# Patient Record
Sex: FEMALE | Race: BLACK OR AFRICAN AMERICAN | NOT HISPANIC OR LATINO | Employment: FULL TIME | URBAN - METROPOLITAN AREA
[De-identification: names, ages, dates, MRNs, and addresses within clinical notes are randomized per-mention and may not be internally consistent; named-entity substitution may affect disease eponyms.]

---

## 2018-01-10 NOTE — PROGRESS NOTES
Assessment    1  Constipation (564 00) (K59 00)   2  Screening, lipid (V77 91) (Z13 220)   3  Encounter for preventive health examination (V70 0) (Z00 00)    Plan   Health Maintenance    · Multiple Vitamin Oral Tablet; TAKE 1 TABLET DAILY   · 1 - Sindhu Gutierrez DO  (Obstetrics/Gynecology) Physician Referral  Consult  Status:  Hold For - Scheduling  Requested for: [id number]  Care Summary provided  : Yes  Laboratory examination ordered as part of a routine general medical examination    · (1) CBC/PLT/DIFF; Status:Active; Requested for:15Mar2016;    · (1) TSH; Status:Active; Requested for:15Mar2016;    · (1) VITAMIN D 25-HYDROXY; Status:Active; Requested for:15Mar2016;   Screening for diabetes mellitus    · (1) COMPREHENSIVE METABOLIC PANEL; Status:Active; Requested for:15Mar2016;   Screening, lipid    · (1) LIPID PANEL, FASTING; Status:Active; Requested for:15Mar2016;     * MAMMO SCREENING BILATERAL W CAD; Status:Hold For - Scheduling; Requested for:15Mar2016;   Perform:[facility]; SRZ:[id number];[facility];    [facility] for screening mammogram for breast cancer; Ordered By:Alexandria Silverio;      Discussion/Summary  health maintenance visit Currently, she eats an adequate diet  cervical cancer screening is current Breast cancer screening: mammogram has been ordered  Colorectal cancer screening: colorectal cancer screening is not indicated  Osteoporosis screening: bone mineral density testing is not indicated  The immunizations are needed and patient declines immunizations  Advice and education were given regarding nutrition and reproductive health  Patient discussion: discussed with the patient  1  s/p bronchitis  Reviewed ER records  Improving, complete azithromycin  Use benzonatate prn, may take ibuprofen prn for rib pain  2  Constipation  Increase fluid intake and fiber in diet, avoid straining  3  HM  Declined Tdap and flu vaccine  Mammogram and PAPs due this year   Due for routine labs     Follow up in 2 months or prn  History of Present Illness  HM, Adult Female: The patient is being seen for a health maintenance evaluation  General Health: The patient's health since the last visit is described as good  She does not have regular dental visits  She complains of vision problems  She denies hearing loss  Immunizations status: not up to date  Lifestyle:  She consumes a diverse and healthy diet  She does not have any weight concerns  She denies alcohol use  She denies drug use  Reproductive health:  she reports normal menses  Screening: Cervical cancer screening includes a pap smear performed last year  Breast cancer screening includes a mammogram performed last year  She hasn't been previously screened for colorectal cancer  Metabolic screening includes lipid profile performed within the past five years, glucose screening performed last year, thyroid function test performed last year and no previous DEXA  Cardiovascular risk factors: no obesity  Safety elements used: seat belt  HPI: Ms Kim Cedeno was recently diagnosed with pneumonia, was evaluated at the ER last week  She is almost done with her antibiotics, takes a cough medicine also  She feels her cough is better, still has some right lower rib pain when coughing  She does not take any over the counter medications  Denies any fever or chills  She has no other complaints  She moves her bowels eery 3-4 days, does not drink enough liquids daily  Constipation (Brief): The patient is being seen for an initial evaluation of an existing diagnosis of constipation  Symptoms:  infrequent stools, but no abdominal pain and no abdominal cramping  The patient is currently experiencing symptoms  No associated symptoms are reported  Pertinent medical history:  no irritable bowel syndrome  Risk factors:  inadequate fluid intake and inadequate dietary fiber, but no sedentary lifestyle        Review of Systems    Constitutional: no fever and not feeling tired  Eyes: eyesight problems, but no itching of the eyes  ENT: no hearing loss and no nasal discharge  Cardiovascular: no chest pain, no palpitations and no lower extremity edema  Respiratory: no cough and no wheezing  Gastrointestinal: constipation, but no abdominal pain  Genitourinary: no dysuria and no incontinence  Musculoskeletal: no arthralgias and no myalgias  Integumentary: no rashes and no skin wound  Neurological: no dizziness  Psychiatric: no sleep disturbances  no feelings of weakness   Hematologic/Lymphatic: no tendency for easy bleeding  Active Problems    1  Constipation (564 00) (K59 00)   2  Encounter for screening mammogram for breast cancer (V76 12) (Z12 31)   3  Laboratory examination ordered as part of a routine general medical examination   (V72 62) (Z00 00)   4  Screening for diabetes mellitus (V77 1) (Z13 1)   5  Screening, lipid (V77 91) (Z13 220)    Surgical History    · Denied: History of Recent Surgery    Family History    · No pertinent family history    · No pertinent family history    · Family history of malignant neoplasm of breast (V16 3) (Z80 3)    · Family history of diabetes mellitus (V18 0) (Z83 3)   · Family history of malignant neoplasm of breast (V16 3) (Z80 3)    · Family history of cardiac disorder (V17 49) (Z82 49)   · Family history of diabetes mellitus (V18 0) (Z83 3)    · Family history of cardiac disorder (V17 49) (Z82 49)    · Family history of cardiac disorder (V17 49) (Z82 49)    Social History    · Drinks coffee   · Never a smoker   · No alcohol use   · Single parent (V61 8)   · 22 y/o son   · Unemployed looking for work   · Work history   · marketing at [employer]    Current Meds   1  Multi Vitamin Daily Oral Tablet; TAKE 1 TABLET DAILY; Therapy: [id number] to Recorded    Allergies    1   No Known Drug Allergies    Vitals   Recorded: [id number] 11:44AM   Temperature 98 4 F   Heart Rate 81   Respiration 16   Systolic 215   Diastolic 74   Height 5 ft 1 in   Weight 115 lb 4 00 oz   BMI Calculated 21 78   BSA Calculated 1 49   O2 Saturation 96     Physical Exam    Constitutional   General appearance: No acute distress, well appearing and well nourished  appears healthy, comfortable, clothing appropriate and rested  Head and Face   Head and face: Normal     Eyes   Conjunctiva and lids: No swelling, erythema or discharge  Pupils and irises: Equal, round, reactive to light  Ears, Nose, Mouth, and Throat   External inspection of ears and nose: Normal     Otoscopic examination: Tympanic membranes translucent with normal light reflex  Canals patent without erythema  Nasal mucosa, septum, and turbinates: Normal without edema or erythema  nose ring  Oropharynx: Normal with no erythema, edema, exudate or lesions  Neck   Neck: Supple, symmetric, trachea midline, no masses  Thyroid: Normal, no thyromegaly  Pulmonary   Respiratory effort: No increased work of breathing or signs of respiratory distress  minimal pain below R breast, 5th-6th rib  Auscultation of lungs: Clear to auscultation  Cardiovascular   Auscultation of heart: Normal rate and rhythm, normal S1 and S2, no murmurs  Examination of extremities for edema and/or varicosities: Normal     Abdomen   Abdomen: Non-tender, no masses  Bowel sounds were normal  The abdomen was soft and nontender  no masses palpated  The abdomen was normal to percussion  Liver and spleen: No hepatomegaly or splenomegaly  Examination for hernias: No hernia appreciated  Lymphatic   Palpation of lymph nodes in neck: No lymphadenopathy  no posterior cervical node enlargement and no supraclavicular node enlargement  Palpation of lymph nodes in other areas: No lymphadenopathy  no anterior cervical node enlargement and no submandibular node enlargement     Musculoskeletal   Gait and station: Normal     Muscle strength/tone: Normal     Skin   Skin and subcutaneous tissue: Normal without rashes or lesions  Palpation of skin and subcutaneous tissue: Normal turgor  Neurologic   Cranial nerves: Cranial nerves II-XII intact  Cortical function: Normal mental status      Psychiatric   Orientation to person, place, and time: Normal     Mood and affect: Normal        Signatures   Electronically signed by : Erlin Mariano MD; Mar 15 2016  3:57PM EST                       (Author)

## 2018-01-12 NOTE — RESULT NOTES
Verified Results  *US BREAST RIGHT LIMITED (DIAGNOSTIC) [id number] 82:06ZW Sophie Gutierrez     Test Name Result Flag Reference   US BREAST RIGHT LIMITED (Report)     Patient History:   Family history of colorectal cancer in paternal uncle, breast    cancer in paternal grandmother at age 48 or over, and breast    cancer in maternal grandmother under age 48  Patient has never smoked  Patient's BMI is 22 1  Reason for exam: clinical finding  Mammo Diagnostic Bilateral W CAD: May 26, 2016 - Check In #:    [de-identified]   Bilateral MLO, CC, ML, and XCCL view(s) were taken  Technologist: PITER Dickson (PITER)(M)   Prior study comparison: September 18, 2015, mammo screening    bilateral, performed at [facility]  The breast tissue is heterogeneously dense, potentially limiting    the sensitivity of mammography  Patient risk, included in this    report, assists in determining the appropriate screening regimen    (such as 3-D mammography or the inclusion of automated breast    ultrasound or MRI)  3-D mammography may also remain indicated as    screening  These images were obtained using digital technique    and with the assistance of Computer Aided Detection  There are    no dominant masses, foci of architectural distortion or    suspicious clusters of calcification to suggest malignancy  The    visualized skin appears normal      Targeted ultrasound demonstrates no evidence of hypoechoic mass    or architectural distortion to suggest malignancy  What likely    represents an incidentally noted complicated cyst is present at    the 2 o'clock position of the left breast, 6 cm from the nipple    (area of palpable abnormality)       US Breast Right Limited: May 26, 2016 - Check In #: [de-identified]   Technologist: Marc Davis RDMS     US Breast Left Limited: May 26, 2016 - Check In #: [de-identified]   Technologist: Marc Davis RDMS     ASSESSMENT: BiRad:2 - Benign (Overall)     Recommendation:   Clinical management of both breasts  Routine screening mammogram of both breasts in 1 year  Analyzed by CAD     Transcription Location: [address]   Signing Station: [id number]     Risk Value(s):   Tyrer-Cuzick 10 Year: 2 420%, Tyrer-Cuzick Lifetime: 15 920%,    Myriad Table: 2 6%, SAGE 5 Year: 0 7%, NCI Lifetime: 9 5%     MAMMO DIAGNOSTIC BILATERAL W CAD [id number] 02:72VK Keisha Hernandez Order Number: [id number]     Test Name Result Flag Reference   MAMMO DIAGNOSTIC BILATERAL W CAD (Report)     Patient History:   Family history of colorectal cancer in paternal uncle, breast    cancer in paternal grandmother at age 48 or over, and breast    cancer in maternal grandmother under age 48  Patient has never smoked  Patient's BMI is 22 1  Reason for exam: clinical finding  Mammo Diagnostic Bilateral W CAD: May 26, 2016 - Check In #:    [de-identified]   Bilateral MLO, CC, ML, and XCCL view(s) were taken  Technologist: PITER Chowdary (PITER)(M)   Prior study comparison: September 18, 2015, mammo screening    bilateral, performed at [facility]  The breast tissue is heterogeneously dense, potentially limiting    the sensitivity of mammography  Patient risk, included in this    report, assists in determining the appropriate screening regimen    (such as 3-D mammography or the inclusion of automated breast    ultrasound or MRI)  3-D mammography may also remain indicated as    screening  These images were obtained using digital technique    and with the assistance of Computer Aided Detection  There are    no dominant masses, foci of architectural distortion or    suspicious clusters of calcification to suggest malignancy  The    visualized skin appears normal      Targeted ultrasound demonstrates no evidence of hypoechoic mass    or architectural distortion to suggest malignancy   What likely    represents an incidentally noted complicated cyst is present at    the 2 o'clock position of the left breast, 6 cm from the nipple    (area of palpable abnormality)  US Breast Right Limited: May 26, 2016 - Check In #: [de-identified]   Technologist: Pat Martinez RDMS     US Breast Left Limited: May 26, 2016 - Check In #: [de-identified]   Technologist: Pat Martinez RDMS     ASSESSMENT: BiRad:2 - Benign (Overall)     Recommendation:   Clinical management of both breasts  Routine screening mammogram of both breasts in 1 year     Analyzed by CAD     Transcription Location: [facility]: [id number]     Risk Value(s):   Tyrer-Cuzick 10 Year: 2 420%, Tyrer-Cuzick Lifetime: 15 920%,    Myriad Table: 2 6%, SAGE 5 Year: 0 7%, NCI Lifetime: 9 5%

## 2018-01-12 NOTE — RESULT NOTES
Verified Results  (1) CBC/PLT/DIFF [id number] 09:52AM [facility]     Test Name Result Flag Reference   WHITE BLOOD CELL COUNT 8 4 Thousand/uL  3 8-10 8   RED BLOOD CELL COUNT 4 03 Million/uL  3 80-5 10   HEMOGLOBIN 11 6 g/dL L 11 7-15 5   HEMATOCRIT 37 1 %  35 0-45 0   MCV 92 0 fL  80 0-100 0   MCH 28 9 pg  27 0-33 0   MCHC 31 4 g/dL L 32 0-36 0   RDW 13 6 %  11 0-15 0   PLATELET COUNT 864 Thousand/uL H 140-400   MPV 7 8 fL  7 5-11 5   ABSOLUTE NEUTROPHILS 5536 cells/uL  5820-9874   ABSOLUTE LYMPHOCYTES 1890 cells/uL  850-3900   ABSOLUTE MONOCYTES 689 cells/uL  200-950   ABSOLUTE EOSINOPHILS 244 cells/uL     ABSOLUTE BASOPHILS 42 cells/uL  0-200   NEUTROPHILS 65 9 %     LYMPHOCYTES 22 5 %     MONOCYTES 8 2 %     EOSINOPHILS 2 9 %     BASOPHILS 0 5 %       (1) LIPID PANEL, FASTING [id number] 09:52AM [facility]     Test Name Result Flag Reference   CHOLESTEROL, TOTAL 159 mg/dL  125-200   HDL CHOLESTEROL 59 mg/dL  > OR = 46   TRIGLICERIDES 72 mg/dL  <150   LDL-CHOLESTEROL 86 mg/dL (calc)  <130   Desirable range <100 mg/dL for patients with CHD or  diabetes and <70 mg/dL for diabetic patients with  known heart disease  CHOL/HDLC RATIO 2 7 (calc)  < OR = 5 0   NON HDL CHOLESTEROL 100 mg/dL (calc)     Target for non-HDL cholesterol is 30 mg/dL higher than   LDL cholesterol target  (1) COMPREHENSIVE METABOLIC PANEL [id number] 15:18YC [facility]     Test Name Result Flag Reference   GLUCOSE 79 mg/dL  65-99   Fasting reference interval   UREA NITROGEN (BUN) 9 mg/dL  7-25   CREATININE 0 73 mg/dL  0 50-1 10   eGFR NON-AFR   AMERICAN 102 mL/min/1 73m2  > OR = 60   eGFR AFRICAN AMERICAN 119 mL/min/1 73m2  > OR = 60   BUN/CREATININE RATIO   7-92   NOT APPLICABLE (calc)   SODIUM 137 mmol/L  135-146   POTASSIUM 4 4 mmol/L  3 5-5 3   CHLORIDE 103 mmol/L     CARBON DIOXIDE 26 mmol/L  19-30   CALCIUM 9 6 mg/dL  8 6-10 2   PROTEIN, TOTAL 7 2 g/dL  6 1-8 1   ALBUMIN 4 1 g/dL  3 6-5 1 GLOBULIN 3 1 g/dL (calc)  1 9-3 7   ALBUMIN/GLOBULIN RATIO 1 3 (calc)  1 0-2 5   BILIRUBIN, TOTAL 0 5 mg/dL  0 2-1 2   ALKALINE PHOSPHATASE 57 U/L     AST 14 U/L  10-30   ALT 8 U/L  6-29     (Q) TSH, 3RD GENERATION [id number] 09:52AM [facility]     Test Name Result Flag Reference   TSH 1 45 mIU/L     Reference Range                         > or = 20 Years  0 40-4 50                              Pregnancy Ranges            First trimester    0 26-2 66            Second trimester   0 55-2 73            Third trimester    0 43-2 91     *(Q) VITAMIN D, 25-HYDROXY, LC/MS/MS [id number] 09:52AM [facility] Silverio   REPORT COMMENT:  FASTING:YES     Test Name Result Flag Reference   VITAMIN D, 25-OH, TOTAL 29 ng/mL L    Vitamin D Status         25-OH Vitamin D:     Deficiency:                    <20 ng/mL  Insufficiency:             20 - 29 ng/mL  Optimal:                 > or = 30 ng/mL     For 25-OH Vitamin D testing on patients on   D2-supplementation and patients for whom quantitation   of D2 and D3 fractions is required, the QuestAssureD(TM)  25-OH VIT D, (D2,D3), LC/MS/MS is recommended: order   code 22036 (patients >2yrs)  For more information on this test, go to:  http://[employer]/faq/UST441  (This link is being provided for   informational/educational purposes only )

## 2018-01-15 NOTE — RESULT NOTES
Verified Results  (Q) THINPREP PAP AND HR HPV DNA [id number] 05:09IX Bisi Gutierrez     Test Name Result Flag Reference   CLINICAL INFORMATION:      none given   LMP:      NONE GIVEN   PREV  PAP:      NONE GIVEN   PREV  BX:      NONE GIVEN   SOURCE:      [facility]   STATEMENT OF ADEQUACY:      Satisfactory for evaluation  Endocervical/transformation zone component  present  Age and/or menstrual status not provided   INTERPRETATION/RESULT:      Negative for intraepithelial lesion or malignancy  CYTOTECHNOLOGIST:      MLC, CT(ASCP)  CT screening location:[place] [address]   HPV mRNA E6/E7 Not Detected  Not Detected   This test was performed using the APTIMA HPV Assay ([employer] )  This assay detects E6/E7 viral messenger RNA (mRNA) from 14  high-risk HPV types (16,18,31,33,35,39,45,51,52,56,58,59,66,68)

## 2019-01-30 ENCOUNTER — ANNUAL EXAM (OUTPATIENT)
Dept: OBGYN CLINIC | Facility: CLINIC | Age: 44
End: 2019-01-30
Payer: COMMERCIAL

## 2019-01-30 VITALS
DIASTOLIC BLOOD PRESSURE: 80 MMHG | SYSTOLIC BLOOD PRESSURE: 116 MMHG | BODY MASS INDEX: 21.71 KG/M2 | WEIGHT: 118 LBS | HEIGHT: 62 IN

## 2019-01-30 DIAGNOSIS — Z01.419 WELL FEMALE EXAM WITH ROUTINE GYNECOLOGICAL EXAM: Primary | ICD-10-CM

## 2019-01-30 DIAGNOSIS — Z11.51 SCREENING FOR HPV (HUMAN PAPILLOMAVIRUS): ICD-10-CM

## 2019-01-30 DIAGNOSIS — Z12.31 ENCOUNTER FOR SCREENING MAMMOGRAM FOR MALIGNANT NEOPLASM OF BREAST: ICD-10-CM

## 2019-01-30 DIAGNOSIS — Z12.4 ENCOUNTER FOR SCREENING FOR MALIGNANT NEOPLASM OF CERVIX: ICD-10-CM

## 2019-01-30 PROCEDURE — 99396 PREV VISIT EST AGE 40-64: CPT | Performed by: OBSTETRICS & GYNECOLOGY

## 2019-01-30 NOTE — PROGRESS NOTES
ASSESSMENT & PLAN: Brina Rice is a 39 y o  [id number] with normal gynecologic exam     1   Routine well woman exam done today  2   Pap and HPV:Pap with HPV was done today  Current ASCCP Guidelines reviewed  3   Mammogram ordered  Recommend yearly mammography  4   The patient declined STD testing  No testing performed  Safe sex practices have been discussed  5  The patient is sexually active  She declined contraception and options have been discussed  6  The following were reviewed in today's visit: breast self exam, mammography screening ordered, family planning choices, exercise and healthy diet  7  Patient to return to office in 12 months for annual exam      All questions have been answered to her satisfaction  CC:  Annual Gynecologic Examination    HPI: Brina Rice is a 39 y o  [id number] who presents for annual gynecologic examination  She has the following concerns: none  Health Maintenance:    She exercises 2 days per week  She wears her seatbelt routinely  She does perform regular monthly self breast exams  She feels safe at home  Patients does follow a balanced diet  Last mammo:     Past Medical History:   Diagnosis Date    Female infertility     Recurrent pregnancy loss, antepartum condition or complication     x 4       Past Surgical History:   Procedure Laterality Date     SECTION         Past OB/Gyn History:  Period Cycle (Days): 28  Period Duration (Days): 5 days   Period Pattern: Regular  Menstrual Flow: Moderate  Menstrual Control: Thin pad, Panty liner  Dysmenorrhea: (!) Mild  Dysmenorrhea Symptoms: CrampingPatient's last menstrual period was 2018 (exact date)  Menstrual History:  OB History      Para Term  AB Living    6 1 1   5 1    SAB TAB Ectopic Multiple Live Births    4       1           Patient's last menstrual period was 2018 (exact date)    Period Cycle (Days): 28  Period Duration (Days): 5 days   Period Pattern: Regular  Menstrual Flow: Moderate  Menstrual Control: Thin pad, Panty liner  Dysmenorrhea: (!) Mild  Dysmenorrhea Symptoms: Cramping    History of sexually transmitted infection No  Patient is currently sexually active: heterosexual  Birth control: no method  Last Pap  2016:  no abnormalities; HPV negative    Family History:  Family History   Problem Relation Age of Onset    No Known Problems Mother     No Known Problems Father     No Known Problems Sister     Diabetes Brother     Hypertension Brother     No Known Problems Son     Breast cancer Maternal Grandmother     No Known Problems Maternal Grandfather     Breast cancer Paternal Grandmother     Diabetes Paternal Grandfather     Heart disease Paternal Grandfather     Hypertension Paternal Grandfather     No Known Problems Sister     Heart attack Maternal Aunt        Social History:  Social History     Social History    Marital status: Single     Spouse name: N/A    Number of children: N/A    Years of education: N/A     Occupational History    Not on file  Social History Main Topics    Smoking status: Never Smoker    Smokeless tobacco: Never Used    Alcohol use No    Drug use: No    Sexual activity: Yes     Partners: Male     Birth control/ protection: None     Other Topics Concern    Not on file     Social History Narrative    No narrative on file     Presently lives with   Patient is   Patient is currently employed  Allergies:  No Known Allergies    Medications:  No current outpatient prescriptions on file  Review of Systems:  Review of Systems   Constitutional: Negative for unexpected weight change  Respiratory: Negative for shortness of breath  Cardiovascular: Negative for chest pain  Gastrointestinal: Negative for abdominal distention, abdominal pain, blood in stool, constipation, nausea and vomiting     Genitourinary: Negative for difficulty urinating, dysuria, frequency, vaginal bleeding, vaginal discharge and vaginal pain  Neurological: Negative for headaches  Physical Exam:  /80   Ht 5' 1 75" (1 568 m)   Wt 53 5 kg (118 lb)   LMP 12/17/2018 (Exact Date)   Breastfeeding? No   BMI 21 76 kg/m²      Physical Exam   Constitutional: She is oriented to person, place, and time  Vital signs are normal  She appears well-developed and well-nourished  Genitourinary: Vagina normal and uterus normal  Pelvic exam was performed with patient supine  There is no rash, tenderness or lesion on the right labia  There is no rash, tenderness or lesion on the left labia  Vagina exhibits rugosity  No tenderness or bleeding in the vagina  No vaginal discharge found  Right adnexum does not display mass, does not display tenderness and does not display fullness  Left adnexum does not display mass, does not display tenderness and does not display fullness  Cervix does not exhibit motion tenderness, lesion, discharge or polyp  Uterus is mobile and anteverted  Uterus is not enlarged  HENT:   Head: Normocephalic  Neck: No thyromegaly present  Cardiovascular: Normal rate, regular rhythm and normal heart sounds  Pulmonary/Chest: Effort normal and breath sounds normal  No respiratory distress  She has no wheezes  Right breast exhibits no inverted nipple, no mass, no nipple discharge, no skin change and no tenderness  Left breast exhibits no inverted nipple, no mass, no nipple discharge, no skin change and no tenderness  Abdominal: Soft  She exhibits no distension  Neurological: She is alert and oriented to person, place, and time  Psychiatric: She has a normal mood and affect  Her behavior is normal    Vitals reviewed

## 2019-02-03 LAB
CYTOLOGIST CVX/VAG CYTO: NORMAL
DX ICD CODE: NORMAL
HPV I/H RISK 1 DNA CVX QL PROBE+SIG AMP: NEGATIVE
OTHER STN SPEC: NORMAL
PATH REPORT.FINAL DX SPEC: NORMAL
SL AMB NOTE:: NORMAL
SL AMB SPECIMEN ADEQUACY: NORMAL
SL AMB TEST METHODOLOGY: NORMAL

## 2019-02-06 ENCOUNTER — TELEPHONE (OUTPATIENT)
Dept: RADIOLOGY | Facility: HOSPITAL | Age: 44
End: 2019-02-06

## 2019-02-06 ENCOUNTER — HOSPITAL ENCOUNTER (OUTPATIENT)
Dept: RADIOLOGY | Facility: HOSPITAL | Age: 44
Discharge: HOME | End: 2019-02-06
Attending: OBSTETRICS & GYNECOLOGY
Payer: COMMERCIAL

## 2019-02-06 VITALS — WEIGHT: 118 LBS | HEIGHT: 61 IN | BODY MASS INDEX: 22.28 KG/M2

## 2019-02-06 DIAGNOSIS — Z12.31 ENCOUNTER FOR SCREENING MAMMOGRAM FOR MALIGNANT NEOPLASM OF BREAST: ICD-10-CM

## 2019-02-06 PROCEDURE — 77063 BREAST TOMOSYNTHESIS BI: CPT

## 2019-02-06 PROCEDURE — 77067 SCR MAMMO BI INCL CAD: CPT

## 2019-02-20 ENCOUNTER — TELEPHONE (OUTPATIENT)
Dept: RADIOLOGY | Facility: HOSPITAL | Age: 44
End: 2019-02-20

## 2019-02-20 ENCOUNTER — HOSPITAL ENCOUNTER (OUTPATIENT)
Dept: RADIOLOGY | Facility: HOSPITAL | Age: 44
Discharge: HOME | End: 2019-02-20
Attending: OBSTETRICS & GYNECOLOGY
Payer: COMMERCIAL

## 2019-02-20 DIAGNOSIS — R92.8 ABNORMAL MAMMOGRAM: ICD-10-CM

## 2019-02-20 PROCEDURE — 77065 DX MAMMO INCL CAD UNI: CPT

## 2019-02-25 ENCOUNTER — TELEPHONE (OUTPATIENT)
Dept: OBGYN CLINIC | Facility: CLINIC | Age: 44
End: 2019-02-25

## 2019-02-25 ENCOUNTER — TRANSCRIBE ORDERS (OUTPATIENT)
Dept: ADMINISTRATIVE | Facility: HOSPITAL | Age: 44
End: 2019-02-25

## 2019-02-25 ENCOUNTER — HOSPITAL ENCOUNTER (OUTPATIENT)
Dept: RADIOLOGY | Facility: HOSPITAL | Age: 44
Discharge: HOME | End: 2019-02-25
Attending: OBSTETRICS & GYNECOLOGY

## 2019-02-25 ENCOUNTER — HOSPITAL ENCOUNTER (OUTPATIENT)
Dept: RADIOLOGY | Facility: HOSPITAL | Age: 44
Discharge: HOME | End: 2019-02-25
Attending: OBSTETRICS & GYNECOLOGY | Admitting: RADIOLOGY
Payer: COMMERCIAL

## 2019-02-25 VITALS
RESPIRATION RATE: 18 BRPM | OXYGEN SATURATION: 18 % | SYSTOLIC BLOOD PRESSURE: 114 MMHG | HEART RATE: 77 BPM | DIASTOLIC BLOOD PRESSURE: 68 MMHG

## 2019-02-25 DIAGNOSIS — R92.8 ABNORMAL MAMMOGRAM: Primary | ICD-10-CM

## 2019-02-25 DIAGNOSIS — R92.8 ABNORMAL MAMMOGRAM: ICD-10-CM

## 2019-02-25 PROCEDURE — 88305 TISSUE EXAM BY PATHOLOGIST: CPT | Performed by: PATHOLOGY

## 2019-02-25 PROCEDURE — 19081 BX BREAST 1ST LESION STRTCTC: CPT

## 2019-02-25 PROCEDURE — 88361 TUMOR IMMUNOHISTOCHEM/COMPUT: CPT | Performed by: PATHOLOGY

## 2019-02-25 RX ORDER — LIDOCAINE HYDROCHLORIDE 10 MG/ML
INJECTION, SOLUTION INFILTRATION; PERINEURAL CODE/TRAUMA/SEDATION MEDICATION
Status: COMPLETED | OUTPATIENT
Start: 2019-02-25 | End: 2019-02-25

## 2019-02-25 RX ORDER — LIDOCAINE HYDROCHLORIDE AND EPINEPHRINE 10; 10 MG/ML; UG/ML
INJECTION, SOLUTION INFILTRATION; PERINEURAL CODE/TRAUMA/SEDATION MEDICATION
Status: COMPLETED | OUTPATIENT
Start: 2019-02-25 | End: 2019-02-25

## 2019-02-25 RX ADMIN — LIDOCAINE HYDROCHLORIDE 5 ML: 10 INJECTION, SOLUTION INFILTRATION; PERINEURAL at 13:43

## 2019-02-25 RX ADMIN — LIDOCAINE HYDROCHLORIDE AND EPINEPHRINE 4 ML: 10; 10 INJECTION, SOLUTION INFILTRATION; PERINEURAL at 13:44

## 2019-02-25 NOTE — SEDATION DOCUMENTATION
Stereotactic right breast biopsy and clip marker placement done  Pt tolerated well  Pressure to sit,steristrips and DSD applied  No bleeding noted,area soft to touch  Discharge instructions reviewed with pt  And copy to her  Ice pack provided

## 2019-02-25 NOTE — TELEPHONE ENCOUNTER
Bridget Moreno 47  Calling to have provider sign order for stereotactic biopsy of right breast     Routing to provider to sign

## 2019-02-26 ENCOUNTER — TELEPHONE (OUTPATIENT)
Dept: RADIOLOGY | Facility: HOSPITAL | Age: 44
End: 2019-02-26

## 2019-02-26 NOTE — TELEPHONE ENCOUNTER
Post procedure call completed    Bleeding: _____yes __x___no    Pain: _____yes ___x___no    Redness/Swelling: ______yes __x____no    Band aid removed: _x____yes _____no    Steri-Strips intact: __x____yes _____no  Had pain last pm,relief with Tylenol  No pain today

## 2019-02-27 ENCOUNTER — TELEPHONE (OUTPATIENT)
Dept: RADIOLOGY | Facility: HOSPITAL | Age: 44
End: 2019-02-27

## 2019-02-27 NOTE — TELEPHONE ENCOUNTER
Pt  Called me after Dr Alysia Alicea called her with pathology results  She agreed to see Dr Vogel  I made an appt  For her on 3/7/19 at 1300  All questions answered  Directions and address given to her  Pt  Has my no  for any further assistance

## 2019-03-04 ENCOUNTER — CONSULT (OUTPATIENT)
Dept: SURGERY | Facility: CLINIC | Age: 44
End: 2019-03-04
Payer: COMMERCIAL

## 2019-03-04 VITALS
SYSTOLIC BLOOD PRESSURE: 118 MMHG | WEIGHT: 121.6 LBS | HEART RATE: 77 BPM | BODY MASS INDEX: 22.96 KG/M2 | DIASTOLIC BLOOD PRESSURE: 80 MMHG | HEIGHT: 61 IN

## 2019-03-04 DIAGNOSIS — D05.11 DUCTAL CARCINOMA IN SITU (DCIS) OF RIGHT BREAST: Primary | ICD-10-CM

## 2019-03-04 DIAGNOSIS — Z01.818 PREOPERATIVE EXAMINATION: ICD-10-CM

## 2019-03-04 PROCEDURE — 99244 OFF/OP CNSLTJ NEW/EST MOD 40: CPT | Performed by: SURGERY

## 2019-03-04 RX ORDER — CEFAZOLIN SODIUM 1 G/50ML
1000 SOLUTION INTRAVENOUS ONCE
Status: CANCELLED | OUTPATIENT
Start: 2019-03-04 | End: 2019-03-04

## 2019-03-04 NOTE — H&P
[facility] - [facility] Shannanoctavia Puckett    NAME: Barbara Calvillo  AGE: 39 y o  SEX: female  : 1975     DATE: 3/4/2019    Assessment and Plan     Problem List Items Addressed This Visit     None      Visit Diagnoses     Ductal carcinoma in situ (DCIS) of right breast    -  Primary        Discussed surgical treatment of needle placement lumpectomy, patient is agreeable to proceed  Risks and benefits discussed, consents signed in office today, she will schedule at her earliest convenience  Discussed follow up treatment to include radiation and evaluation by oncology for any additional therapy  Chief Complaint     Chief Complaint   Patient presents with    Advice Only     Breast cancer consult     History of Present Illness     Presents for evaluation of DCIS of right breast, found initially on routine screening annual mammogram as suspicious calcifications, biopsy result confirmed DCIS with calcifications covering an area of 1 2 x 0 5 cm  She did not notice any lump, skin changes, or pain in her breast  She had prior mammograms which were normal  Overall she is in good health with no medical problems, not on any chronic medications, no acute complaints  Does report family history of breast cancer in both grandmothers and notes her maternal grandmother  from breast cancer at age 39  Review of Systems     Review of Systems   Constitutional: Negative for appetite change, chills and fever  HENT: Negative for congestion, rhinorrhea and sore throat  Eyes: Negative for visual disturbance  Respiratory: Negative for cough, shortness of breath and wheezing  Cardiovascular: Negative for chest pain and leg swelling  Gastrointestinal: Negative for abdominal pain, constipation, diarrhea, nausea and vomiting  Skin: Negative for rash  Neurological: Negative for dizziness and headaches       Past Medical History     Past Medical History:   Diagnosis Date    Female infertility     Recurrent pregnancy loss, antepartum condition or complication     x 4     Past Surgical History     Past Surgical History:   Procedure Laterality Date     SECTION      MAMMO STEREOTACTIC BREAST BIOPSY RIGHT (ALL INC) Right 2019     Social History     Social History     Socioeconomic History    Marital status: Single     Spouse name: Not on file    Number of children: Not on file    Years of education: Not on file    Highest education level: Not on file   Occupational History    Not on file   Social Needs    Financial resource strain: Not on file    Food insecurity:     Worry: Not on file     Inability: Not on file    Transportation needs:     Medical: Not on file     Non-medical: Not on file   Tobacco Use    Smoking status: Never Smoker    Smokeless tobacco: Never Used   Substance and Sexual Activity    Alcohol use: No    Drug use: No    Sexual activity: Yes     Partners: Male     Birth control/protection: None   Lifestyle    Physical activity:     Days per week: Not on file     Minutes per session: Not on file    Stress: Not on file   Relationships    Social connections:     Talks on phone: Not on file     Gets together: Not on file     Attends Anabaptism service: Not on file     Active member of club or organization: Not on file     Attends meetings of clubs or organizations: Not on file     Relationship status: Not on file    Intimate partner violence:     Fear of current or ex partner: Not on file     Emotionally abused: Not on file     Physically abused: Not on file     Forced sexual activity: Not on file   Other Topics Concern    Not on file   Social History Narrative    Not on file     Family History     Family History   Problem Relation Age of Onset    No Known Problems Mother     No Known Problems Father     No Known Problems Sister     Diabetes Brother     Hypertension Brother     No Known Problems Son     Breast cancer Maternal Grandmother     No Known Problems Maternal Grandfather     Breast cancer Paternal Grandmother     Diabetes Paternal Grandfather     Heart disease Paternal Grandfather     Hypertension Paternal Grandfather     No Known Problems Sister     Heart attack Maternal Aunt      Current Medications     No current outpatient medications on file  Allergies     No Known Allergies    Objective     /80 (BP Location: Right arm, Patient Position: Sitting, Cuff Size: Adult)   Pulse 77   Ht 5' 1" (1 549 m)   Wt 55 2 kg (121 lb 9 6 oz)   LMP 02/15/2019   BMI 22 98 kg/m²       Physical Exam   Constitutional: She is oriented to person, place, and time  She appears well-developed and well-nourished  HENT:   Head: Normocephalic and atraumatic  Mouth/Throat: Oropharynx is clear and moist    Eyes: Conjunctivae and EOM are normal    Cardiovascular: Normal rate, regular rhythm and normal heart sounds  No murmur heard  Pulmonary/Chest: Effort normal and breath sounds normal  No respiratory distress  She has no wheezes  Breasts with no discrete masses or skin changes appreciated  Small bruise to right lateral breast at biopsy site  Abdominal: Soft  Bowel sounds are normal  She exhibits no distension  There is no tenderness  Musculoskeletal: Normal range of motion  She exhibits no edema or deformity  Neurological: She is alert and oriented to person, place, and time  Skin: Skin is warm and dry  Psychiatric: She has a normal mood and affect  Vitals reviewed  Aniyah Garner DO  [facility] Nancy Belle signed by Vic Amos MD at 3/4/2019  3:55 PM:  Saw the patient with the resident  I performed a thorough history, 14 point review of systems, physical exam   I agree with the assessment plan as outlined below  Patient has DCIS of the right breast on core biopsy  Patient needs needle placement lumpectomy    I described this procedure at length as well as the risks and complications  Patient desires to proceed  I also discussed radiation therapy to the breast to complete breast conservation therapy Plan  She understands and also desires to proceed

## 2019-03-04 NOTE — PROGRESS NOTES
[facility] - [facility] Ruth Ann Causey    NAME: Keila Scott  AGE: 39 y o  SEX: female  : 1975     DATE: 3/4/2019    Assessment and Plan     Problem List Items Addressed This Visit     None      Visit Diagnoses     Ductal carcinoma in situ (DCIS) of right breast    -  Primary        Discussed surgical treatment of needle placement lumpectomy, patient is agreeable to proceed  Risks and benefits discussed, consents signed in office today, she will schedule at her earliest convenience  Discussed follow up treatment to include radiation and evaluation by oncology for any additional therapy  Chief Complaint     Chief Complaint   Patient presents with    Advice Only     Breast cancer consult     History of Present Illness     Presents for evaluation of DCIS of right breast, found initially on routine screening annual mammogram as suspicious calcifications, biopsy result confirmed DCIS with calcifications covering an area of 1 2 x 0 5 cm  She did not notice any lump, skin changes, or pain in her breast  She had prior mammograms which were normal  Overall she is in good health with no medical problems, not on any chronic medications, no acute complaints  Does report family history of breast cancer in both grandmothers and notes her maternal grandmother  from breast cancer at age 39  Review of Systems     Review of Systems   Constitutional: Negative for appetite change, chills and fever  HENT: Negative for congestion, rhinorrhea and sore throat  Eyes: Negative for visual disturbance  Respiratory: Negative for cough, shortness of breath and wheezing  Cardiovascular: Negative for chest pain and leg swelling  Gastrointestinal: Negative for abdominal pain, constipation, diarrhea, nausea and vomiting  Skin: Negative for rash  Neurological: Negative for dizziness and headaches       Past Medical History     Past Medical History:   Diagnosis Date    Female infertility     Recurrent pregnancy loss, antepartum condition or complication     x 4     Past Surgical History     Past Surgical History:   Procedure Laterality Date     SECTION      MAMMO STEREOTACTIC BREAST BIOPSY RIGHT (ALL INC) Right 2019     Social History     Social History     Socioeconomic History    Marital status: Single     Spouse name: Not on file    Number of children: Not on file    Years of education: Not on file    Highest education level: Not on file   Occupational History    Not on file   Social Needs    Financial resource strain: Not on file    Food insecurity:     Worry: Not on file     Inability: Not on file    Transportation needs:     Medical: Not on file     Non-medical: Not on file   Tobacco Use    Smoking status: Never Smoker    Smokeless tobacco: Never Used   Substance and Sexual Activity    Alcohol use: No    Drug use: No    Sexual activity: Yes     Partners: Male     Birth control/protection: None   Lifestyle    Physical activity:     Days per week: Not on file     Minutes per session: Not on file    Stress: Not on file   Relationships    Social connections:     Talks on phone: Not on file     Gets together: Not on file     Attends Church service: Not on file     Active member of club or organization: Not on file     Attends meetings of clubs or organizations: Not on file     Relationship status: Not on file    Intimate partner violence:     Fear of current or ex partner: Not on file     Emotionally abused: Not on file     Physically abused: Not on file     Forced sexual activity: Not on file   Other Topics Concern    Not on file   Social History Narrative    Not on file     Family History     Family History   Problem Relation Age of Onset    No Known Problems Mother     No Known Problems Father     No Known Problems Sister     Diabetes Brother     Hypertension Brother     No Known Problems Son     Breast cancer Maternal Grandmother     No Known Problems Maternal Grandfather     Breast cancer Paternal Grandmother     Diabetes Paternal Grandfather     Heart disease Paternal Grandfather     Hypertension Paternal Grandfather     No Known Problems Sister     Heart attack Maternal Aunt      Current Medications     No current outpatient medications on file  Allergies     No Known Allergies    Objective     /80 (BP Location: Right arm, Patient Position: Sitting, Cuff Size: Adult)   Pulse 77   Ht 5' 1" (1 549 m)   Wt 55 2 kg (121 lb 9 6 oz)   LMP 02/15/2019   BMI 22 98 kg/m²      Physical Exam   Constitutional: She is oriented to person, place, and time  She appears well-developed and well-nourished  HENT:   Head: Normocephalic and atraumatic  Mouth/Throat: Oropharynx is clear and moist    Eyes: Conjunctivae and EOM are normal    Cardiovascular: Normal rate, regular rhythm and normal heart sounds  No murmur heard  Pulmonary/Chest: Effort normal and breath sounds normal  No respiratory distress  She has no wheezes  Breasts with no discrete masses or skin changes appreciated  Small bruise to right lateral breast at biopsy site  Abdominal: Soft  Bowel sounds are normal  She exhibits no distension  There is no tenderness  Musculoskeletal: Normal range of motion  She exhibits no edema or deformity  Neurological: She is alert and oriented to person, place, and time  Skin: Skin is warm and dry  Psychiatric: She has a normal mood and affect  Vitals reviewed            DO VANDA Garcia [facility] Fitz Carlin

## 2019-03-05 ENCOUNTER — TELEPHONE (OUTPATIENT)
Dept: OBGYN CLINIC | Facility: CLINIC | Age: 44
End: 2019-03-05

## 2019-03-05 ENCOUNTER — TELEPHONE (OUTPATIENT)
Dept: SURGERY | Facility: CLINIC | Age: 44
End: 2019-03-05

## 2019-03-05 PROBLEM — D05.11 DUCTAL CARCINOMA IN SITU (DCIS) OF RIGHT BREAST: Status: ACTIVE | Noted: 2019-03-05

## 2019-03-05 NOTE — TELEPHONE ENCOUNTER
Radiation is Monday through Friday for 4-6 weeks  There is no pill to replace radiation therapy  3-4 days off work should be sufficient    Thanks

## 2019-03-05 NOTE — TELEPHONE ENCOUNTER
Patient states so far she is doing okay  She met with surgeon yesterday (Dr Ely Smith) who reviewed the procedure with her  She will need to get radiation  She called back today to ask questions about radiation treatment  States she  has support at home  She would like to get a second opinion - asking provider for recommendations  Routing to provider for advise

## 2019-03-05 NOTE — TELEPHONE ENCOUNTER
----- Message from Indu Parker DO sent at 5/6/2143  2:05 PM EST -----  Dx with breast cancer  Has appt today with surgeon  Please call tomorrow to check in on patient

## 2019-03-05 NOTE — TELEPHONE ENCOUNTER
Patient called, she has a few questions regarding surgery and treatment plan    How many days a week is radiation and for how many weeks? ?    Is there a pill she can take for treatment instead of radiation? And, how long should she take off work after surgery? She is scheduled for surgery on 03/27/2019  She is a ?  She needs to notified HR dept ASAP

## 2019-03-13 ENCOUNTER — CONSULT (OUTPATIENT)
Dept: SURGICAL ONCOLOGY | Facility: CLINIC | Age: 44
End: 2019-03-13
Payer: COMMERCIAL

## 2019-03-13 VITALS
BODY MASS INDEX: 22.47 KG/M2 | HEIGHT: 61 IN | HEART RATE: 68 BPM | TEMPERATURE: 98 F | WEIGHT: 119 LBS | RESPIRATION RATE: 16 BRPM | DIASTOLIC BLOOD PRESSURE: 70 MMHG | SYSTOLIC BLOOD PRESSURE: 122 MMHG

## 2019-03-13 DIAGNOSIS — Z80.3 FAMILY HISTORY OF BREAST CANCER: ICD-10-CM

## 2019-03-13 DIAGNOSIS — D05.11 DUCTAL CARCINOMA IN SITU (DCIS) OF RIGHT BREAST: Primary | ICD-10-CM

## 2019-03-13 PROCEDURE — 99245 OFF/OP CONSLTJ NEW/EST HI 55: CPT | Performed by: SURGERY

## 2019-03-13 NOTE — LETTER
March 13, 9738     Dieudonne Bart, [address]  [facility] [address]    Patient: Maldonado Valdez   YOB: 1975   Date of Visit: 3/13/2019       Dear Dr Michael Barreto:    Thank you for referring Maldonado Valdez to me for evaluation  Below are my notes for this consultation  If you have questions, please do not hesitate to call me  I look forward to following your patient along with you  Sincerely,        Yi Malagon MD        CC: No Recipients  Yi Malagon MD  3/13/2019  3:08 PM  Sign at close encounter               Surgical Oncology Consult Note       [address]  1975  [id number]  [address] [facility]  [facility] [place] [phone]      Chief Complaint:     Chief Complaint   Patient presents with    Consult     Pt is here for initial consultation        Assessment and Plan:   Assessment/Plan   Patient presents for 2nd opinion with a new diagnosis of right breast ductal carcinoma in situ of the right breast   See HPI  I had a long conversation with her and her  regarding DCIS and its treatment  Given that she is under the age of 47 with a new diagnosis of breast cancer I have recommended genetic testing  She has second-degree relatives with breast cancer  She asked about different types of radiation therapy and the duration  We reviewed partial breast radiation therapy, hypo fractionated and standard radiation therapy  We also talked about possibly no radiation therapy as well as test that can provide more personal eyes risk regarding recurrence rates  The patient prefers genetic testing  We will coordinate this for her today  All questions were answered the patient's satisfaction      Oncology History:        Ductal carcinoma in situ (DCIS) of right breast 2019 Biopsy     Right breast stereotatic biopsy  11 o'clock, anterior 3rd of the breast  Ductal carcinoma in situ  Grade 2  ER 90  LA 20    Concordant            History of Present Illness: This is a 62-year-old woman who went for a screening mammogram which demonstrated an area of approximately 1 2 cm in size of microcalcifications  There are no abnormalities on the contralateral side  The patient subsequently had a diagnostic mammogram and ultimately a biopsy which demonstrated ductal carcinoma in situ at the 11 o'clock position in the right breast   The DCIS was grade 2, ER 90% LA 20%  Patient presents now for a 2nd opinion as outlined above  Review of Systems:   Review of Systems   Constitutional: Negative for activity change, appetite change and fatigue  HENT: Negative  Eyes: Negative  Respiratory: Negative for cough, shortness of breath and wheezing  Cardiovascular: Negative for chest pain and leg swelling  Gastrointestinal: Negative  Endocrine: Negative  Genitourinary: Negative  Musculoskeletal:        No new changes or complaints of bone pain   Skin: Negative  Allergic/Immunologic: Negative  Neurological: Negative  Hematological: Negative  Psychiatric/Behavioral: Negative          Past Medical History:      Patient Active Problem List   Diagnosis    Breast lump on right side at 7 o'clock position    Ductal carcinoma in situ (DCIS) of right breast        Past Medical History:   Diagnosis Date    Female infertility     Recurrent pregnancy loss, antepartum condition or complication     x 4        Past Surgical History:   Procedure Laterality Date     SECTION      MAMMO STEREOTACTIC BREAST BIOPSY RIGHT (ALL INC) Right 2019        Family History   Problem Relation Age of Onset    No Known Problems Mother     No Known Problems Father     No Known Problems Sister     Diabetes Brother     Hypertension Brother     No Known Problems Son     Breast cancer Maternal Grandmother     No Known Problems Maternal Grandfather     Breast cancer Paternal Grandmother     Diabetes Paternal Grandfather     Heart disease Paternal Grandfather     Hypertension Paternal Grandfather     No Known Problems Sister     Heart attack Maternal Aunt         Social History     Socioeconomic History    Marital status: Single     Spouse name: Not on file    Number of children: Not on file    Years of education: Not on file    Highest education level: Not on file   Occupational History    Not on file   Social Needs    Financial resource strain: Not on file    Food insecurity:     Worry: Not on file     Inability: Not on file    Transportation needs:     Medical: Not on file     Non-medical: Not on file   Tobacco Use    Smoking status: Never Smoker    Smokeless tobacco: Never Used   Substance and Sexual Activity    Alcohol use: No    Drug use: No    Sexual activity: Yes     Partners: Male     Birth control/protection: None   Lifestyle    Physical activity:     Days per week: Not on file     Minutes per session: Not on file    Stress: Not on file   Relationships    Social connections:     Talks on phone: Not on file     Gets together: Not on file     Attends Jainism service: Not on file     Active member of club or organization: Not on file     Attends meetings of clubs or organizations: Not on file     Relationship status: Not on file    Intimate partner violence:     Fear of current or ex partner: Not on file     Emotionally abused: Not on file     Physically abused: Not on file     Forced sexual activity: Not on file   Other Topics Concern    Not on file   Social History Narrative    Not on file      No current outpatient medications on file       Allergies   Allergen Reactions    Shellfish-Derived Products Swelling     shrimp       Physical Exam:     Vitals:    03/13/19 1359   BP: 122/70   Pulse: 68   Resp: 16   Temp: 98 °F (36 7 °C)     Physical Exam Constitutional: She is oriented to person, place, and time  She appears well-developed and well-nourished  HENT:   Head: Normocephalic and atraumatic  Mouth/Throat: Oropharynx is clear and moist    Eyes: Pupils are equal, round, and reactive to light  EOM are normal    Neck: Normal range of motion  Neck supple  No JVD present  No tracheal deviation present  No thyromegaly present  Cardiovascular: Normal rate, regular rhythm, normal heart sounds and intact distal pulses  Exam reveals no gallop and no friction rub  No murmur heard  Pulmonary/Chest: Effort normal and breath sounds normal  No respiratory distress  She has no wheezes  She has no rales  Examination of the left breast in both the sitting and supine position demonstrate no skin changes nipple discharge dominant masses or axillary adenopathy  Examination of the right breast demonstrates a small amount of ecchymosis well as tenderness/mass consistent with a small hematoma is outlined on the diagram   She has well-healed lateral biopsy incision  There is no evidence of any axillary adenopathy or other dominant masses or worrisome skin findings  Abdominal: Soft  She exhibits no distension and no mass  There is no hepatomegaly  There is no tenderness  There is no rebound and no guarding  Musculoskeletal: Normal range of motion  She exhibits no edema or tenderness  Lymphadenopathy:     She has no cervical adenopathy  Neurological: She is alert and oriented to person, place, and time  No cranial nerve deficit  Skin: Skin is warm and dry  No rash noted  No erythema  Psychiatric: She has a normal mood and affect  Her behavior is normal    Vitals reviewed  Results:    I reviewed her imaging as well as her pathology  The radiologist was issue to concordance report I concur with this          Discussion/Summary:   The patient I review ductal carcinoma in situ I explained that there is a small approximately 5% chance that she has invasive cancer that would be identified on her final pathology in which case the sentinel lymph node would be warranted  We talked about anti hormonal therapy as a possibility I have recommended medical oncology consult postoperatively  The patient wishes to have a baby in the next year and she would need to be off anti hormonal therapy for this  We talked about radiation therapy as well as several different ways to receive it  The patient prefers to be genetically tested will try to coordinate this for her today  She will contact us if we can provide her any additional information  Advance Care Planning/Advance Directives:  I discussed the disease status, treatment plans and follow-up with the patient

## 2019-03-13 NOTE — PROGRESS NOTES
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   See HPI  I had a long conversation with her and her  regarding DCIS and its treatment  Given that she is under the age of 47 with a new diagnosis of breast cancer I have recommended genetic testing  She has second-degree relatives with breast cancer  She asked about different types of radiation therapy and the duration  We reviewed partial breast radiation therapy, hypo fractionated and standard radiation therapy  We also talked about possibly no radiation therapy as well as test that can provide more personal eyes risk regarding recurrence rates  The patient prefers genetic testing  We will coordinate this for her today  All questions were answered the patient's satisfaction  Oncology History:        Ductal carcinoma in situ (DCIS) of right breast    2/25/2019 Biopsy     Right breast stereotatic biopsy  11 o'clock, anterior 3rd of the breast  Ductal carcinoma in situ  Grade 2  ER 90  IL 20    Concordant            History of Present Illness: This is a 40-year-old woman who went for a screening mammogram which demonstrated an area of approximately 1 2 cm in size of microcalcifications  There are no abnormalities on the contralateral side    The patient subsequently had a diagnostic mammogram and ultimately a biopsy which demonstrated ductal carcinoma in situ at the 11 o'clock position in the right breast   The DCIS was grade 2, ER 90% RI 20%  Patient presents now for a 2nd opinion as outlined above  Review of Systems:   Review of Systems   Constitutional: Negative for activity change, appetite change and fatigue  HENT: Negative  Eyes: Negative  Respiratory: Negative for cough, shortness of breath and wheezing  Cardiovascular: Negative for chest pain and leg swelling  Gastrointestinal: Negative  Endocrine: Negative  Genitourinary: Negative  Musculoskeletal:        No new changes or complaints of bone pain   Skin: Negative  Allergic/Immunologic: Negative  Neurological: Negative  Hematological: Negative  Psychiatric/Behavioral: Negative          Past Medical History:      Patient Active Problem List   Diagnosis    Breast lump on right side at 7 o'clock position    Ductal carcinoma in situ (DCIS) of right breast        Past Medical History:   Diagnosis Date    Female infertility     Recurrent pregnancy loss, antepartum condition or complication     x 4        Past Surgical History:   Procedure Laterality Date     SECTION      MAMMO STEREOTACTIC BREAST BIOPSY RIGHT (ALL INC) Right 2019        Family History   Problem Relation Age of Onset    No Known Problems Mother     No Known Problems Father     No Known Problems Sister     Diabetes Brother     Hypertension Brother     No Known Problems Son     Breast cancer Maternal Grandmother     No Known Problems Maternal Grandfather     Breast cancer Paternal Grandmother     Diabetes Paternal Grandfather     Heart disease Paternal Grandfather     Hypertension Paternal Grandfather     No Known Problems Sister     Heart attack Maternal Aunt         Social History     Socioeconomic History    Marital status: Single     Spouse name: Not on file    Number of children: Not on file    Years of education: Not on file    Highest education level: Not on file   Occupational History    Not on file   Social Needs    Financial resource strain: Not on file    Food insecurity:     Worry: Not on file     Inability: Not on file    Transportation needs:     Medical: Not on file     Non-medical: Not on file   Tobacco Use    Smoking status: Never Smoker    Smokeless tobacco: Never Used   Substance and Sexual Activity    Alcohol use: No    Drug use: No    Sexual activity: Yes     Partners: Male     Birth control/protection: None   Lifestyle    Physical activity:     Days per week: Not on file     Minutes per session: Not on file    Stress: Not on file   Relationships    Social connections:     Talks on phone: Not on file     Gets together: Not on file     Attends Denominational service: Not on file     Active member of club or organization: Not on file     Attends meetings of clubs or organizations: Not on file     Relationship status: Not on file    Intimate partner violence:     Fear of current or ex partner: Not on file     Emotionally abused: Not on file     Physically abused: Not on file     Forced sexual activity: Not on file   Other Topics Concern    Not on file   Social History Narrative    Not on file      No current outpatient medications on file  Allergies   Allergen Reactions    Shellfish-Derived Products Swelling     shrimp       Physical Exam:     Vitals:    03/13/19 1359   BP: 122/70   Pulse: 68   Resp: 16   Temp: 98 °F (36 7 °C)     Physical Exam   Constitutional: She is oriented to person, place, and time  She appears well-developed and well-nourished  HENT:   Head: Normocephalic and atraumatic  Mouth/Throat: Oropharynx is clear and moist    Eyes: Pupils are equal, round, and reactive to light  EOM are normal    Neck: Normal range of motion  Neck supple  No JVD present  No tracheal deviation present  No thyromegaly present  Cardiovascular: Normal rate, regular rhythm, normal heart sounds and intact distal pulses  Exam reveals no gallop and no friction rub     No murmur heard   Pulmonary/Chest: Effort normal and breath sounds normal  No respiratory distress  She has no wheezes  She has no rales  Examination of the left breast in both the sitting and supine position demonstrate no skin changes nipple discharge dominant masses or axillary adenopathy  Examination of the right breast demonstrates a small amount of ecchymosis well as tenderness/mass consistent with a small hematoma is outlined on the diagram   She has well-healed lateral biopsy incision  There is no evidence of any axillary adenopathy or other dominant masses or worrisome skin findings  Abdominal: Soft  She exhibits no distension and no mass  There is no hepatomegaly  There is no tenderness  There is no rebound and no guarding  Musculoskeletal: Normal range of motion  She exhibits no edema or tenderness  Lymphadenopathy:     She has no cervical adenopathy  Neurological: She is alert and oriented to person, place, and time  No cranial nerve deficit  Skin: Skin is warm and dry  No rash noted  No erythema  Psychiatric: She has a normal mood and affect  Her behavior is normal    Vitals reviewed  Results:    I reviewed her imaging as well as her pathology  The radiologist was issue to concordance report I concur with this  Discussion/Summary:   The patient I review ductal carcinoma in situ I explained that there is a small approximately 5% chance that she has invasive cancer that would be identified on her final pathology in which case the sentinel lymph node would be warranted  We talked about anti hormonal therapy as a possibility I have recommended medical oncology consult postoperatively  The patient wishes to have a baby in the next year and she would need to be off anti hormonal therapy for this  We talked about radiation therapy as well as several different ways to receive it  The patient prefers to be genetically tested will try to coordinate this for her today    She will contact us if we can provide her any additional information  Advance Care Planning/Advance Directives:  I discussed the disease status, treatment plans and follow-up with the patient

## 2019-03-14 ENCOUNTER — OFFICE VISIT (OUTPATIENT)
Dept: SURGICAL ONCOLOGY | Facility: CLINIC | Age: 44
End: 2019-03-14
Payer: COMMERCIAL

## 2019-03-14 ENCOUNTER — TRANSCRIBE ORDERS (OUTPATIENT)
Dept: LAB | Facility: CLINIC | Age: 44
End: 2019-03-14

## 2019-03-14 ENCOUNTER — APPOINTMENT (OUTPATIENT)
Dept: LAB | Facility: CLINIC | Age: 44
End: 2019-03-14
Payer: COMMERCIAL

## 2019-03-14 DIAGNOSIS — D05.11 INTRADUCTAL CARCINOMA IN SITU OF RIGHT BREAST: Primary | ICD-10-CM

## 2019-03-14 DIAGNOSIS — Z13.79 GENETIC TESTING: Primary | ICD-10-CM

## 2019-03-14 DIAGNOSIS — D05.11 DUCTAL CARCINOMA IN SITU (DCIS) OF RIGHT BREAST: ICD-10-CM

## 2019-03-14 PROCEDURE — 99213 OFFICE O/P EST LOW 20 MIN: CPT | Performed by: NURSE PRACTITIONER

## 2019-03-14 PROCEDURE — 36415 COLL VENOUS BLD VENIPUNCTURE: CPT | Performed by: NURSE PRACTITIONER

## 2019-03-14 NOTE — PROGRESS NOTES
Surgical Oncology Follow Up       [address] [place] [address]  [facility] SURGICAL ONCOLOGY [address] [address]    Brina Rice  1975  [id number]  [address] [place] [address]  [facility] SURGICAL ONCOLOGY [place] [id number]    Chief Complaint   Patient presents with    Genetic Evaluation     Pt is here for gentetic testing        Assessment/Plan:  1  Genetic testing  - Testing ordered an obtained today ([facility] STAT Breast panel); Will call patient with results when they are available    2  Ductal carcinoma in situ (DCIS) of right breast  - f/u with Dr Camryn Ibrahim in 2 weeks, pre op     Discussion/Summary: Patient presents today for genetic testing after a recent diagnosis of right breast cancer  While in the office today, the patient was informed about benefits and implications of genetic testing such as potential discrimination against life or disability insurance  She has also viewed the patient video on [employer]'s website while in the office today  The patient understands that there are three possible outcomes of genetic testing: no pathogenic mutation, a positive pathogenic mutation and variant of uncertain significance  We discussed that if there is a mutation identified, this will guide us to make medical recommendations, such as prophylactic surgery or increased screening regimens  We also discussed that there is a 50% chance of a first degree relative to have the same mutation  She states she understands this and the pros and cons of testing and wishes to proceed  She was escorted to the lab to obtain the blood sample and I have ordered the [facility] STAT panel  Our office will contact her with the results when they become available to us  At this time, she would like to proceed with surgery with Dr Camryn Ibrahim  We will coordinate a pre op appt after her genetic testing results are available  All of her questions were answered       I spent greater than 50% of today's appointment coordinating care and providing face to face counseling about genetic testing  History of Present Illness:        Ductal carcinoma in situ (DCIS) of right breast    2019 Biopsy     Right breast stereotatic biopsy  11 o'clock, anterior 3rd of the breast  Ductal carcinoma in situ  Grade 2  ER 90  SC 20    Concordant             -Interval History: Patient presents today for genetic testing after a recent breast cancer diagnosis  She has a family history of breast cancer in her paternal and maternal grandmothers, both diagnosed at age 39  No other family history of cancer      Review of Systems:  Review of Systems   Unable to perform ROS: Other       Patient Active Problem List   Diagnosis    Breast lump on right side at 7 o'clock position    Ductal carcinoma in situ (DCIS) of right breast    Genetic testing     Past Medical History:   Diagnosis Date    Female infertility     Recurrent pregnancy loss, antepartum condition or complication     x 4     Past Surgical History:   Procedure Laterality Date     SECTION      MAMMO STEREOTACTIC BREAST BIOPSY RIGHT (ALL INC) Right 2019     Family History   Problem Relation Age of Onset    No Known Problems Mother     No Known Problems Father     No Known Problems Sister     Diabetes Brother     Hypertension Brother     No Known Problems Son     Breast cancer Maternal Grandmother 39    No Known Problems Maternal Grandfather     Breast cancer Paternal Grandmother 39    Diabetes Paternal Grandfather     Heart disease Paternal Grandfather     Hypertension Paternal Grandfather     No Known Problems Sister     Heart attack Maternal Aunt      Social History     Socioeconomic History    Marital status: Single     Spouse name: Not on file    Number of children: Not on file    Years of education: Not on file    Highest education level: Not on file   Occupational History    Not on file   Social Needs  Financial resource strain: Not on file   Brian-Izzy insecurity:     Worry: Not on file     Inability: Not on file    Transportation needs:     Medical: Not on file     Non-medical: Not on file   Tobacco Use    Smoking status: Never Smoker    Smokeless tobacco: Never Used   Substance and Sexual Activity    Alcohol use: No    Drug use: No    Sexual activity: Yes     Partners: Male     Birth control/protection: None   Lifestyle    Physical activity:     Days per week: Not on file     Minutes per session: Not on file    Stress: Not on file   Relationships    Social connections:     Talks on phone: Not on file     Gets together: Not on file     Attends Jainism service: Not on file     Active member of club or organization: Not on file     Attends meetings of clubs or organizations: Not on file     Relationship status: Not on file    Intimate partner violence:     Fear of current or ex partner: Not on file     Emotionally abused: Not on file     Physically abused: Not on file     Forced sexual activity: Not on file   Other Topics Concern    Not on file   Social History Narrative    Not on file     No current outpatient medications on file  Allergies   Allergen Reactions    Shellfish-Derived Products Swelling     shrimp     There were no vitals filed for this visit  Physical Exam   Constitutional: She is oriented to person, place, and time  She appears well-developed and well-nourished  Pulmonary/Chest: Effort normal    Neurological: She is alert and oriented to person, place, and time  Psychiatric: She has a normal mood and affect  Advance Care Planning/Advance Directives:  Discussed disease status, cancer treatment plans and/or cancer treatment goals with the patient

## 2019-03-19 LAB — MISCELLANEOUS LAB TEST RESULT: NORMAL

## 2019-03-22 ENCOUNTER — TELEPHONE (OUTPATIENT)
Dept: SURGICAL ONCOLOGY | Facility: CLINIC | Age: 44
End: 2019-03-22

## 2019-03-22 PROBLEM — Z15.01 MONOALLELIC MUTATION OF ATM GENE: Status: ACTIVE | Noted: 2019-03-14

## 2019-03-22 PROBLEM — Z15.09 MONOALLELIC MUTATION OF ATM GENE: Status: ACTIVE | Noted: 2019-03-14

## 2019-03-22 PROBLEM — Z15.89 MONOALLELIC MUTATION OF ATM GENE: Status: ACTIVE | Noted: 2019-03-14

## 2019-03-22 NOTE — TELEPHONE ENCOUNTER
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  I have provided her the phone number for [employer]'s certified genetic counselor and have encouraged the patient to call for further discussion about her results  I did discuss that at this time, per NCCN guidelines, there is insufficient evidence to recommend risk reducing mastectomies with an JS genetic mutation, however, her surgical options and recommendations will be discussed further with Dr Cat Galeas at her pre op appt on Monday  She does state that she prefers to avoid a mastectomy if possible  I will release her results to her through the [employer] portal and she will be given a copy of her results at her appt on Monday  All of her questions were answered at this time

## 2019-03-25 ENCOUNTER — HOSPITAL ENCOUNTER (OUTPATIENT)
Dept: RADIOLOGY | Facility: HOSPITAL | Age: 44
Discharge: HOME | End: 2019-03-25
Attending: SURGERY
Payer: COMMERCIAL

## 2019-03-25 ENCOUNTER — OFFICE VISIT (OUTPATIENT)
Dept: LAB | Facility: CLINIC | Age: 44
End: 2019-03-25
Payer: COMMERCIAL

## 2019-03-25 ENCOUNTER — OFFICE VISIT (OUTPATIENT)
Dept: SURGICAL ONCOLOGY | Facility: CLINIC | Age: 44
End: 2019-03-25
Payer: COMMERCIAL

## 2019-03-25 ENCOUNTER — TRANSCRIBE ORDERS (OUTPATIENT)
Dept: LAB | Facility: CLINIC | Age: 44
End: 2019-03-25

## 2019-03-25 VITALS
DIASTOLIC BLOOD PRESSURE: 80 MMHG | TEMPERATURE: 98.4 F | HEART RATE: 77 BPM | SYSTOLIC BLOOD PRESSURE: 124 MMHG | WEIGHT: 119 LBS | RESPIRATION RATE: 16 BRPM | HEIGHT: 61 IN | BODY MASS INDEX: 22.47 KG/M2

## 2019-03-25 DIAGNOSIS — D05.11 DUCTAL CARCINOMA IN SITU (DCIS) OF RIGHT BREAST: ICD-10-CM

## 2019-03-25 DIAGNOSIS — Z15.89 MONOALLELIC MUTATION OF ATM GENE: ICD-10-CM

## 2019-03-25 DIAGNOSIS — D05.11 DUCTAL CARCINOMA IN SITU (DCIS) OF RIGHT BREAST: Primary | ICD-10-CM

## 2019-03-25 DIAGNOSIS — Z15.01 MONOALLELIC MUTATION OF ATM GENE: ICD-10-CM

## 2019-03-25 DIAGNOSIS — Z15.09 MONOALLELIC MUTATION OF ATM GENE: ICD-10-CM

## 2019-03-25 LAB
ATRIAL RATE: 63 BPM
P AXIS: 60 DEGREES
PR INTERVAL: 192 MS
QRS AXIS: 39 DEGREES
QRSD INTERVAL: 70 MS
QT INTERVAL: 400 MS
QTC INTERVAL: 409 MS
T WAVE AXIS: 33 DEGREES
VENTRICULAR RATE: 63 BPM

## 2019-03-25 PROCEDURE — 71046 X-RAY EXAM CHEST 2 VIEWS: CPT

## 2019-03-25 PROCEDURE — 93005 ELECTROCARDIOGRAM TRACING: CPT

## 2019-03-25 PROCEDURE — 93010 ELECTROCARDIOGRAM REPORT: CPT | Performed by: INTERNAL MEDICINE

## 2019-03-25 PROCEDURE — 99214 OFFICE O/P EST MOD 30 MIN: CPT | Performed by: SURGERY

## 2019-03-25 RX ORDER — OXYCODONE HYDROCHLORIDE AND ACETAMINOPHEN 5; 325 MG/1; MG/1
1 TABLET ORAL EVERY 6 HOURS PRN
Qty: 6 TABLET | Refills: 0 | Status: SHIPPED | OUTPATIENT
Start: 2019-03-25 | End: 2019-04-15

## 2019-03-25 NOTE — PATIENT INSTRUCTIONS
Breast Lumpectomy   AMBULATORY CARE:   What you need to know about a lumpectomy:  A lumpectomy is surgery to remove a mass in your breast  Breast tissue that surrounds the mass may also be taken  A lumpectomy is also known as breast-conserving surgery, a partial mastectomy, or a segmental mastectomy  How to prepare for a lumpectomy:   · You may need a mammogram or ultrasound before surgery  These tests may be done the same day as your surgery or at an earlier time  Your healthcare provider may use pictures from these tests to kory the location of the mass  The marker will show him where to make your incision  · Your healthcare provider will talk to you about how to prepare for surgery  He may tell you not to eat or drink anything after midnight on the day of your surgery  He will tell you what medicines to take or not take on the day of your surgery  You may need to stop taking blood thinners or aspirin several days before your surgery  Arrange for someone to drive you home and stay with you for 24 hours after surgery  This person can help care for you, and monitor for any problems  What will happen during a lumpectomy:  You will be given general anesthesia to keep you asleep and free from pain during surgery  You may be given an antibiotic through your IV to help prevent a bacterial infection  Your healthcare provider will make an incision in your breast and remove the mass  He may also remove breast tissue or lymph nodes that are close to the mass  A drain may be inserted near your incision to remove extra fluid  This will decrease swelling and help your incision heal  Your healthcare provider will close your incision with stitches or strips of medical tape and cover it with a bandage  He may also wrap a tight-fitting bandage around both of your breasts  This may decrease swelling, bleeding, and pain  What will happen after a lumpectomy:  Healthcare providers will monitor you until you are awake   You may able to go home when you are awake and your pain is controlled  Instead you may need to spend the night in the hospital    Risks of a lumpectomy:  You may bleed more than expected or get an infection  Nerves, blood vessels, and muscles may be damaged during your surgery  You may have swelling in your arm closest to the lumpectomy or where lymph nodes were removed  This swelling is called lymphedema  Lymphedema may cause tingling, numbness, stiffness, and weakness in your arm  This may be permanent  You may get a blood clot in your arm or leg  The blood clot may travel to your heart lungs, or brain  This may become life-threatening  Call 911 for any of the following:   · You feel lightheaded, short of breath, and have chest pain  · You cough up blood  · You have trouble breathing  Seek care immediately if:   · Blood soaks through your bandage  · Your stitches come apart  · Your bruise suddenly gets bigger  · Your leg or arm is larger than normal and painful  Contact your healthcare provider if:   · You have a fever or chills  · Your wound is red, swollen, or draining pus  · You have nausea or are vomiting  · Your skin is itchy, swollen, or you have a rash  · Your pain does not get better after you take pain medicine  · Your drain falls out or stops draining fluid  · Your drain has pus or foul-smelling fluid coming out of it  · You have questions or concerns about your condition or care  Medicines: You may need any of the following:  · Antibiotics  help prevent a bacterial infection  · Prescription pain medicine  may be given  Ask your healthcare provider how to take this medicine safely  Some prescription pain medicines contain acetaminophen  Do not take other medicines that contain acetaminophen without talking to your healthcare provider  Too much acetaminophen may cause liver damage  Prescription pain medicine may cause constipation   Ask your healthcare provider how to prevent or treat constipation  · NSAIDs , such as ibuprofen, help decrease swelling, pain, and fever  NSAIDs can cause stomach bleeding or kidney problems in certain people  If you take blood thinner medicine, always ask your healthcare provider if NSAIDs are safe for you  Always read the medicine label and follow directions  · Take your medicine as directed  Contact your healthcare provider if you think your medicine is not helping or if you have side effects  Tell him or her if you are allergic to any medicine  Keep a list of the medicines, vitamins, and herbs you take  Include the amounts, and when and why you take them  Bring the list or the pill bottles to follow-up visits  Carry your medicine list with you in case of an emergency  Care for your wound as directed: If you have a tight-fitting bandage, you can remove it in 24 to 48 hours, or as directed  Ask your healthcare provider when your incision can get wet  You may need to take a sponge bath until your drain is removed  Carefully wash around the incision with soap and water  It is okay to allow the soap and water to gently run over your incision  Gently pat dry the area and put on new, clean bandages as directed  Change your bandages when they get wet or dirty  Check your incision every day for redness, pus, or swelling  Self-care:   · Apply ice  on your breast for 15 to 20 minutes every hour or as directed  Use an ice pack, or put crushed ice in a plastic bag  Cover it with a towel  Ice helps prevent tissue damage and decreases swelling and pain  · Rest  as directed  Do not lift anything heavy  Do not push or pull with your arms  Take short walks around the house  Gradually walk further as you feel better  Ask your healthcare provider when you can return to your normal activities  · Empty your drain  as directed  You may need to write down how much you empty from your drain each day   Ask your healthcare provider for more information about how to empty your drain  · Wear a supportive bra  as directed  Wait until you remove the tight-fitting bandage to wear a bra  You may be given a surgical bra or told to wear a sports bra  A supportive bra may help hold your bandages in place  It may also help with swelling and pain  Do not  wear bras with lace or underwire  They may rub against your incision and cause discomfort  Arm stretches: Your healthcare provider may show you how to do arm stretches  Arm stretches may prevent stiff arms or shoulders  You may need to wait until after your drains are removed to begin stretching  Do not do arm stretches until your healthcare provider says it is okay  Ask your healthcare provider for more information about arm stretches  Follow up with your healthcare provider as directed:  Write down your questions so you remember to ask them during your visits  © 2017 [facility] Information is for End User's use only and may not be sold, redistributed or otherwise used for commercial purposes  All illustrations and images included in CareNotes® are the copyrighted property of [employer]  or [employer]  The above information is an  only  It is not intended as medical advice for individual conditions or treatments  Talk to your doctor, nurse or pharmacist before following any medical regimen to see if it is safe and effective for you

## 2019-03-25 NOTE — PROGRESS NOTES
Surgical Oncology Follow Up       [address] [facility],[address] [place] [address]  [facility] [address] [address]    Alexey Guzman  1975  [id number]  [address]  [facility] [phone]    Chief Complaint   Patient presents with    Pre-op Exam          Assessment & Plan:   Patient is decided she would like us to perform her surgery so she presents for preoperative visit  In the meantime her genetic results returned pathogenic mutation for the JS gene as well as a VUS  for the PTEN gene  She was provided phone number to contact Genetics counselor but has not done so at this point  She is done some research on the web  I reviewed these mutations and provided her a copy of report of encouraged her to talk to their Genetics counselor  I did explain that the NCCN guidelines do not recommend prophylactic surgery for the ATN gene but do recommend consideration for heightened screening  Cancer History:        Ductal carcinoma in situ (DCIS) of right breast    2/25/2019 Biopsy     Right breast stereotatic biopsy  11 o'clock, anterior 3rd of the breast  Ductal carcinoma in situ  Grade 2  ER 90  ME 20    Concordant         3/14/2019 Genetic Testing     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  Variant of uncertain significance identified in PTEN  [facility]              Interval History:   Patient presents for a follow-up visit after her genetic testing  She has decided to have her surgery performed with us  See above  She will contact the genetics counselors for for further review  Review of Systems:   Review of Systems   All other systems reviewed and are negative        Past Medical History     Patient Active Problem List   Diagnosis    Ductal carcinoma in situ (DCIS) of right breast    Monoallelic mutation of JS gene     Past Medical History:   Diagnosis Date    Female infertility  Recurrent pregnancy loss, antepartum condition or complication     x 4     Past Surgical History:   Procedure Laterality Date     SECTION      MAMMO STEREOTACTIC BREAST BIOPSY RIGHT (ALL INC) Right 2019     Family History   Problem Relation Age of Onset    No Known Problems Mother     No Known Problems Father     No Known Problems Sister     Diabetes Brother     Hypertension Brother     No Known Problems Son     Breast cancer Maternal Grandmother 39    No Known Problems Maternal Grandfather     Breast cancer Paternal Grandmother 39    Diabetes Paternal Grandfather     Heart disease Paternal Grandfather     Hypertension Paternal Grandfather     No Known Problems Sister     Heart attack Maternal Aunt      Social History     Socioeconomic History    Marital status: Single     Spouse name: Not on file    Number of children: Not on file    Years of education: Not on file    Highest education level: Not on file   Occupational History    Not on file   Social Needs    Financial resource strain: Not on file    Food insecurity:     Worry: Not on file     Inability: Not on file    Transportation needs:     Medical: Not on file     Non-medical: Not on file   Tobacco Use    Smoking status: Never Smoker    Smokeless tobacco: Never Used   Substance and Sexual Activity    Alcohol use: No    Drug use: No    Sexual activity: Yes     Partners: Male     Birth control/protection: None   Lifestyle    Physical activity:     Days per week: Not on file     Minutes per session: Not on file    Stress: Not on file   Relationships    Social connections:     Talks on phone: Not on file     Gets together: Not on file     Attends Holiness service: Not on file     Active member of club or organization: Not on file     Attends meetings of clubs or organizations: Not on file     Relationship status: Not on file    Intimate partner violence:     Fear of current or ex partner: Not on file Emotionally abused: Not on file     Physically abused: Not on file     Forced sexual activity: Not on file   Other Topics Concern    Not on file   Social History Narrative    Not on file     No current outpatient medications on file  Allergies   Allergen Reactions    Shellfish-Derived Products Swelling     shrimp       Physical Exam:     Vitals:    03/25/19 1211   BP: 124/80   Pulse: 77   Resp: 16   Temp: 98 4 °F (36 9 °C)     Physical Exam   Constitutional: She is oriented to person, place, and time  She appears well-developed  HENT:   Mouth/Throat: Oropharynx is clear and moist    Eyes: Pupils are equal, round, and reactive to light  Right eye exhibits no discharge  Left eye exhibits no discharge  No scleral icterus  Cardiovascular: Normal rate, regular rhythm and normal heart sounds  Exam reveals no gallop and no friction rub  No murmur heard  Pulmonary/Chest: Effort normal and breath sounds normal  No stridor  No respiratory distress  She has no wheezes  She has no rales  She exhibits no tenderness  Examination the right breast demonstrates well-healed biopsy kory  There is no dominant mass or axillary adenopathy  The left breast shows no skin changes nipple discharge dominant masses or axillary adenopathy  Abdominal: Soft  She exhibits no distension  There is no tenderness  Musculoskeletal: Normal range of motion  She exhibits no edema  Neurological: She is alert and oriented to person, place, and time  Skin: Skin is warm and dry  Psychiatric: She has a normal mood and affect  Her behavior is normal  Judgment and thought content normal          Results:   I reviewed the genetic results with the patient I explained what a VUS was, as well as described the JS gene and elevated risk  We talked about height and screening but not prophylactic surgery    The patient was hoping not to have radiation therapy and I explained that I would tend to favor radiation therapy or least a consult with Radiation Oncology regarding this  We subsequent to the process of informed consent for the right breast needle localization lumpectomy  All questions were answered the patient's satisfaction  Advance Care Planning/Advance Directives:  I discussed the disease status, treatment plans and follow-up with the patient

## 2019-03-27 LAB
ALBUMIN SERPL-MCNC: 4.4 G/DL (ref 3.5–5.5)
ALBUMIN/GLOB SERPL: 1.6 {RATIO} (ref 1.2–2.2)
ALP SERPL-CCNC: 58 IU/L (ref 39–117)
ALT SERPL-CCNC: 12 IU/L (ref 0–32)
AST SERPL-CCNC: 16 IU/L (ref 0–40)
BASOPHILS # BLD AUTO: 0 X10E3/UL (ref 0–0.2)
BASOPHILS NFR BLD AUTO: 1 %
BILIRUB SERPL-MCNC: 0.4 MG/DL (ref 0–1.2)
BUN SERPL-MCNC: 9 MG/DL (ref 6–24)
BUN/CREAT SERPL: 10 (ref 9–23)
CALCIUM SERPL-MCNC: 9.1 MG/DL (ref 8.7–10.2)
CHLORIDE SERPL-SCNC: 104 MMOL/L (ref 96–106)
CO2 SERPL-SCNC: 21 MMOL/L (ref 20–29)
CREAT SERPL-MCNC: 0.9 MG/DL (ref 0.57–1)
EOSINOPHIL # BLD AUTO: 0.3 X10E3/UL (ref 0–0.4)
EOSINOPHIL NFR BLD AUTO: 5 %
ERYTHROCYTE [DISTWIDTH] IN BLOOD BY AUTOMATED COUNT: 13.4 % (ref 12.3–15.4)
GLOBULIN SER-MCNC: 2.8 G/DL (ref 1.5–4.5)
GLUCOSE SERPL-MCNC: 87 MG/DL (ref 65–99)
HCT VFR BLD AUTO: 42 % (ref 34–46.6)
HGB BLD-MCNC: 13.7 G/DL (ref 11.1–15.9)
IMM GRANULOCYTES # BLD: 0 X10E3/UL (ref 0–0.1)
IMM GRANULOCYTES NFR BLD: 0 %
LYMPHOCYTES # BLD AUTO: 2.1 X10E3/UL (ref 0.7–3.1)
LYMPHOCYTES NFR BLD AUTO: 34 %
MCH RBC QN AUTO: 29.7 PG (ref 26.6–33)
MCHC RBC AUTO-ENTMCNC: 32.6 G/DL (ref 31.5–35.7)
MCV RBC AUTO: 91 FL (ref 79–97)
MONOCYTES # BLD AUTO: 0.6 X10E3/UL (ref 0.1–0.9)
MONOCYTES NFR BLD AUTO: 10 %
NEUTROPHILS # BLD AUTO: 3.1 X10E3/UL (ref 1.4–7)
NEUTROPHILS NFR BLD AUTO: 50 %
PLATELET # BLD AUTO: 295 X10E3/UL (ref 150–379)
POTASSIUM SERPL-SCNC: 4.3 MMOL/L (ref 3.5–5.2)
PROT SERPL-MCNC: 7.2 G/DL (ref 6–8.5)
RBC # BLD AUTO: 4.61 X10E6/UL (ref 3.77–5.28)
SL AMB EGFR AFRICAN AMERICAN: 90 ML/MIN/1.73
SL AMB EGFR NON AFRICAN AMERICAN: 78 ML/MIN/1.73
SODIUM SERPL-SCNC: 139 MMOL/L (ref 134–144)
WBC # BLD AUTO: 6.2 X10E3/UL (ref 3.4–10.8)

## 2019-03-27 RX ORDER — DIPHENOXYLATE HYDROCHLORIDE AND ATROPINE SULFATE 2.5; .025 MG/1; MG/1
1 TABLET ORAL DAILY
COMMUNITY

## 2019-04-02 ENCOUNTER — ANESTHESIA EVENT (OUTPATIENT)
Dept: PERIOP | Facility: HOSPITAL | Age: 44
End: 2019-04-02
Payer: COMMERCIAL

## 2019-04-02 ENCOUNTER — HOSPITAL ENCOUNTER (OUTPATIENT)
Dept: MAMMOGRAPHY | Facility: HOSPITAL | Age: 44
Discharge: HOME | End: 2019-04-02
Attending: SURGERY
Payer: COMMERCIAL

## 2019-04-02 ENCOUNTER — APPOINTMENT (OUTPATIENT)
Dept: MAMMOGRAPHY | Facility: HOSPITAL | Age: 44
End: 2019-04-02
Payer: COMMERCIAL

## 2019-04-02 ENCOUNTER — ANESTHESIA (OUTPATIENT)
Dept: PERIOP | Facility: HOSPITAL | Age: 44
End: 2019-04-02
Payer: COMMERCIAL

## 2019-04-02 ENCOUNTER — HOSPITAL ENCOUNTER (OUTPATIENT)
Facility: HOSPITAL | Age: 44
Setting detail: OUTPATIENT SURGERY
Discharge: HOME | End: 2019-04-02
Attending: SURGERY | Admitting: SURGERY
Payer: COMMERCIAL

## 2019-04-02 VITALS
DIASTOLIC BLOOD PRESSURE: 79 MMHG | HEART RATE: 64 BPM | OXYGEN SATURATION: 100 % | SYSTOLIC BLOOD PRESSURE: 127 MMHG | TEMPERATURE: 97.7 F | BODY MASS INDEX: 22.47 KG/M2 | HEIGHT: 61 IN | RESPIRATION RATE: 16 BRPM | WEIGHT: 119 LBS

## 2019-04-02 VITALS — BODY MASS INDEX: 22.66 KG/M2 | HEIGHT: 61 IN | WEIGHT: 120 LBS

## 2019-04-02 DIAGNOSIS — D05.11 DUCTAL CARCINOMA IN SITU (DCIS) OF RIGHT BREAST: ICD-10-CM

## 2019-04-02 LAB — EXT PREGNANCY TEST URINE: NEGATIVE

## 2019-04-02 PROCEDURE — 88342 IMHCHEM/IMCYTCHM 1ST ANTB: CPT | Performed by: PATHOLOGY

## 2019-04-02 PROCEDURE — 88307 TISSUE EXAM BY PATHOLOGIST: CPT | Performed by: PATHOLOGY

## 2019-04-02 PROCEDURE — 19301 PARTIAL MASTECTOMY: CPT | Performed by: SURGERY

## 2019-04-02 PROCEDURE — 88341 IMHCHEM/IMCYTCHM EA ADD ANTB: CPT | Performed by: PATHOLOGY

## 2019-04-02 PROCEDURE — 19281 PERQ DEVICE BREAST 1ST IMAG: CPT

## 2019-04-02 PROCEDURE — 81025 URINE PREGNANCY TEST: CPT | Performed by: ANESTHESIOLOGY

## 2019-04-02 RX ORDER — GLYCOPYRROLATE 0.2 MG/ML
INJECTION INTRAMUSCULAR; INTRAVENOUS AS NEEDED
Status: DISCONTINUED | OUTPATIENT
Start: 2019-04-02 | End: 2019-04-02 | Stop reason: SURG

## 2019-04-02 RX ORDER — FENTANYL CITRATE 50 UG/ML
INJECTION, SOLUTION INTRAMUSCULAR; INTRAVENOUS AS NEEDED
Status: DISCONTINUED | OUTPATIENT
Start: 2019-04-02 | End: 2019-04-02 | Stop reason: SURG

## 2019-04-02 RX ORDER — ONDANSETRON 2 MG/ML
INJECTION INTRAMUSCULAR; INTRAVENOUS AS NEEDED
Status: DISCONTINUED | OUTPATIENT
Start: 2019-04-02 | End: 2019-04-02 | Stop reason: SURG

## 2019-04-02 RX ORDER — SODIUM CHLORIDE 9 MG/ML
125 INJECTION, SOLUTION INTRAVENOUS CONTINUOUS
Status: DISCONTINUED | OUTPATIENT
Start: 2019-04-02 | End: 2019-04-02 | Stop reason: HOSPADM

## 2019-04-02 RX ORDER — OXYCODONE HYDROCHLORIDE AND ACETAMINOPHEN 5; 325 MG/1; MG/1
1 TABLET ORAL EVERY 4 HOURS PRN
Status: DISCONTINUED | OUTPATIENT
Start: 2019-04-02 | End: 2019-04-02 | Stop reason: HOSPADM

## 2019-04-02 RX ORDER — DEXAMETHASONE SODIUM PHOSPHATE 10 MG/ML
INJECTION, SOLUTION INTRAMUSCULAR; INTRAVENOUS AS NEEDED
Status: DISCONTINUED | OUTPATIENT
Start: 2019-04-02 | End: 2019-04-02 | Stop reason: SURG

## 2019-04-02 RX ORDER — CEFAZOLIN SODIUM 1 G/50ML
1000 SOLUTION INTRAVENOUS ONCE
Status: DISCONTINUED | OUTPATIENT
Start: 2019-04-02 | End: 2019-04-02

## 2019-04-02 RX ORDER — LIDOCAINE HYDROCHLORIDE 10 MG/ML
INJECTION, SOLUTION INFILTRATION; PERINEURAL AS NEEDED
Status: DISCONTINUED | OUTPATIENT
Start: 2019-04-02 | End: 2019-04-02 | Stop reason: SURG

## 2019-04-02 RX ORDER — LIDOCAINE HYDROCHLORIDE 10 MG/ML
5 INJECTION, SOLUTION INFILTRATION; PERINEURAL ONCE
Status: COMPLETED | OUTPATIENT
Start: 2019-04-02 | End: 2019-04-02

## 2019-04-02 RX ORDER — FENTANYL CITRATE/PF 50 MCG/ML
25 SYRINGE (ML) INJECTION
Status: DISCONTINUED | OUTPATIENT
Start: 2019-04-02 | End: 2019-04-02 | Stop reason: HOSPADM

## 2019-04-02 RX ORDER — ONDANSETRON 2 MG/ML
4 INJECTION INTRAMUSCULAR; INTRAVENOUS ONCE AS NEEDED
Status: COMPLETED | OUTPATIENT
Start: 2019-04-02 | End: 2019-04-02

## 2019-04-02 RX ORDER — MEPERIDINE HYDROCHLORIDE 25 MG/ML
12.5 INJECTION INTRAMUSCULAR; INTRAVENOUS; SUBCUTANEOUS ONCE
Status: DISCONTINUED | OUTPATIENT
Start: 2019-04-02 | End: 2019-04-02 | Stop reason: HOSPADM

## 2019-04-02 RX ORDER — CEFAZOLIN SODIUM 1 G/50ML
1000 SOLUTION INTRAVENOUS ONCE
Status: COMPLETED | OUTPATIENT
Start: 2019-04-02 | End: 2019-04-02

## 2019-04-02 RX ORDER — MIDAZOLAM HYDROCHLORIDE 1 MG/ML
INJECTION INTRAMUSCULAR; INTRAVENOUS AS NEEDED
Status: DISCONTINUED | OUTPATIENT
Start: 2019-04-02 | End: 2019-04-02 | Stop reason: SURG

## 2019-04-02 RX ORDER — PROPOFOL 10 MG/ML
INJECTION, EMULSION INTRAVENOUS AS NEEDED
Status: DISCONTINUED | OUTPATIENT
Start: 2019-04-02 | End: 2019-04-02 | Stop reason: SURG

## 2019-04-02 RX ORDER — MAGNESIUM HYDROXIDE 1200 MG/15ML
LIQUID ORAL AS NEEDED
Status: DISCONTINUED | OUTPATIENT
Start: 2019-04-02 | End: 2019-04-02 | Stop reason: HOSPADM

## 2019-04-02 RX ADMIN — ONDANSETRON 4 MG: 2 INJECTION INTRAMUSCULAR; INTRAVENOUS at 12:15

## 2019-04-02 RX ADMIN — LIDOCAINE HYDROCHLORIDE ANHYDROUS 50 MG: 10 INJECTION, SOLUTION INFILTRATION at 10:22

## 2019-04-02 RX ADMIN — DEXAMETHASONE SODIUM PHOSPHATE 4 MG: 10 INJECTION, SOLUTION INTRAMUSCULAR; INTRAVENOUS at 10:25

## 2019-04-02 RX ADMIN — GLYCOPYRROLATE 0.1 MG: 0.2 INJECTION, SOLUTION INTRAMUSCULAR; INTRAVENOUS at 10:25

## 2019-04-02 RX ADMIN — PROPOFOL 150 MG: 10 INJECTION, EMULSION INTRAVENOUS at 10:22

## 2019-04-02 RX ADMIN — SODIUM CHLORIDE: 0.9 INJECTION, SOLUTION INTRAVENOUS at 07:56

## 2019-04-02 RX ADMIN — CEFAZOLIN SODIUM 1000 MG: 1 SOLUTION INTRAVENOUS at 10:08

## 2019-04-02 RX ADMIN — ONDANSETRON 4 MG: 2 INJECTION INTRAMUSCULAR; INTRAVENOUS at 11:20

## 2019-04-02 RX ADMIN — PROPOFOL 50 MG: 10 INJECTION, EMULSION INTRAVENOUS at 10:36

## 2019-04-02 RX ADMIN — FENTANYL CITRATE 25 MCG: 50 INJECTION INTRAMUSCULAR; INTRAVENOUS at 10:36

## 2019-04-02 RX ADMIN — LIDOCAINE HYDROCHLORIDE ANHYDROUS 5 ML: 10 INJECTION, SOLUTION INFILTRATION at 08:15

## 2019-04-02 RX ADMIN — MIDAZOLAM HYDROCHLORIDE 2 MG: 1 INJECTION, SOLUTION INTRAMUSCULAR; INTRAVENOUS at 10:16

## 2019-04-02 RX ADMIN — FENTANYL CITRATE 25 MCG: 50 INJECTION INTRAMUSCULAR; INTRAVENOUS at 11:15

## 2019-04-02 RX ADMIN — FENTANYL CITRATE 25 MCG: 50 INJECTION INTRAMUSCULAR; INTRAVENOUS at 11:02

## 2019-04-02 RX ADMIN — FENTANYL CITRATE 25 MCG: 50 INJECTION INTRAMUSCULAR; INTRAVENOUS at 11:12

## 2019-04-05 ENCOUNTER — TELEPHONE (OUTPATIENT)
Dept: SURGICAL ONCOLOGY | Facility: CLINIC | Age: 44
End: 2019-04-05

## 2019-04-15 ENCOUNTER — OFFICE VISIT (OUTPATIENT)
Dept: SURGICAL ONCOLOGY | Facility: CLINIC | Age: 44
End: 2019-04-15

## 2019-04-15 ENCOUNTER — TELEPHONE (OUTPATIENT)
Dept: HEMATOLOGY ONCOLOGY | Facility: CLINIC | Age: 44
End: 2019-04-15

## 2019-04-15 ENCOUNTER — DOCUMENTATION (OUTPATIENT)
Dept: SURGICAL ONCOLOGY | Facility: CLINIC | Age: 44
End: 2019-04-15

## 2019-04-15 VITALS
BODY MASS INDEX: 23.03 KG/M2 | TEMPERATURE: 98 F | HEART RATE: 76 BPM | HEIGHT: 61 IN | DIASTOLIC BLOOD PRESSURE: 60 MMHG | WEIGHT: 122 LBS | SYSTOLIC BLOOD PRESSURE: 100 MMHG | RESPIRATION RATE: 16 BRPM

## 2019-04-15 DIAGNOSIS — D05.11 DUCTAL CARCINOMA IN SITU (DCIS) OF RIGHT BREAST: Primary | ICD-10-CM

## 2019-04-15 DIAGNOSIS — Z98.890 STATUS POST RIGHT BREAST LUMPECTOMY: ICD-10-CM

## 2019-04-15 DIAGNOSIS — Z15.89 MONOALLELIC MUTATION OF ATM GENE: ICD-10-CM

## 2019-04-15 DIAGNOSIS — Z15.09 MONOALLELIC MUTATION OF ATM GENE: ICD-10-CM

## 2019-04-15 DIAGNOSIS — Z15.01 MONOALLELIC MUTATION OF ATM GENE: ICD-10-CM

## 2019-04-15 PROCEDURE — 99024 POSTOP FOLLOW-UP VISIT: CPT | Performed by: SURGERY

## 2019-04-16 ENCOUNTER — TELEPHONE (OUTPATIENT)
Dept: HEMATOLOGY ONCOLOGY | Facility: CLINIC | Age: 44
End: 2019-04-16

## 2019-04-16 ENCOUNTER — CONSULT (OUTPATIENT)
Dept: HEMATOLOGY ONCOLOGY | Facility: CLINIC | Age: 44
End: 2019-04-16
Payer: COMMERCIAL

## 2019-04-16 VITALS
TEMPERATURE: 98.1 F | HEART RATE: 79 BPM | WEIGHT: 125 LBS | DIASTOLIC BLOOD PRESSURE: 70 MMHG | BODY MASS INDEX: 23.6 KG/M2 | SYSTOLIC BLOOD PRESSURE: 102 MMHG | HEIGHT: 61 IN | RESPIRATION RATE: 18 BRPM | OXYGEN SATURATION: 99 %

## 2019-04-16 DIAGNOSIS — D05.11 DUCTAL CARCINOMA IN SITU (DCIS) OF RIGHT BREAST: Primary | ICD-10-CM

## 2019-04-16 PROCEDURE — 99244 OFF/OP CNSLTJ NEW/EST MOD 40: CPT | Performed by: INTERNAL MEDICINE

## 2019-04-23 ENCOUNTER — RADIATION ONCOLOGY CONSULT (OUTPATIENT)
Dept: RADIATION ONCOLOGY | Facility: HOSPITAL | Age: 44
End: 2019-04-23
Attending: RADIOLOGY
Payer: COMMERCIAL

## 2019-04-23 VITALS
BODY MASS INDEX: 22.92 KG/M2 | SYSTOLIC BLOOD PRESSURE: 100 MMHG | HEIGHT: 61 IN | HEART RATE: 85 BPM | RESPIRATION RATE: 16 BRPM | WEIGHT: 121.4 LBS | DIASTOLIC BLOOD PRESSURE: 58 MMHG

## 2019-04-23 DIAGNOSIS — D05.11 DUCTAL CARCINOMA IN SITU (DCIS) OF RIGHT BREAST: Primary | ICD-10-CM

## 2019-04-23 PROCEDURE — 99214 OFFICE O/P EST MOD 30 MIN: CPT | Performed by: RADIOLOGY

## 2019-04-23 PROCEDURE — G0463 HOSPITAL OUTPT CLINIC VISIT: HCPCS | Performed by: RADIOLOGY

## 2019-04-30 ENCOUNTER — APPOINTMENT (OUTPATIENT)
Dept: RADIATION ONCOLOGY | Facility: HOSPITAL | Age: 44
End: 2019-04-30
Attending: RADIOLOGY
Payer: COMMERCIAL

## 2019-04-30 PROCEDURE — 77332 RADIATION TREATMENT AID(S): CPT | Performed by: RADIOLOGY

## 2019-04-30 PROCEDURE — 77290 THER RAD SIMULAJ FIELD CPLX: CPT | Performed by: RADIOLOGY

## 2019-05-01 ENCOUNTER — APPOINTMENT (OUTPATIENT)
Dept: RADIATION ONCOLOGY | Facility: HOSPITAL | Age: 44
End: 2019-05-01
Attending: RADIOLOGY
Payer: COMMERCIAL

## 2019-05-03 PROCEDURE — 77334 RADIATION TREATMENT AID(S): CPT | Performed by: RADIOLOGY

## 2019-05-03 PROCEDURE — 77263 THER RADIOLOGY TX PLNG CPLX: CPT | Performed by: RADIOLOGY

## 2019-05-03 PROCEDURE — 77300 RADIATION THERAPY DOSE PLAN: CPT | Performed by: RADIOLOGY

## 2019-05-10 ENCOUNTER — APPOINTMENT (OUTPATIENT)
Dept: RADIATION ONCOLOGY | Facility: HOSPITAL | Age: 44
End: 2019-05-10
Attending: RADIOLOGY
Payer: COMMERCIAL

## 2019-05-10 PROCEDURE — 77280 THER RAD SIMULAJ FIELD SMPL: CPT | Performed by: RADIOLOGY

## 2019-05-13 ENCOUNTER — APPOINTMENT (OUTPATIENT)
Dept: RADIATION ONCOLOGY | Facility: HOSPITAL | Age: 44
End: 2019-05-13
Attending: RADIOLOGY
Payer: COMMERCIAL

## 2019-05-13 PROCEDURE — 77412 RADIATION TX DELIVERY LVL 3: CPT | Performed by: RADIOLOGY

## 2019-05-14 ENCOUNTER — APPOINTMENT (OUTPATIENT)
Dept: RADIATION ONCOLOGY | Facility: HOSPITAL | Age: 44
End: 2019-05-14
Attending: RADIOLOGY
Payer: COMMERCIAL

## 2019-05-14 DIAGNOSIS — D05.11 DUCTAL CARCINOMA IN SITU (DCIS) OF RIGHT BREAST: Primary | ICD-10-CM

## 2019-05-14 PROCEDURE — 77412 RADIATION TX DELIVERY LVL 3: CPT | Performed by: RADIOLOGY

## 2019-05-14 PROCEDURE — 77331 SPECIAL RADIATION DOSIMETRY: CPT | Performed by: RADIOLOGY

## 2019-05-15 ENCOUNTER — APPOINTMENT (OUTPATIENT)
Dept: RADIATION ONCOLOGY | Facility: HOSPITAL | Age: 44
End: 2019-05-15
Attending: RADIOLOGY
Payer: COMMERCIAL

## 2019-05-15 PROCEDURE — 77412 RADIATION TX DELIVERY LVL 3: CPT | Performed by: RADIOLOGY

## 2019-05-16 ENCOUNTER — APPOINTMENT (OUTPATIENT)
Dept: RADIATION ONCOLOGY | Facility: HOSPITAL | Age: 44
End: 2019-05-16
Attending: RADIOLOGY
Payer: COMMERCIAL

## 2019-05-16 PROCEDURE — 77412 RADIATION TX DELIVERY LVL 3: CPT | Performed by: RADIOLOGY

## 2019-05-17 ENCOUNTER — APPOINTMENT (OUTPATIENT)
Dept: RADIATION ONCOLOGY | Facility: HOSPITAL | Age: 44
End: 2019-05-17
Attending: RADIOLOGY
Payer: COMMERCIAL

## 2019-05-17 PROCEDURE — 77336 RADIATION PHYSICS CONSULT: CPT | Performed by: RADIOLOGY

## 2019-05-17 PROCEDURE — 77417 THER RADIOLOGY PORT IMAGE(S): CPT | Performed by: RADIOLOGY

## 2019-05-17 PROCEDURE — 77412 RADIATION TX DELIVERY LVL 3: CPT | Performed by: RADIOLOGY

## 2019-05-20 ENCOUNTER — APPOINTMENT (OUTPATIENT)
Dept: RADIATION ONCOLOGY | Facility: HOSPITAL | Age: 44
End: 2019-05-20
Attending: RADIOLOGY
Payer: COMMERCIAL

## 2019-05-20 PROCEDURE — 77412 RADIATION TX DELIVERY LVL 3: CPT | Performed by: RADIOLOGY

## 2019-05-21 ENCOUNTER — APPOINTMENT (OUTPATIENT)
Dept: RADIATION ONCOLOGY | Facility: HOSPITAL | Age: 44
End: 2019-05-21
Attending: RADIOLOGY
Payer: COMMERCIAL

## 2019-05-21 PROCEDURE — 77412 RADIATION TX DELIVERY LVL 3: CPT | Performed by: RADIOLOGY

## 2019-05-22 ENCOUNTER — APPOINTMENT (OUTPATIENT)
Dept: RADIATION ONCOLOGY | Facility: HOSPITAL | Age: 44
End: 2019-05-22
Attending: RADIOLOGY
Payer: COMMERCIAL

## 2019-05-22 LAB
BASOPHILS # BLD AUTO: 0 X10E3/UL (ref 0–0.2)
BASOPHILS NFR BLD AUTO: 1 %
EOSINOPHIL # BLD AUTO: 0.3 X10E3/UL (ref 0–0.4)
EOSINOPHIL NFR BLD AUTO: 4 %
ERYTHROCYTE [DISTWIDTH] IN BLOOD BY AUTOMATED COUNT: 13 % (ref 12.3–15.4)
HCT VFR BLD AUTO: 39.4 % (ref 34–46.6)
HGB BLD-MCNC: 13 G/DL (ref 11.1–15.9)
IMM GRANULOCYTES # BLD: 0 X10E3/UL (ref 0–0.1)
IMM GRANULOCYTES NFR BLD: 0 %
LYMPHOCYTES # BLD AUTO: 1.5 X10E3/UL (ref 0.7–3.1)
LYMPHOCYTES NFR BLD AUTO: 21 %
MCH RBC QN AUTO: 29.3 PG (ref 26.6–33)
MCHC RBC AUTO-ENTMCNC: 33 G/DL (ref 31.5–35.7)
MCV RBC AUTO: 89 FL (ref 79–97)
MONOCYTES # BLD AUTO: 0.7 X10E3/UL (ref 0.1–0.9)
MONOCYTES NFR BLD AUTO: 9 %
NEUTROPHILS # BLD AUTO: 4.7 X10E3/UL (ref 1.4–7)
NEUTROPHILS NFR BLD AUTO: 65 %
PLATELET # BLD AUTO: 318 X10E3/UL (ref 150–450)
RBC # BLD AUTO: 4.43 X10E6/UL (ref 3.77–5.28)
WBC # BLD AUTO: 7.2 X10E3/UL (ref 3.4–10.8)

## 2019-05-22 PROCEDURE — 77412 RADIATION TX DELIVERY LVL 3: CPT | Performed by: RADIOLOGY

## 2019-05-23 ENCOUNTER — APPOINTMENT (OUTPATIENT)
Dept: RADIATION ONCOLOGY | Facility: HOSPITAL | Age: 44
End: 2019-05-23
Attending: RADIOLOGY
Payer: COMMERCIAL

## 2019-05-23 PROCEDURE — 77412 RADIATION TX DELIVERY LVL 3: CPT | Performed by: RADIOLOGY

## 2019-05-24 ENCOUNTER — APPOINTMENT (OUTPATIENT)
Dept: RADIATION ONCOLOGY | Facility: HOSPITAL | Age: 44
End: 2019-05-24
Attending: RADIOLOGY
Payer: COMMERCIAL

## 2019-05-24 PROCEDURE — 77336 RADIATION PHYSICS CONSULT: CPT | Performed by: RADIOLOGY

## 2019-05-24 PROCEDURE — 77417 THER RADIOLOGY PORT IMAGE(S): CPT | Performed by: RADIOLOGY

## 2019-05-24 PROCEDURE — 77412 RADIATION TX DELIVERY LVL 3: CPT | Performed by: RADIOLOGY

## 2019-05-28 ENCOUNTER — APPOINTMENT (OUTPATIENT)
Dept: RADIATION ONCOLOGY | Facility: HOSPITAL | Age: 44
End: 2019-05-28
Attending: RADIOLOGY
Payer: COMMERCIAL

## 2019-05-28 PROCEDURE — 77412 RADIATION TX DELIVERY LVL 3: CPT | Performed by: RADIOLOGY

## 2019-05-29 ENCOUNTER — APPOINTMENT (OUTPATIENT)
Dept: RADIATION ONCOLOGY | Facility: HOSPITAL | Age: 44
End: 2019-05-29
Attending: RADIOLOGY
Payer: COMMERCIAL

## 2019-05-29 PROCEDURE — 77412 RADIATION TX DELIVERY LVL 3: CPT | Performed by: RADIOLOGY

## 2019-05-30 ENCOUNTER — APPOINTMENT (OUTPATIENT)
Dept: RADIATION ONCOLOGY | Facility: HOSPITAL | Age: 44
End: 2019-05-30
Attending: RADIOLOGY
Payer: COMMERCIAL

## 2019-05-30 PROCEDURE — 77412 RADIATION TX DELIVERY LVL 3: CPT | Performed by: RADIOLOGY

## 2019-05-31 ENCOUNTER — APPOINTMENT (OUTPATIENT)
Dept: RADIATION ONCOLOGY | Facility: HOSPITAL | Age: 44
End: 2019-05-31
Attending: RADIOLOGY
Payer: COMMERCIAL

## 2019-05-31 PROCEDURE — 77412 RADIATION TX DELIVERY LVL 3: CPT | Performed by: RADIOLOGY

## 2019-06-03 ENCOUNTER — APPOINTMENT (OUTPATIENT)
Dept: RADIATION ONCOLOGY | Facility: HOSPITAL | Age: 44
End: 2019-06-03
Attending: RADIOLOGY
Payer: COMMERCIAL

## 2019-06-03 PROCEDURE — 77412 RADIATION TX DELIVERY LVL 3: CPT | Performed by: RADIOLOGY

## 2019-06-03 PROCEDURE — 77336 RADIATION PHYSICS CONSULT: CPT | Performed by: RADIOLOGY

## 2019-06-03 PROCEDURE — 77417 THER RADIOLOGY PORT IMAGE(S): CPT | Performed by: RADIOLOGY

## 2019-06-04 ENCOUNTER — APPOINTMENT (OUTPATIENT)
Dept: RADIATION ONCOLOGY | Facility: HOSPITAL | Age: 44
End: 2019-06-04
Attending: RADIOLOGY
Payer: COMMERCIAL

## 2019-06-04 PROCEDURE — 77412 RADIATION TX DELIVERY LVL 3: CPT | Performed by: RADIOLOGY

## 2019-06-05 ENCOUNTER — APPOINTMENT (OUTPATIENT)
Dept: RADIATION ONCOLOGY | Facility: HOSPITAL | Age: 44
End: 2019-06-05
Attending: RADIOLOGY
Payer: COMMERCIAL

## 2019-06-05 PROCEDURE — 77321 SPECIAL TELETX PORT PLAN: CPT | Performed by: RADIOLOGY

## 2019-06-05 PROCEDURE — 77280 THER RAD SIMULAJ FIELD SMPL: CPT | Performed by: RADIOLOGY

## 2019-06-05 PROCEDURE — 77334 RADIATION TREATMENT AID(S): CPT | Performed by: RADIOLOGY

## 2019-06-05 PROCEDURE — 77412 RADIATION TX DELIVERY LVL 3: CPT | Performed by: RADIOLOGY

## 2019-06-06 ENCOUNTER — APPOINTMENT (OUTPATIENT)
Dept: RADIATION ONCOLOGY | Facility: HOSPITAL | Age: 44
End: 2019-06-06
Attending: RADIOLOGY
Payer: COMMERCIAL

## 2019-06-06 PROCEDURE — 77412 RADIATION TX DELIVERY LVL 3: CPT | Performed by: RADIOLOGY

## 2019-06-06 PROCEDURE — 77331 SPECIAL RADIATION DOSIMETRY: CPT | Performed by: RADIOLOGY

## 2019-06-07 ENCOUNTER — APPOINTMENT (OUTPATIENT)
Dept: RADIATION ONCOLOGY | Facility: HOSPITAL | Age: 44
End: 2019-06-07
Attending: RADIOLOGY
Payer: COMMERCIAL

## 2019-06-07 PROCEDURE — 77412 RADIATION TX DELIVERY LVL 3: CPT | Performed by: RADIOLOGY

## 2019-06-10 ENCOUNTER — APPOINTMENT (OUTPATIENT)
Dept: RADIATION ONCOLOGY | Facility: HOSPITAL | Age: 44
End: 2019-06-10
Attending: RADIOLOGY
Payer: COMMERCIAL

## 2019-06-10 PROCEDURE — 77336 RADIATION PHYSICS CONSULT: CPT | Performed by: RADIOLOGY

## 2019-06-10 PROCEDURE — 77412 RADIATION TX DELIVERY LVL 3: CPT | Performed by: RADIOLOGY

## 2019-06-11 ENCOUNTER — APPOINTMENT (OUTPATIENT)
Dept: RADIATION ONCOLOGY | Facility: HOSPITAL | Age: 44
End: 2019-06-11
Attending: RADIOLOGY
Payer: COMMERCIAL

## 2019-06-11 ENCOUNTER — OFFICE VISIT (OUTPATIENT)
Dept: INTERNAL MEDICINE CLINIC | Facility: CLINIC | Age: 44
End: 2019-06-11
Payer: COMMERCIAL

## 2019-06-11 VITALS
DIASTOLIC BLOOD PRESSURE: 70 MMHG | OXYGEN SATURATION: 98 % | HEIGHT: 61 IN | SYSTOLIC BLOOD PRESSURE: 112 MMHG | WEIGHT: 120.4 LBS | TEMPERATURE: 98.7 F | RESPIRATION RATE: 16 BRPM | HEART RATE: 68 BPM | BODY MASS INDEX: 22.73 KG/M2

## 2019-06-11 DIAGNOSIS — Z00.00 HEALTH MAINTENANCE EXAMINATION: Primary | ICD-10-CM

## 2019-06-11 DIAGNOSIS — D05.11 DUCTAL CARCINOMA IN SITU (DCIS) OF RIGHT BREAST: ICD-10-CM

## 2019-06-11 DIAGNOSIS — E55.9 VITAMIN D DEFICIENCY: ICD-10-CM

## 2019-06-11 DIAGNOSIS — I95.9 HYPOTENSION, UNSPECIFIED HYPOTENSION TYPE: ICD-10-CM

## 2019-06-11 DIAGNOSIS — Z13.220 SCREENING, LIPID: ICD-10-CM

## 2019-06-11 PROBLEM — K59.09 OTHER CONSTIPATION: Status: ACTIVE | Noted: 2019-06-11

## 2019-06-11 PROCEDURE — 77412 RADIATION TX DELIVERY LVL 3: CPT | Performed by: RADIOLOGY

## 2019-06-11 PROCEDURE — 99396 PREV VISIT EST AGE 40-64: CPT | Performed by: INTERNAL MEDICINE

## 2019-06-18 LAB
25(OH)D3+25(OH)D2 SERPL-MCNC: 42.6 NG/ML (ref 30–100)
CHOLEST SERPL-MCNC: 181 MG/DL (ref 100–199)
CHOLEST/HDLC SERPL: 2.5 RATIO (ref 0–4.4)
HDLC SERPL-MCNC: 71 MG/DL
LDLC SERPL CALC-MCNC: 100 MG/DL (ref 0–99)
SL AMB VLDL CHOLESTEROL CALC: 10 MG/DL (ref 5–40)
TRIGL SERPL-MCNC: 52 MG/DL (ref 0–149)
TSH SERPL DL<=0.005 MIU/L-ACNC: 3.03 UIU/ML (ref 0.45–4.5)

## 2019-06-20 ENCOUNTER — TELEPHONE (OUTPATIENT)
Dept: INTERNAL MEDICINE CLINIC | Facility: CLINIC | Age: 44
End: 2019-06-20

## 2019-07-29 ENCOUNTER — OFFICE VISIT (OUTPATIENT)
Dept: SURGICAL ONCOLOGY | Facility: CLINIC | Age: 44
End: 2019-07-29
Payer: COMMERCIAL

## 2019-07-29 ENCOUNTER — RADIATION ONCOLOGY FOLLOW-UP (OUTPATIENT)
Dept: RADIATION ONCOLOGY | Facility: HOSPITAL | Age: 44
End: 2019-07-29
Attending: RADIOLOGY
Payer: COMMERCIAL

## 2019-07-29 ENCOUNTER — CLINICAL SUPPORT (OUTPATIENT)
Dept: RADIATION ONCOLOGY | Facility: HOSPITAL | Age: 44
End: 2019-07-29
Attending: RADIOLOGY

## 2019-07-29 VITALS
TEMPERATURE: 97.1 F | HEART RATE: 73 BPM | OXYGEN SATURATION: 98 % | WEIGHT: 121 LBS | DIASTOLIC BLOOD PRESSURE: 70 MMHG | SYSTOLIC BLOOD PRESSURE: 108 MMHG | BODY MASS INDEX: 22.86 KG/M2 | RESPIRATION RATE: 16 BRPM

## 2019-07-29 VITALS
SYSTOLIC BLOOD PRESSURE: 110 MMHG | DIASTOLIC BLOOD PRESSURE: 70 MMHG | BODY MASS INDEX: 22.84 KG/M2 | TEMPERATURE: 98.3 F | WEIGHT: 121 LBS | HEART RATE: 71 BPM | HEIGHT: 61 IN | RESPIRATION RATE: 16 BRPM

## 2019-07-29 DIAGNOSIS — D05.11 DUCTAL CARCINOMA IN SITU (DCIS) OF RIGHT BREAST: Primary | ICD-10-CM

## 2019-07-29 DIAGNOSIS — Z15.09 MONOALLELIC MUTATION OF ATM GENE: ICD-10-CM

## 2019-07-29 DIAGNOSIS — Z15.01 MONOALLELIC MUTATION OF ATM GENE: ICD-10-CM

## 2019-07-29 DIAGNOSIS — Z15.89 MONOALLELIC MUTATION OF ATM GENE: ICD-10-CM

## 2019-07-29 PROCEDURE — 99213 OFFICE O/P EST LOW 20 MIN: CPT | Performed by: SURGERY

## 2019-07-29 PROCEDURE — G0463 HOSPITAL OUTPT CLINIC VISIT: HCPCS | Performed by: RADIOLOGY

## 2019-07-29 PROCEDURE — 99211 OFF/OP EST MAY X REQ PHY/QHP: CPT | Performed by: RADIOLOGY

## 2019-07-29 NOTE — PROGRESS NOTES
Milagro Sahni 1975 is a 38 y o  female       Follow up visit     Oncology History    Patient returns for first follow-up s/p completion of radiation to the right breast on 6/11/19     38year old premenopausal female with a newly diagnosed right breast ductal carcinoma in situ, grade 3 that was ER positive at 90 percent and IL positive at 20 percent  She is s/p lumpectomy with negative margins on April 2, 2019  We recommended radiation therapy to the entire right breast  She elected not to take Tamoxifen  She tolerated radiation well with mild to moderate skin erythema applying remedy lotion daily  She did call the office one week post treatment with concerned about delayed skin reaction with peeling, pink and raw as well as shooting pains  She was assured this is all normal and will take some time to recover  She was applying cortisone cream per notes       7/29/19 Surg Onc follow-up with Dr Arvin Hall          Ductal carcinoma in situ (DCIS) of right breast    2/25/2019 Biopsy     Right breast stereotatic biopsy  11 o'clock, anterior 3rd of the breast  Ductal carcinoma in situ  Grade 2  ER 90  IL 20    Concordant      3/14/2019 Genetic Testing     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  Variant of uncertain significance identified in PTEN  [facility]      4/2/2019 Surgery     Right lumpectomy  Ductal carcinoma in situ  1 7 cm  Grade 3  Margins negative  Stage 0      4/16/2019 - 4/16/2019 Hormone Therapy     Declined hormone therapy  Consult with Monika Tejada      5/13/2019 - 6/11/2019 Radiation     Dr Rafal Mejia  Course: C1  Right breast, 16 fractions, total dose: 4256 cGy  Right breast cone down, 5 fractions, total dose: 1000 cGy         Clinical Trial: No    Health Maintenance   Topic Date Due    Pneumococcal Vaccine: Pediatrics (0 to 5 Years) and At-Risk Patients (6 to 64 Years) (1 of 3 - PCV13) 05/09/1980    DTaP,Tdap,and Td Vaccines (1 - Tdap) 05/09/1995    INFLUENZA VACCINE  07/01/2019    MAMMOGRAM  2020    Depression Screening PHQ  2020    BMI: Adult  2020    Pneumococcal Vaccine: 65+ Years (1 of 2 - PCV13) 2039    HEPATITIS B VACCINES  Aged Out       Patient Active Problem List   Diagnosis    Ductal carcinoma in situ (DCIS) of right breast    Monoallelic mutation of JS gene    Vitamin D deficiency    Other constipation     Past Medical History:   Diagnosis Date    BRCA negative 2019    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; [employer]    Female infertility     Recurrent pregnancy loss, antepartum condition or complication     x 4     Past Surgical History:   Procedure Laterality Date    BREAST LUMPECTOMY Right 2019    Procedure: BREAST LUMPECTOMY; BREAST NEEDLE LOCALIZATION (NEEDLE LOC AT 0800);   Surgeon: Christine Maria MD;  [facility];  Service: Surgical Oncology     SECTION      INDUCED       x3    MAMMO NEEDLE LOCALIZATION RIGHT (ALL INC) Right 2019    MAMMO STEREOTACTIC BREAST BIOPSY RIGHT (ALL INC) Right 2019     Family History   Problem Relation Age of Onset    No Known Problems Mother     No Known Problems Father     No Known Problems Sister     Diabetes Brother     Hypertension Brother     No Known Problems Son     Breast cancer Maternal Grandmother 39    Diabetes Maternal Grandmother     Heart disease Maternal Grandfather     Diabetes Maternal Grandfather     Breast cancer Paternal Grandmother 39    Diabetes Paternal Grandfather     Heart disease Paternal Grandfather     Hypertension Paternal Grandfather     No Known Problems Sister     Heart attack Maternal Aunt      Social History     Socioeconomic History    Marital status: /Civil Union     Spouse name: Not on file    Number of children: Not on file    Years of education: Not on file    Highest education level: Not on file   Occupational History    Not on file   Social Needs    Financial resource strain: Not on file    Food insecurity:     Worry: Not on file     Inability: Not on file    Transportation needs:     Medical: Not on file     Non-medical: Not on file   Tobacco Use    Smoking status: Never Smoker    Smokeless tobacco: Never Used   Substance and Sexual Activity    Alcohol use: Yes     Comment: rarely    Drug use: No    Sexual activity: Yes     Partners: Male     Birth control/protection: None   Lifestyle    Physical activity:     Days per week: Not on file     Minutes per session: Not on file    Stress: Not on file   Relationships    Social connections:     Talks on phone: Not on file     Gets together: Not on file     Attends Yazdanism service: Not on file     Active member of club or organization: Not on file     Attends meetings of clubs or organizations: Not on file     Relationship status: Not on file    Intimate partner violence:     Fear of current or ex partner: Not on file     Emotionally abused: Not on file     Physically abused: Not on file     Forced sexual activity: Not on file   Other Topics Concern    Not on file   Social History Narrative    Drinks coffee    Second Marriage    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       Current Outpatient Medications:     multivitamin (THERAGRAN) TABS, Take 1 tablet by mouth daily, Disp: , Rfl:   Allergies   Allergen Reactions    Shellfish-Derived Products Swelling     shrimp       Review of Systems:  Review of Systems   Constitutional: Negative  Fatigue improving since end of treatment   HENT: Negative  Eyes: Negative  Respiratory: Negative  Cardiovascular: Negative  Gastrointestinal: Negative  Endocrine: Negative  Genitourinary: Negative  Musculoskeletal: Negative  Skin: Negative  Skin hyperpigmented, healing from RT  Occasional twinges of pain in right breast   Allergic/Immunologic: Negative  Neurological: Negative  Hematological: Negative  Psychiatric/Behavioral: Negative          Vitals:    07/29/19 0758   BP: 108/70   BP Location: Left arm Pulse: 73   Resp: 16   Temp: (!) 97 1 °F (36 2 °C)   TempSrc: Temporal   SpO2: 98%   Weight: 54 9 kg (121 lb)            Imaging:No results found

## 2019-07-29 NOTE — PROGRESS NOTES
Surgical Oncology Follow Up       [address]  [facility] [place]  [address]  [place] [address]    Taniya Klein  1975  [id number]  [address]  [facility] [place]  [address]  [place] [address] [id number]    Chief Complaint   Patient presents with    Follow-up     3 MONTH FOLLOW UP  Assessment & Plan:    patient presents for a three-month follow-up visit  She has no complaints referable to her breast   She has completed her radiation therapy  She declined hormonal therapy  Clinical examination shows no evidence of local regional or distant recurrence disease  I recommended annual mammography   I will coordinate that for her starting in  December  I have also recommended she speak with her gynecologist about her AT him mutation to see if there is any increase surveillance that might be warranted  She is not spoken with a Genetics counselor though this has been offered by myself as well as Alan Shah  We will see her back in 6 months or sooner should she develop any signs or symptoms  All questions were answered the patient's satisfaction      Cancer History:        Ductal carcinoma in situ (DCIS) of right breast    2/25/2019 Biopsy     Right breast stereotatic biopsy  11 o'clock, anterior 3rd of the breast  Ductal carcinoma in situ  Grade 2  ER 90  NM 20    Concordant      3/14/2019 Genetic Testing     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  Variant of uncertain significance identified in PTEN  [facility]      4/2/2019 Surgery     Right lumpectomy  Ductal carcinoma in situ  1 7 cm  Grade 3  Margins negative  Stage 0      4/16/2019 - 4/16/2019 Hormone Therapy     Declined hormone therapy  Consult with Dot Steel      5/13/2019 - 6/11/2019 Radiation     Dr Jinny Mcdonnell  Course: C1  Right breast, 16 fractions, total dose: 4256 cGy  Right breast cone down, 5 fractions, total dose: 1000 cGy Interval History:    see above    Review of Systems:   Review of Systems   Constitutional: Negative for activity change, appetite change and fatigue  HENT: Negative  Eyes: Negative  Respiratory: Negative for cough, shortness of breath and wheezing  Cardiovascular: Negative for chest pain and leg swelling  Gastrointestinal: Negative  Endocrine: Negative  Genitourinary: Negative  Musculoskeletal:        No new changes or complaints of bone pain   Skin: Negative  Allergic/Immunologic: Negative  Neurological: Negative  Hematological: Negative  Psychiatric/Behavioral: Negative  Past Medical History     Patient Active Problem List   Diagnosis    Ductal carcinoma in situ (DCIS) of right breast    Monoallelic mutation of JS gene    Vitamin D deficiency    Other constipation     Past Medical History:   Diagnosis Date    BRCA negative 2019    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; [facility]    Female infertility     Recurrent pregnancy loss, antepartum condition or complication     x 4     Past Surgical History:   Procedure Laterality Date    BREAST LUMPECTOMY Right 2019    Procedure: BREAST LUMPECTOMY; BREAST NEEDLE LOCALIZATION (NEEDLE LOC AT 0800);   Surgeon: Chelsi Mcneil MD;  [facility];  Service: Surgical Oncology     SECTION      INDUCED       x3    MAMMO NEEDLE LOCALIZATION RIGHT (ALL INC) Right 2019    MAMMO STEREOTACTIC BREAST BIOPSY RIGHT (ALL INC) Right 2019     Family History   Problem Relation Age of Onset    No Known Problems Mother     No Known Problems Father     No Known Problems Sister     Diabetes Brother     Hypertension Brother     No Known Problems Son     Breast cancer Maternal Grandmother 39    Diabetes Maternal Grandmother     Heart disease Maternal Grandfather     Diabetes Maternal Grandfather     Breast cancer Paternal Grandmother 39    Diabetes Paternal Grandfather     Heart disease Paternal Grandfather  Hypertension Paternal Grandfather     No Known Problems Sister     Heart attack Maternal Aunt      Social History     Socioeconomic History    Marital status: /Civil Union     Spouse name: Not on file    Number of children: Not on file    Years of education: Not on file    Highest education level: Not on file   Occupational History    Not on file   Social Needs    Financial resource strain: Not on file    Food insecurity:     Worry: Not on file     Inability: Not on file    Transportation needs:     Medical: Not on file     Non-medical: Not on file   Tobacco Use    Smoking status: Never Smoker    Smokeless tobacco: Never Used   Substance and Sexual Activity    Alcohol use: Yes     Comment: rarely    Drug use: No    Sexual activity: Yes     Partners: Male     Birth control/protection: None   Lifestyle    Physical activity:     Days per week: Not on file     Minutes per session: Not on file    Stress: Not on file   Relationships    Social connections:     Talks on phone: Not on file     Gets together: Not on file     Attends Sikh service: Not on file     Active member of club or organization: Not on file     Attends meetings of clubs or organizations: Not on file     Relationship status: Not on file    Intimate partner violence:     Fear of current or ex partner: Not on file     Emotionally abused: Not on file     Physically abused: Not on file     Forced sexual activity: Not on file   Other Topics Concern    Not on file   Social History Narrative    Drinks coffee    Second Marriage    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       Current Outpatient Medications:     multivitamin (THERAGRAN) TABS, Take 1 tablet by mouth daily, Disp: , Rfl:   Allergies   Allergen Reactions    Shellfish-Derived Products Swelling     shrimp       Physical Exam:     Vitals:    07/29/19 1018   BP: 110/70   Pulse: 71   Resp: 16   Temp: 98 3 °F (36 8 °C)     Physical Exam   Constitutional: She is oriented to person, place, and time  She appears well-developed and well-nourished  HENT:   Head: Normocephalic and atraumatic  Mouth/Throat: Oropharynx is clear and moist    Eyes: Pupils are equal, round, and reactive to light  EOM are normal    Neck: Normal range of motion  Neck supple  No JVD present  No tracheal deviation present  No thyromegaly present  Cardiovascular: Normal rate, regular rhythm, normal heart sounds and intact distal pulses  Exam reveals no gallop and no friction rub  No murmur heard  Pulmonary/Chest: Effort normal and breath sounds normal  No respiratory distress  She has no wheezes  She has no rales  The right breast has post radiation changes which are resolving  There are no dominant masses or axillary adenopathy  Examination of the left breast demonstrates no skin changes nipple discharge dominant masses or axillary adenopathy  Abdominal: Soft  She exhibits no distension and no mass  There is no hepatomegaly  There is no tenderness  There is no rebound and no guarding  Musculoskeletal: Normal range of motion  She exhibits no edema or tenderness  Lymphadenopathy:     She has no cervical adenopathy  Neurological: She is alert and oriented to person, place, and time  No cranial nerve deficit  Skin: Skin is warm and dry  No rash noted  No erythema  Psychiatric: She has a normal mood and affect  Her behavior is normal    Vitals reviewed  Results:     No current imaging          Advance Care Planning/Advance Directives:  I discussed the disease status, treatment plans and follow-up with the patient

## 2019-07-29 NOTE — PROGRESS NOTES
Follow-up - Radiation Oncology   Clemonsgopal Newkirk 1975 38 y o  female [id number]      History of Present Illness   Cancer Staging  Ductal carcinoma in situ (DCIS) of right breast  Staging form: Breast, AJCC 8th Edition  - Pathologic: Stage Unknown (pTis (DCIS), pNX, cM0, G3, ER+, CA+, HER2: Not Assessed) - Unsigned  Neoadjuvant therapy: No  Laterality: Right  Tumor size (mm): 17  Histologic grading system: 3 grade system      Michelle Grossmanchester is a 38y o  year old female who returns for first follow-up s/p completion of radiation to the right breast on 6/11/19      38year old premenopausal female with a newly diagnosed right breast ductal carcinoma in situ, grade 3 that was ER positive at 90 percent and CA positive at 20 percent   She is s/p lumpectomy with negative margins on April 2, 2019  We recommended radiation therapy to the entire right breast  She elected not to take Tamoxifen  Interval History:   She tolerated radiation well with mild to moderate skin erythema applying remedy lotion daily  She did call the office one week post treatment with concerned about delayed skin reaction with peeling, pink and raw as well as shooting pains  She was assured this is all normal and will take some time to recover  She was applying cortisone cream for itching  She reports she is feeling well and has a normal energy level    She denies any pain or itching in the treated breast   She stopped using cortisone cream   She has been applying remedy cream along with daily breast massage       7/29/19 Surg Onc follow-up with Dr Mario Lilly      Ductal carcinoma in situ (DCIS) of right breast    2/25/2019 Biopsy     Right breast stereotatic biopsy  11 o'clock, anterior 3rd of the breast  Ductal carcinoma in situ  Grade 2  ER 90  CA 20    Concordant      3/14/2019 Genetic Testing     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  Variant of uncertain significance identified in PTEN  [facility]      2019 Surgery     Right lumpectomy  Ductal carcinoma in situ  1 7 cm  Grade 3  Margins negative  Stage 0      2019 - 2019 Hormone Therapy     Declined hormone therapy  Consult with Cortez Valdes      2019 - 2019 Radiation     Dr Ethel Meyer  Course: C1  Right breast, 16 fractions, total dose: 4256 cGy  Right breast cone down, 5 fractions, total dose: 1000 cGy         Past Medical History:   Diagnosis Date    BRCA negative 2019    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; [employer]    Female infertility     Recurrent pregnancy loss, antepartum condition or complication     x 4     Past Surgical History:   Procedure Laterality Date    BREAST LUMPECTOMY Right 2019    Procedure: BREAST LUMPECTOMY; BREAST NEEDLE LOCALIZATION (NEEDLE LOC AT 0800); Surgeon: Caterina Marie MD;  [facility];  Service: Surgical Oncology     SECTION      INDUCED       x3    MAMMO NEEDLE LOCALIZATION RIGHT (ALL INC) Right 2019    MAMMO STEREOTACTIC BREAST BIOPSY RIGHT (ALL INC) Right 2019       Social History   Social History     Substance and Sexual Activity   Alcohol Use Yes    Comment: rarely     Social History     Substance and Sexual Activity   Drug Use No     Social History     Tobacco Use   Smoking Status Never Smoker   Smokeless Tobacco Never Used       Meds/Allergies     Current Outpatient Medications:     multivitamin (THERAGRAN) TABS, Take 1 tablet by mouth daily, Disp: , Rfl:   Allergies   Allergen Reactions    Shellfish-Derived Products Swelling     shrimp     Review of Systems   Constitutional: Negative  Fatigue improving since end of treatment   HENT: Negative  Eyes: Negative  Respiratory: Negative  Cardiovascular: Negative  Gastrointestinal: Negative  Endocrine: Negative  Genitourinary: Negative  Musculoskeletal: Negative  Skin: Negative  Skin hyperpigmented, healing from RT   Occasional twinges of pain in right breast Allergic/Immunologic: Negative  Neurological: Negative  Hematological: Negative  Psychiatric/Behavioral: Negative           OBJECTIVE:   /70 (BP Location: Left arm)   Pulse 73   Temp (!) 97 1 °F (36 2 °C) (Temporal)   Resp 16   Wt 54 9 kg (121 lb)   SpO2 98%   BMI 22 86 kg/m²   Pain Assessment:  0  ECOG/Zubrod/WHO: 0 - Asymptomatic    Physical Exam   Constitutional: She is oriented to person, place, and time  She appears well-developed and well-nourished  No distress  HENT:   Head: Normocephalic and atraumatic  Mouth/Throat: No oropharyngeal exudate  Eyes: Pupils are equal, round, and reactive to light  Conjunctivae and EOM are normal  No scleral icterus  Neck: Normal range of motion  Neck supple  No tracheal deviation present  No thyromegaly present  Cardiovascular: Normal rate, regular rhythm and normal heart sounds  Pulmonary/Chest: Effort normal and breath sounds normal  No respiratory distress  She has no wheezes  She has no rales  She exhibits no tenderness  Right breast exhibits no inverted nipple, no mass, no nipple discharge, no skin change ( She has a well-healed right breast superior circumareolar incision with no underlying masses  There is some skin hyperpigmentation and post radiation changes ) and no tenderness  Left breast exhibits no inverted nipple, no mass, no nipple discharge, no skin change and no tenderness  Abdominal: Soft  Bowel sounds are normal  She exhibits no distension and no mass  There is no tenderness  Musculoskeletal: Normal range of motion  She exhibits no edema or tenderness  Lymphadenopathy:     She has no cervical adenopathy  She has no axillary adenopathy  Right: No supraclavicular adenopathy present  Left: No supraclavicular adenopathy present  Neurological: She is alert and oriented to person, place, and time  No cranial nerve deficit  Coordination normal    Skin: Skin is warm and dry  No rash noted   She is not diaphoretic  No erythema  No pallor  Psychiatric: She has a normal mood and affect  Her behavior is normal  Judgment and thought content normal    Nursing note and vitals reviewed      RESULTS    Lab Results: No results found for this or any previous visit (from the past 672 hour(s))  Imaging Studies:No results found  Assessment/Plan:  No orders of the defined types were placed in this encounter  Aki Chua is a 38y o  year old female with right breast ductal carcinoma in situ, grade 3 that was ER positive at 90 percent and [place] positive at 20 percent   She is s/p lumpectomy with negative margins on April 2, 2019  We recommended radiation therapy to the entire right breast  She elected not to take Tamoxifen  She completed radiation therapy on June 11, 2019 and returns today for follow-up visit  She has recovered well from radiation therapy  Clinical examination today reveals some right breast post treatment changes without any masses  She will continue to massage the breast daily and apply Remedy cream   She will see Dr Denis Carlisle later this morning  She will return here for follow-up in 6 months  Cesar Carlin MD  7/29/2019,8:22 AM    Portions of the record may have been created with voice recognition software   Occasional wrong word or "sound a like" substitutions may have occurred due to the inherent limitations of voice recognition software   Read the chart carefully and recognize, using context, where substitutions have occurred

## 2019-12-12 ENCOUNTER — TELEPHONE (OUTPATIENT)
Dept: SURGICAL ONCOLOGY | Facility: CLINIC | Age: 44
End: 2019-12-12

## 2019-12-12 NOTE — TELEPHONE ENCOUNTER
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

## 2020-01-13 ENCOUNTER — TELEPHONE (OUTPATIENT)
Dept: INTERNAL MEDICINE CLINIC | Facility: CLINIC | Age: 45
End: 2020-01-13

## 2020-01-13 DIAGNOSIS — Z00.00 HEALTH MAINTENANCE EXAMINATION: Primary | ICD-10-CM

## 2020-01-13 NOTE — TELEPHONE ENCOUNTER
Patient notified  Patient states she cant remember if she had the MMR, Varicella (not in records) vaccines done before when she was a kid  Patient would like to know if blood work will be ordered for all or if she should come in to get vaccines administered for it? If needs vaccines, will call back to schedule NV  Pt states can leave detailed message if she doesn't

## 2020-01-13 NOTE — TELEPHONE ENCOUNTER
Patient is going to school and they are requiring her to have these vaccines done?  OKAY to schedule or should she have titers done first?    MMR  varicella  quantiferon gold

## 2020-01-17 LAB
GAMMA INTERFERON BACKGROUND BLD IA-ACNC: 0.02 IU/ML
HBV SURFACE AB SER-ACNC: 47 MIU/ML
M TB IFN-G CD4+ T-CELLS BLD-ACNC: 0.02 IU/ML
M TB IFN-G CD4+ T-CELLS BLD-ACNC: 0.02 IU/ML
MEV IGG SER IA-ACNC: >300 AU/ML
MITOGEN IGNF BLD-ACNC: 6.87 IU/ML
MUV IGG SER IA-ACNC: 84.1 AU/ML
QUANTIFERON INCUBATION COMMENT: NORMAL
QUANTIFERON-TB GOLD PLUS: NEGATIVE
RUBV IGG SERPL IA-ACNC: 2.68 INDEX
SERVICE CMNT-IMP: NORMAL
VZV IGG SER IA-ACNC: >4000 INDEX

## 2020-01-20 ENCOUNTER — TELEPHONE (OUTPATIENT)
Dept: INTERNAL MEDICINE CLINIC | Facility: CLINIC | Age: 45
End: 2020-01-20

## 2020-01-23 PROBLEM — Z86.000 HISTORY OF DUCTAL CARCINOMA IN SITU (DCIS) OF BREAST: Status: ACTIVE | Noted: 2019-03-05

## 2020-01-27 ENCOUNTER — CLINICAL SUPPORT (OUTPATIENT)
Dept: RADIATION ONCOLOGY | Facility: HOSPITAL | Age: 45
End: 2020-01-27
Attending: RADIOLOGY
Payer: COMMERCIAL

## 2020-01-27 ENCOUNTER — OFFICE VISIT (OUTPATIENT)
Dept: SURGICAL ONCOLOGY | Facility: CLINIC | Age: 45
End: 2020-01-27
Payer: COMMERCIAL

## 2020-01-27 VITALS
HEART RATE: 80 BPM | OXYGEN SATURATION: 99 % | HEIGHT: 61 IN | DIASTOLIC BLOOD PRESSURE: 70 MMHG | TEMPERATURE: 98.1 F | WEIGHT: 124.8 LBS | BODY MASS INDEX: 23.56 KG/M2 | RESPIRATION RATE: 16 BRPM | SYSTOLIC BLOOD PRESSURE: 110 MMHG

## 2020-01-27 VITALS
WEIGHT: 124 LBS | HEART RATE: 80 BPM | RESPIRATION RATE: 14 BRPM | BODY MASS INDEX: 23.41 KG/M2 | DIASTOLIC BLOOD PRESSURE: 70 MMHG | TEMPERATURE: 98.1 F | SYSTOLIC BLOOD PRESSURE: 110 MMHG | HEIGHT: 61 IN

## 2020-01-27 DIAGNOSIS — Z15.09 MONOALLELIC MUTATION OF ATM GENE: ICD-10-CM

## 2020-01-27 DIAGNOSIS — Z86.000 HISTORY OF DUCTAL CARCINOMA IN SITU (DCIS) OF BREAST: Primary | ICD-10-CM

## 2020-01-27 DIAGNOSIS — Z15.01 MONOALLELIC MUTATION OF ATM GENE: ICD-10-CM

## 2020-01-27 DIAGNOSIS — Z15.89 MONOALLELIC MUTATION OF ATM GENE: ICD-10-CM

## 2020-01-27 DIAGNOSIS — Z85.3 ENCOUNTER FOR FOLLOW-UP SURVEILLANCE OF BREAST CANCER: ICD-10-CM

## 2020-01-27 DIAGNOSIS — Z08 ENCOUNTER FOR FOLLOW-UP SURVEILLANCE OF BREAST CANCER: ICD-10-CM

## 2020-01-27 PROCEDURE — 99213 OFFICE O/P EST LOW 20 MIN: CPT | Performed by: SURGERY

## 2020-01-27 PROCEDURE — G0463 HOSPITAL OUTPT CLINIC VISIT: HCPCS | Performed by: RADIOLOGY

## 2020-01-27 PROCEDURE — 99211 OFF/OP EST MAY X REQ PHY/QHP: CPT | Performed by: RADIOLOGY

## 2020-01-27 NOTE — PROGRESS NOTES
Rae Kayode 1975 is a 38 y o  female       Follow up visit     Patient is a 38 y o female with a h/o right breast ductal carcinoma s/p lumpectomy and radiation which completed 6/11/19  She was last seen by radiation oncology 7/29/19  She returns today for follow-up  7/29/19 Dr Isai Hernandez- she declined hormonal therapy  Clinical exam shows no evidence of local regional or distant recurrence disease  Recommend annual mammogram  Recommend speaking with gyno 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 to see if there is any increase surveillance that might be warranted  F/u in 6 months    1/27/20 Dr Isai Hernandez- she complains for some discomfort over along the axillary midline beneath the axillary region, this is outside the breast and axilla  I do not appreciate any worrisome findings in this area   The remainder of her exam is normal  F/u in 6 months      2/21/20 Mammogram  8/4/2020-f/u Dr Isai Hernandez             History of ductal carcinoma in situ (DCIS) of breast    2/25/2019 Biopsy     Right breast stereotatic biopsy  11 o'clock, anterior 3rd of the breast  Ductal carcinoma in situ  Grade 2  ER 90  DE 20    Concordant      3/14/2019 Genetic Testing     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  [facility]      4/2/2019 Surgery     Right lumpectomy  Ductal carcinoma in situ  1 7 cm  Grade 3  Margins negative  Stage 0      4/16/2019 - 4/16/2019 Hormone Therapy     Declined hormone therapy  Consult with Jaspal Carrillo      5/13/2019 - 6/11/2019 Radiation     Dr Stephanie Condon  Course: C1  Right breast, 16 fractions, total dose: 4256 cGy  Right breast cone down, 5 fractions, total dose: 1000 cGy         Clinical Trial: no      Health Maintenance   Topic Date Due    DTaP,Tdap,and Td Vaccines (1 - Tdap) 05/09/1985    HIV Screening  05/09/1989    Influenza Vaccine  07/01/2019    MAMMOGRAM  02/20/2020    Depression Screening PHQ  04/23/2020    Annual Physical  06/11/2020    BMI: Adult  01/27/2021    Cervical Cancer Screening  01/30/2024    Pneumococcal Vaccine: 65+ Years (1 of 2 - PCV13) 2039    Pneumococcal Vaccine: Pediatrics (0 to 5 Years) and At-Risk Patients (6 to 59 Years)  Aged Out    HIB Vaccine  Aged Out    Hepatitis B Vaccine  Aged Out    IPV Vaccine  Aged Out    Hepatitis A Vaccine  Aged Out    Meningococcal ACWY Vaccine  Aged Out    HPV Vaccine  Aged Out       Patient Active Problem List   Diagnosis    History of ductal carcinoma in situ (DCIS) of breast    Monoallelic mutation of JS gene    Vitamin D deficiency    Other constipation     Past Medical History:   Diagnosis Date    BRCA negative 2019    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; [place] Stable Female infertility     History of radiation therapy 2019    Right WBRT    Recurrent pregnancy loss, antepartum condition or complication     x 4     Past Surgical History:   Procedure Laterality Date    BREAST LUMPECTOMY Right 2019    Procedure: BREAST LUMPECTOMY; BREAST NEEDLE LOCALIZATION (NEEDLE LOC AT 0800);   Surgeon: Ellis Duncan MD;  [facility];  Service: Surgical Oncology     SECTION      INDUCED       x3    MAMMO NEEDLE LOCALIZATION RIGHT (ALL INC) Right 2019    MAMMO STEREOTACTIC BREAST BIOPSY RIGHT (ALL INC) Right 2019     Family History   Problem Relation Age of Onset    No Known Problems Mother     No Known Problems Father     No Known Problems Sister     Diabetes Brother     Hypertension Brother     No Known Problems Son     Breast cancer Maternal Grandmother 39    Diabetes Maternal Grandmother     Heart disease Maternal Grandfather     Diabetes Maternal Grandfather     Breast cancer Paternal Grandmother 39    Diabetes Paternal Grandfather     Heart disease Paternal Grandfather     Hypertension Paternal Grandfather     No Known Problems Sister     Heart attack Maternal Aunt      Social History     Socioeconomic History    Marital status: /Civil Union     Spouse name: Not on file    Number of children: Not on file    Years of education: Not on file    Highest education level: Not on file   Occupational History    Not on file   Social Needs    Financial resource strain: Not on file    Food insecurity:     Worry: Not on file     Inability: Not on file    Transportation needs:     Medical: Not on file     Non-medical: Not on file   Tobacco Use    Smoking status: Never Smoker    Smokeless tobacco: Never Used   Substance and Sexual Activity    Alcohol use: Yes     Comment: rarely    Drug use: No    Sexual activity: Yes     Partners: Male     Birth control/protection: None   Lifestyle    Physical activity:     Days per week: Not on file     Minutes per session: Not on file    Stress: Not on file   Relationships    Social connections:     Talks on phone: Not on file     Gets together: Not on file     Attends Mor[employer] service: Not on file     Active member of club or organization: Not on file     Attends meetings of clubs or organizations: Not on file     Relationship status: Not on file    Intimate partner violence:     Fear of current or ex partner: Not on file     Emotionally abused: Not on file     Physically abused: Not on file     Forced sexual activity: Not on file   Other Topics Concern    Not on file   Social History Narrative    Drinks coffee    Second Marriage    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       Current Outpatient Medications:     multivitamin (THERAGRAN) TABS, Take 1 tablet by mouth daily, Disp: , Rfl:   Allergies   Allergen Reactions    Shellfish-Derived Products Swelling     shrimp       Review of Systems:  Review of Systems   Constitutional: Negative for appetite change, chills and fever  Virus at beginning of Jan   HENT: Negative  Eyes: Negative  Respiratory: Negative  Negative for cough and shortness of breath  Cardiovascular: Negative  Negative for chest pain  Gastrointestinal: Negative  Endocrine: Negative  Genitourinary: Negative      Musculoskeletal: Occas pain in R axilla region    Skin: Negative  Allergic/Immunologic: Negative  Neurological: Negative  Negative for headaches  Hematological: Negative  Psychiatric/Behavioral: Negative  Vitals:    01/27/20 1103   BP: 110/70   Pulse: 80   Resp: 16   Temp: 98 1 °F (36 7 °C)   TempSrc: Temporal   SpO2: 99%   Weight: 56 6 kg (124 lb 12 8 oz)   Height: 5' 1" (1 549 m)               Imaging:No results found      Teaching

## 2020-01-27 NOTE — PROGRESS NOTES
Surgical Oncology Follow Up       [address] [facility],[address]  [facility] [place]  [address]  [place] [address]    [facility]  1975  [id number]  [address]  [facility] [place]  [address] [facility] [phone]    Chief Complaint   Patient presents with    Follow-up     Pt is here for a six month follow up          Assessment & Plan:   Patient presents for a follow-up visit  Her mammograms are scheduled for next February  She complains of some discomfort over along the axillary midline beneath the axillary region, this is outside the breast and axilla  I do not appreciate any worrisome findings in this area  The remainder of her breast exam is entirely normal/consistent with her surgery  She is free of any clinical evidence of local regional distant recurrence disease  We will see her back in 6 months  She is agreeable to see our advanced practitioner at that time  Cancer History:     Oncology History    Patient is a 38 y o female with a h/o right breast ductal carcinoma s/p lumpectomy and radiation which completed 6/11/19  She was last seen by radiation oncology 7/29/19  She returns today for follow-up  7/29/19 Dr Marbella Abdi- she declined hormonal therapy  Clinical exam shows no evidence of local regional or distant recurrence disease  Recommend annual mammogram  Recommend speaking with gyno 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 to see if there is any increase surveillance that might be warranted   F/u in 6 months    1/27/20 Dr Marbella Abdi  2/21/20 Mammogram        History of ductal carcinoma in situ (DCIS) of breast    2/25/2019 Biopsy     Right breast stereotatic biopsy  11 o'clock, anterior 3rd of the breast  Ductal carcinoma in situ  Grade 2  ER 90  DE 20    Concordant      3/14/2019 Genetic Testing     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  [facility]      4/2/2019 Surgery     Right lumpectomy  Ductal carcinoma in situ  1 7 cm  Grade 3  Margins negative  Stage 0      2019 - 2019 Hormone Therapy     Declined hormone therapy  Consult with Roldan Steve      2019 - 2019 Radiation     Dr Margo Maldonado  Course: C1  Right breast, 16 fractions, total dose: 4256 cGy  Right breast cone down, 5 fractions, total dose: 1000 cGy           Interval History:   See Assessment & Plan    Review of Systems:   Review of Systems   Constitutional: Negative for activity change, appetite change and fatigue  HENT: Negative  Eyes: Negative  Respiratory: Negative for cough, shortness of breath and wheezing  Cardiovascular: Negative for chest pain and leg swelling  Gastrointestinal: Negative  Endocrine: Negative  Genitourinary: Negative  Musculoskeletal:        No new changes or complaints of bone pain   Skin: Negative  Allergic/Immunologic: Negative  Neurological: Negative  Hematological: Negative  Psychiatric/Behavioral: Negative  Past Medical History     Patient Active Problem List   Diagnosis    History of ductal carcinoma in situ (DCIS) of breast    Monoallelic mutation of JS gene    Vitamin D deficiency    Other constipation     Past Medical History:   Diagnosis Date    BRCA negative 2019    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; [place] Female infertility     History of radiation therapy 2019    Right WBRT    Recurrent pregnancy loss, antepartum condition or complication     x 4     Past Surgical History:   Procedure Laterality Date    BREAST LUMPECTOMY Right 2019    Procedure: BREAST LUMPECTOMY; BREAST NEEDLE LOCALIZATION (NEEDLE LOC AT 0800);   Surgeon: Ham Oliveros MD;  [facility];  Service: Surgical Oncology     SECTION      INDUCED       x3    MAMMO NEEDLE LOCALIZATION RIGHT (ALL INC) Right 2019    MAMMO STEREOTACTIC BREAST BIOPSY RIGHT (ALL INC) Right 2019     Family History   Problem Relation Age of Onset    No Known Problems Mother     No Known Problems Father     No Known Problems Sister     Diabetes Brother     Hypertension Brother     No Known Problems Son     Breast cancer Maternal Grandmother 39    Diabetes Maternal Grandmother     Heart disease Maternal Grandfather     Diabetes Maternal Grandfather     Breast cancer Paternal Grandmother 39    Diabetes Paternal Grandfather     Heart disease Paternal Grandfather     Hypertension Paternal Grandfather     No Known Problems Sister     Heart attack Maternal Aunt      Social History     Socioeconomic History    Marital status: /Civil Union     Spouse name: Not on file    Number of children: Not on file    Years of education: Not on file    Highest education level: Not on file   Occupational History    Not on file   Social Needs    Financial resource strain: Not on file    Food insecurity:     Worry: Not on file     Inability: Not on file    Transportation needs:     Medical: Not on file     Non-medical: Not on file   Tobacco Use    Smoking status: Never Smoker    Smokeless tobacco: Never Used   Substance and Sexual Activity    Alcohol use: Yes     Comment: rarely    Drug use: No    Sexual activity: Yes     Partners: Male     Birth control/protection: None   Lifestyle    Physical activity:     Days per week: Not on file     Minutes per session: Not on file    Stress: Not on file   Relationships    Social connections:     Talks on phone: Not on file     Gets together: Not on file     Attends Quaker service: Not on file     Active member of club or organization: Not on file     Attends meetings of clubs or organizations: Not on file     Relationship status: Not on file    Intimate partner violence:     Fear of current or ex partner: Not on file     Emotionally abused: Not on file     Physically abused: Not on file     Forced sexual activity: Not on file   Other Topics Concern    Not on file   Social History Narrative    Drinks coffee    Second Marriage    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       Current Outpatient Medications:     multivitamin (THERAGRAN) TABS, Take 1 tablet by mouth daily, Disp: , Rfl:   Allergies   Allergen Reactions    Shellfish-Derived Products Swelling     shrimp       Physical Exam:     Vitals:    01/27/20 0948   BP: 110/70   Pulse: 80   Resp: 14   Temp: 98 1 °F (36 7 °C)     Physical Exam   Constitutional: She is oriented to person, place, and time  She appears well-developed and well-nourished  HENT:   Head: Normocephalic and atraumatic  Mouth/Throat: Oropharynx is clear and moist    Eyes: Pupils are equal, round, and reactive to light  EOM are normal    Neck: Normal range of motion  Neck supple  No JVD present  No tracheal deviation present  No thyromegaly present  Cardiovascular: Normal rate, regular rhythm, normal heart sounds and intact distal pulses  Exam reveals no gallop and no friction rub  No murmur heard  Pulmonary/Chest: Effort normal and breath sounds normal  No respiratory distress  She has no wheezes  She has no rales  Both breasts were examined in the sitting and supine position  There are no worrisome skin lesions, no nipple retraction and no nipple discharge  There are no dominant masses, axillary adenopathy or supraclavicular adenopathy on either side  Abdominal: Soft  She exhibits no distension and no mass  There is no hepatomegaly  There is no tenderness  There is no rebound and no guarding  Musculoskeletal: Normal range of motion  She exhibits no edema or tenderness  Lymphadenopathy:     She has no cervical adenopathy  Neurological: She is alert and oriented to person, place, and time  No cranial nerve deficit  Skin: Skin is warm and dry  No rash noted  No erythema  Psychiatric: She has a normal mood and affect  Her behavior is normal    Vitals reviewed  Results & Discussion:   Patient is free of any clinical evidence of local regional or distant recurrence disease    I have recommended the patient follow up with our advanced practitioner  I have recommended in the interval however that she has a low threshold to contact us should she appreciate any changes in her breast           Advance Care Planning/Advance Directives:  I discussed the disease status, treatment plans and follow-up with the patient

## 2020-01-27 NOTE — PROGRESS NOTES
Follow-up - Radiation Oncology   Dieudonne Garcia 1975 38 y o  female [id number]      History of Present Illness   Cancer Staging  History of ductal carcinoma in situ (DCIS) of breast  Staging form: Breast, AJCC 8th Edition  - Pathologic: Stage Unknown (pTis (DCIS), pNX, cM0, G3, ER+, ND+, HER2: Not Assessed) - Unsigned  Neoadjuvant therapy: No  Laterality: Right  Tumor size (mm): 17  Histologic grading system: 3 grade system      Dieudonne Garcia is a 38y o  year old female with a history of a right breast ductal carcinoma s/p lumpectomy and radiation which completed 6/11/19  She was last seen by radiation oncology 7/29/19  She returns today for follow-up      Interval History:  7/29/19 Dr Wil Lundy- she declined hormonal therapy  Clinical exam shows no evidence of local regional or distant recurrence disease  Recommend annual mammogram  Recommend speaking with gyno 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 to see if there is any increase surveillance that might be warranted  F/u in 6 months     1/27/20 Dr Wil Lundy- she complains for some discomfort over along the axillary midline beneath the axillary region, this is outside the breast and axilla  I do not appreciate any worrisome findings in this area  The remainder of her exam is normal  F/u in 6 months    She reports she is feeling well  She has recovered from a viral illness at the beginning of this month  She has occasional discomfort in the right axillary region  She denies any breast masses bilaterally  She has been applying Remedy cream daily along with breast massage  She denies any vaginal bleeding or discharge    She is due for annual gyn examination on March 31, 2020 2/21/20 Mammogram  8/4/2020-f/u Dr Wil Lundy     Historical Information      History of ductal carcinoma in situ (DCIS) of breast    2/25/2019 Biopsy     Right breast stereotatic biopsy  11 o'clock, anterior 3rd of the breast  Ductal carcinoma in situ  Grade 2  ER 90  ND 20    Concordant 3/14/2019 Genetic Testing     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  [facility]      2019 Surgery     Right lumpectomy  Ductal carcinoma in situ  1 7 cm  Grade 3  Margins negative  Stage 0      2019 - 2019 Hormone Therapy     Declined hormone therapy  Consult with Geoff Russell      2019 - 2019 Radiation     Dr Manav Richmond  Course: C1  Right breast, 16 fractions, total dose: 4256 cGy  Right breast cone down, 5 fractions, total dose: 1000 cGy         Past Medical History:   Diagnosis Date    BRCA negative 2019    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; [place] Female infertility     History of radiation therapy 2019    Right WBRT    Recurrent pregnancy loss, antepartum condition or complication     x 4     Past Surgical History:   Procedure Laterality Date    BREAST LUMPECTOMY Right 2019    Procedure: BREAST LUMPECTOMY; BREAST NEEDLE LOCALIZATION (NEEDLE LOC AT 0800); Surgeon: Dianne Hendrickson MD;  [facility];  Service: Surgical Oncology     SECTION      INDUCED       x3    MAMMO NEEDLE LOCALIZATION RIGHT (ALL INC) Right 2019    MAMMO STEREOTACTIC BREAST BIOPSY RIGHT (ALL INC) Right 2019       Social History   Social History     Substance and Sexual Activity   Alcohol Use Yes    Comment: rarely     Social History     Substance and Sexual Activity   Drug Use No     Social History     Tobacco Use   Smoking Status Never Smoker   Smokeless Tobacco Never Used     Meds/Allergies     Current Outpatient Medications:     multivitamin (THERAGRAN) TABS, Take 1 tablet by mouth daily, Disp: , Rfl:   Allergies   Allergen Reactions    Shellfish-Derived Products Swelling     shrimp     Review of Systems   Constitutional: Negative for appetite change, chills and fever  Virus at beginning of    HENT: Negative  Eyes: Negative  Respiratory: Negative  Negative for cough and shortness of breath  Cardiovascular: Negative  Negative for chest pain  Gastrointestinal: Negative  Endocrine: Negative  Genitourinary: Negative  Musculoskeletal:        Occas pain in R axilla region    Skin: Negative  Allergic/Immunologic: Negative  Neurological: Negative  Negative for headaches  Hematological: Negative  Psychiatric/Behavioral: Negative  OBJECTIVE:   /70   Pulse 80   Temp 98 1 °F (36 7 °C) (Temporal)   Resp 16   Ht 5' 1" (1 549 m)   Wt 56 6 kg (124 lb 12 8 oz)   SpO2 99%   BMI 23 58 kg/m²   Pain Assessment:  0  ECOG/Zubrod/WHO: 0 - Asymptomatic    Physical Exam   Constitutional: She is oriented to person, place, and time  She appears well-developed and well-nourished  No distress  HENT:   Head: Normocephalic and atraumatic  Mouth/Throat: No oropharyngeal exudate  Eyes: Pupils are equal, round, and reactive to light  Conjunctivae and EOM are normal  No scleral icterus  Neck: Normal range of motion  Neck supple  No tracheal deviation present  No thyromegaly present  Cardiovascular: Normal rate, regular rhythm and normal heart sounds  Pulmonary/Chest: Effort normal and breath sounds normal  No respiratory distress  She has no wheezes  She has no rales  She exhibits no tenderness  Right breast exhibits no inverted nipple, no mass, no nipple discharge, no skin change ( She has a well-healed right breast superior circumareolar incision with no underlying masses  There is some mild skin hyperpigmentation and post radiation changes ) and no tenderness  Left breast exhibits no inverted nipple, no mass, no nipple discharge, no skin change and no tenderness  Abdominal: Soft  Bowel sounds are normal  She exhibits no distension and no mass  There is no tenderness  Musculoskeletal: Normal range of motion  She exhibits no edema or tenderness     Lymphadenopathy:     She has no cervical adenopathy      She has no axillary adenopathy         Right: No supraclavicular adenopathy present         Left: No supraclavicular adenopathy present  Neurological: She is alert and oriented to person, place, and time  No cranial nerve deficit  Coordination normal    Skin: Skin is warm and dry  No rash noted  She is not diaphoretic  No erythema  No pallor  Psychiatric: She has a normal mood and affect  Her behavior is normal  Judgment and thought content normal    Nursing note and vitals reviewed  RESULTS    Lab Results:   Recent Results (from the past 672 hour(s))   Mumps antibody, IgG    Collection Time: 01/14/20  6:49 AM   Result Value Ref Range    Mumps Ab, IgG 84 1 Immune >10 9 AU/mL   Rubella antibody, IgG    Collection Time: 01/14/20  6:49 AM   Result Value Ref Range    Rubella IgG Scr 2 68 Immune >0 99 index   Rubeola antibody IgG    Collection Time: 01/14/20  6:49 AM   Result Value Ref Range    Rubeola Ab, IgG >300 0 Immune >16 4 AU/mL   Varicella zoster antibody, IgG    Collection Time: 01/14/20  6:49 AM   Result Value Ref Range    Varicella Zoster, IgG >4000 Immune >165 index   Hepatitis B surface antibody    Collection Time: 01/14/20  6:49 AM   Result Value Ref Range    Hepatitis B Surface Ab Quant 47 0 Immunity>9 9 mIU/mL   Quantiferon TB ([facility])    Collection Time: 01/14/20  6:49 AM   Result Value Ref Range    Quantiferon Incubation Comment Incubation performed  Quantiferon-TB Gold Plus Negative Negative   Quantiferon TB Gold Plus ([facility])    Collection Time: 01/14/20  6:49 AM   Result Value Ref Range    QuantiFERON Criteria Comment     LC QFT TB1-NIL 0 02 IU/mL    LC QFT TB2-NIL 0 02 IU/mL    LC QFT NIL 0 02 IU/mL    LC QFT MITOGEN-NIL 6 87 IU/mL       Imaging Studies:No results found  Assessment/Plan:  No orders of the defined types were placed in this encounter  Storm Butt is a 38y o  year old female with a right breast ductal carcinoma in situ, grade 3 that was ER positive at 80 percent and MN positive at 20 percent   She is s/p lumpectomy with negative margins on April 2, 2019   We recommended radiation therapy to the entire right breast  She elected not to take Tamoxifen  She completed radiation therapy on June 11, 2019 and returns today for follow-up visit  She is doing well today  Clinical examination today reveals some right breast post treatment changes without any masses  She will continue to massage the breast daily and apply Remedy cream    She has mammogram scheduled for February 21, 2020  She will see Dr Santos Jha in 6 months  She will return here for follow-up in 6 months  Mer Trotter MD  1/27/2020,11:59 AM    Portions of the record may have been created with voice recognition software   Occasional wrong word or "sound a like" substitutions may have occurred due to the inherent limitations of voice recognition software   Read the chart carefully and recognize, using context, where substitutions have occurred

## 2020-02-21 ENCOUNTER — TRANSCRIBE ORDERS (OUTPATIENT)
Dept: ADMINISTRATIVE | Facility: HOSPITAL | Age: 45
End: 2020-02-21

## 2020-02-21 ENCOUNTER — HOSPITAL ENCOUNTER (OUTPATIENT)
Dept: RADIOLOGY | Facility: HOSPITAL | Age: 45
Discharge: HOME | End: 2020-02-21
Attending: SURGERY
Payer: COMMERCIAL

## 2020-02-21 VITALS — WEIGHT: 123 LBS | HEIGHT: 61 IN | BODY MASS INDEX: 23.22 KG/M2

## 2020-02-21 DIAGNOSIS — D05.11 DUCTAL CARCINOMA IN SITU (DCIS) OF RIGHT BREAST: ICD-10-CM

## 2020-02-21 PROCEDURE — 77066 DX MAMMO INCL CAD BI: CPT

## 2020-02-21 PROCEDURE — G0279 TOMOSYNTHESIS, MAMMO: HCPCS

## 2020-04-02 ENCOUNTER — TELEPHONE (OUTPATIENT)
Dept: INTERNAL MEDICINE CLINIC | Facility: CLINIC | Age: 45
End: 2020-04-02

## 2020-04-02 PROBLEM — G56.02 CARPAL TUNNEL SYNDROME ON LEFT: Status: ACTIVE | Noted: 2020-04-02

## 2020-08-03 ENCOUNTER — TELEPHONE (OUTPATIENT)
Dept: SURGICAL ONCOLOGY | Facility: CLINIC | Age: 45
End: 2020-08-03

## 2020-08-04 ENCOUNTER — OFFICE VISIT (OUTPATIENT)
Dept: SURGICAL ONCOLOGY | Facility: CLINIC | Age: 45
End: 2020-08-04
Payer: COMMERCIAL

## 2020-08-04 ENCOUNTER — RADIATION ONCOLOGY FOLLOW-UP (OUTPATIENT)
Dept: RADIATION ONCOLOGY | Facility: HOSPITAL | Age: 45
End: 2020-08-04
Attending: RADIOLOGY
Payer: COMMERCIAL

## 2020-08-04 VITALS
SYSTOLIC BLOOD PRESSURE: 120 MMHG | RESPIRATION RATE: 16 BRPM | WEIGHT: 128 LBS | BODY MASS INDEX: 24.17 KG/M2 | DIASTOLIC BLOOD PRESSURE: 80 MMHG | HEART RATE: 78 BPM | HEIGHT: 61 IN | TEMPERATURE: 97.9 F

## 2020-08-04 VITALS
BODY MASS INDEX: 23.96 KG/M2 | TEMPERATURE: 99 F | HEART RATE: 78 BPM | WEIGHT: 126.8 LBS | DIASTOLIC BLOOD PRESSURE: 70 MMHG | RESPIRATION RATE: 16 BRPM | SYSTOLIC BLOOD PRESSURE: 110 MMHG

## 2020-08-04 DIAGNOSIS — Z08 ENCOUNTER FOR FOLLOW-UP EXAMINATION AFTER COMPLETED TREATMENT FOR MALIGNANT NEOPLASM: Primary | ICD-10-CM

## 2020-08-04 DIAGNOSIS — Z15.01 MONOALLELIC MUTATION OF ATM GENE: ICD-10-CM

## 2020-08-04 DIAGNOSIS — Z86.000 HISTORY OF DUCTAL CARCINOMA IN SITU (DCIS) OF BREAST: ICD-10-CM

## 2020-08-04 DIAGNOSIS — Z86.000 HISTORY OF DUCTAL CARCINOMA IN SITU (DCIS) OF BREAST: Primary | ICD-10-CM

## 2020-08-04 DIAGNOSIS — Z15.89 MONOALLELIC MUTATION OF ATM GENE: ICD-10-CM

## 2020-08-04 DIAGNOSIS — R92.2 DENSE BREASTS: ICD-10-CM

## 2020-08-04 DIAGNOSIS — Z15.09 MONOALLELIC MUTATION OF ATM GENE: ICD-10-CM

## 2020-08-04 PROCEDURE — 99211 OFF/OP EST MAY X REQ PHY/QHP: CPT | Performed by: RADIOLOGY

## 2020-08-04 PROCEDURE — 3008F BODY MASS INDEX DOCD: CPT | Performed by: NURSE PRACTITIONER

## 2020-08-04 PROCEDURE — 1036F TOBACCO NON-USER: CPT | Performed by: NURSE PRACTITIONER

## 2020-08-04 PROCEDURE — 99214 OFFICE O/P EST MOD 30 MIN: CPT | Performed by: NURSE PRACTITIONER

## 2020-08-04 PROCEDURE — G0463 HOSPITAL OUTPT CLINIC VISIT: HCPCS | Performed by: RADIOLOGY

## 2020-08-04 NOTE — PROGRESS NOTES
Follow-up - Radiation Oncology   Willis Ventura 1975 54 y o  female [id number]      History of Present Illness   Cancer Staging  History of ductal carcinoma in situ (DCIS) of breast  Staging form: Breast, AJCC 8th Edition  - Pathologic: Stage Unknown (pTis (DCIS), pNX, cM0, G3, ER+, PA+, HER2: Not Assessed) - Unsigned  Neoadjuvant therapy: No  Laterality: Right  Tumor size (mm): 17  Histologic grading system: 3 grade system      Willis Ventura is a 54y o  year old female with a history of right breast ductal carcinoma s/p lumpectomy and radiation which completed 19  She was last seen by radiation oncology 20  She is seen today for routine follow-up  Interval History:   20 3D diagnostic bilateral mammogram  IMPRESSION:   No suspicious findings      ASSESSMENT/BI-RADS CATEGORY:  Overall: 2 - Benign     RECOMMENDATION:       - Diagnostic mammogram in 1 year of the breast(s)      She reports she is feeling well  She denies any breast masses bilaterally  She denies any pain in the bilateral breast regions  She continues to apply moisturizer and massage the treated right breast on a daily basis  She has been working from home now due to the coronavirus outbreak  She has no gyn complaints  She has her annual gyn examination scheduled for 2020   Her father  in  but her mother is alive and well and without any evidence of breast cancer      20 Surgical Onc FU  16 Dr Alida [place] exam    Historical Information   Oncology History   History of ductal carcinoma in situ (DCIS) of breast   2019 Biopsy    Right breast stereotatic biopsy  11 o'clock, anterior 3rd of the breast  Ductal carcinoma in situ  Grade 2  ER 90  PA 20    [facility]     3/14/2019 Genetic Testing    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  [facility]     2019 Surgery    Right lumpectomy  Ductal carcinoma in situ  1 7 cm  Grade 3  Margins negative  Stage 0 2019 - 2019 Hormone Therapy    Declined hormone therapy  Consult with Julian Rowland     2019 - 2019 Radiation    Dr Giron Section  Course: C1  Right breast, 16 fractions, total dose: 4256 cGy  Right breast cone down, 5 fractions, total dose: 1000 cGy         Past Medical History:   Diagnosis Date    BRCA negative 2019    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; [facility]    Breast cancer ([address] ) 2019    right breast    Female infertility     Fibrocystic breast     History of radiation therapy 2019    Right WBRT    Recurrent pregnancy loss, antepartum condition or complication     x 4     Past Surgical History:   Procedure Laterality Date    BREAST BIOPSY      BREAST LUMPECTOMY Right 2019    Procedure: BREAST LUMPECTOMY; BREAST NEEDLE LOCALIZATION (NEEDLE LOC AT 0800); Surgeon: Naun Bedoya MD;  [facility];  Service: Surgical Oncology     SECTION      INDUCED       x3    MAMMO NEEDLE LOCALIZATION RIGHT (ALL INC) Right 2019    MAMMO STEREOTACTIC BREAST BIOPSY RIGHT (ALL INC) Right 2019       Social History   Social History     Substance and Sexual Activity   Alcohol Use Yes    Comment: rarely     Social History     Substance and Sexual Activity   Drug Use No     Social History     Tobacco Use   Smoking Status Never Smoker   Smokeless Tobacco Never Used     Meds/Allergies     Current Outpatient Medications:     multivitamin (THERAGRAN) TABS, Take 1 tablet by mouth daily, Disp: , Rfl:   Allergies   Allergen Reactions    Shellfish-Derived Products Swelling     shrimp     Review of Systems   Constitutional: Negative  HENT: Negative  Eyes: Negative  Respiratory: Negative  Cardiovascular: Negative  Gastrointestinal: Negative  Endocrine: Negative  Genitourinary: Negative  Skin:        Occasional sharp pain at rt site  Skin slightly darker  Allergic/Immunologic: Negative  Neurological: Negative  Hematological: Negative  Psychiatric/Behavioral: Negative        OBJECTIVE:   /70   Pulse 78   Temp 99 °F (37 2 °C)   Resp 16   Wt 57 5 kg (126 lb 12 8 oz)   BMI 23 96 kg/m²   Pain Assessment:  0  ECOG/Zubrod/WHO: 0 - Asymptomatic    Physical Exam   Constitutional: She is oriented to person, place, and time  She appears well-developed and well-nourished  No distress  HENT:   Head: Normocephalic and atraumatic  Mouth/Throat: No oropharyngeal exudate  Eyes: Pupils are equal, round, and reactive to light  Conjunctivae and EOM are normal  No scleral icterus  Neck: Normal range of motion  Neck supple  No tracheal deviation present  No thyromegaly present  Cardiovascular: Normal rate, regular rhythm and normal heart sounds  Pulmonary/Chest: Effort normal and breath sounds normal  No respiratory distress  She has no wheezes  She has no rales  She exhibits no tenderness  Right breast exhibits no inverted nipple, no mass, no nipple discharge, no skin change ( She has a well-healed right breast superior circumareolar incision with no underlying masses   There is some mild skin hyperpigmentation and post radiation changes ) and no tenderness  Left breast exhibits no inverted nipple, no mass, no nipple discharge, no skin change and no tenderness  Abdominal: Soft  Bowel sounds are normal  She exhibits no distension and no mass  There is no tenderness  Musculoskeletal: Normal range of motion  She exhibits no edema or tenderness  Lymphadenopathy:     She has no cervical adenopathy      She has no axillary adenopathy         Right: No supraclavicular adenopathy present         Left: No supraclavicular adenopathy present  Neurological: She is alert and oriented to person, place, and time  No cranial nerve deficit  Coordination normal    Skin: Skin is warm and dry  No rash noted  She is not diaphoretic  No erythema  No pallor  Psychiatric: She has a normal mood and affect   Her behavior is normal  Judgment and thought content normal    Nursing note and vitals reviewed  RESULTS    Lab Results: No results found for this or any previous visit (from the past 672 hour(s))  Imaging Studies: See Above      Assessment/Plan:  No orders of the defined types were placed in this encounter  Luz Maria Gonsales is a 54y o  year old female with a right breast ductal carcinoma in situ, grade 3 that was ER positive at 80 percent and AK positive at 20 percent   She is s/p lumpectomy with negative margins on April 2, 2019  We recommended radiation therapy to the entire right breast  She elected not to take Tamoxifen   She completed radiation therapy on June 11, 2019 and returns today for follow-up visit  Mile Lockwood is doing well today  Clinical examination today reveals minimal right breast post treatment changes without any masses  Mile Lockwood will continue to massage the breast daily and apply Remedy cream    She had a negative mammogram  On February 21, 2020  She will see surgical oncology/Dr Scooby Zhang today and in 6 months  Mile Lockwood will return here for follow-up in 12 months        Geoff Moran MD  8/4/2020,3:24 PM    Portions of the record may have been created with voice recognition software   Occasional wrong word or "sound a like" substitutions may have occurred due to the inherent limitations of voice recognition software   Read the chart carefully and recognize, using context, where substitutions have occurred

## 2020-08-04 NOTE — PROGRESS NOTES
Blaine Alarcon 1975 is a 54 y o  female     Patient is a 54 y o  female with a h/o right breast ductal carcinoma s/p lumpectomy and radiation which completed 6/11/19  She was last seen by radiation oncology 1/27/20  She is seen today for routine follow-up     2/21/20 3D diagnostic bilateral mammogram  IMPRESSION:   No suspicious findings  ASSESSMENT/BI-RADS CATEGORY:  Overall: 2 - Benign     RECOMMENDATION:       - Diagnostic mammogram in 1 year of the breast(s)  8/4/20 Surgical Onc FU  8/5/20 [facility]            Oncology History   Patient is a 54 y o  female with a h/o right breast ductal carcinoma s/p lumpectomy and radiation which completed 6/11/19  She was last seen by radiation oncology 1/27/20  She is seen today for routine follow-up     2/21/20 3D diagnostic bilateral mammogram  IMPRESSION:   No suspicious findings      ASSESSMENT/BI-RADS CATEGORY:  Overall: 2 - Benign     RECOMMENDATION:       - Diagnostic mammogram in 1 year of the breast(s)          8/4/20 Surgical Onc FU  8/5/20 [facility] FU         History of ductal carcinoma in situ (DCIS) of breast   2/25/2019 Biopsy    Right breast stereotatic biopsy  11 o'clock, anterior 3rd of the breast  Ductal carcinoma in situ  Grade 2  ER 90  PA 20    [facility]     3/14/2019 Genetic Testing    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  [facility]     4/2/2019 Surgery    Right lumpectomy  Ductal carcinoma in situ  1 7 cm  Grade 3  Margins negative  Stage 0     4/16/2019 - 4/16/2019 Hormone Therapy    Declined hormone therapy  Consult with Jin Hastingsers     5/13/2019 - 6/11/2019 Radiation    Dr Juan Jose Hilliard  Course: C1  Right breast, 16 fractions, total dose: 4256 cGy  Right breast cone down, 5 fractions, total dose: 1000 cGy           Clinical Trial: no          Health Maintenance   Topic Date Due    HIV Screening  05/09/1989    DTaP,Tdap,and Td Vaccines (1 - Tdap) 05/09/1995    Annual Physical  06/11/2020    Influenza Vaccine 2020    Depression Screening PHQ  2021    BMI: Adult  2021    MAMMOGRAM  2021    Cervical Cancer Screening  2024    Pneumococcal Vaccine: Pediatrics (0 to 5 Years) and At-Risk Patients (6 to 59 Years)  Aged Out    HIB Vaccine  Aged Out    Hepatitis B Vaccine  Aged Out    IPV Vaccine  Aged Out    Hepatitis A Vaccine  Aged Out    Meningococcal ACWY Vaccine  Aged Out    HPV Vaccine  Aged Out       Past Medical History:   Diagnosis Date    BRCA negative 2019    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; [facility]    Breast cancer [address] [address] 2019    right breast    Female infertility     Fibrocystic breast     History of radiation therapy 2019    Right WBRT    Recurrent pregnancy loss, antepartum condition or complication     x 4       Past Surgical History:   Procedure Laterality Date    BREAST BIOPSY      BREAST LUMPECTOMY Right 2019    Procedure: BREAST LUMPECTOMY; BREAST NEEDLE LOCALIZATION (NEEDLE LOC AT 0800);   Surgeon: Esau Langford MD;  [facility];  Service: Surgical Oncology     SECTION      INDUCED       x3    MAMMO NEEDLE LOCALIZATION RIGHT (ALL INC) Right 2019    MAMMO STEREOTACTIC BREAST BIOPSY RIGHT (ALL INC) Right 2019       Family History   Problem Relation Age of Onset    No Known Problems Mother     No Known Problems Father     No Known Problems Sister     Diabetes Brother     Hypertension Brother     No Known Problems Son     Breast cancer Maternal Grandmother 39    Diabetes Maternal Grandmother     Heart disease Maternal Grandfather     Diabetes Maternal Grandfather     Breast cancer Paternal Grandmother 39    Diabetes Paternal Grandfather     Heart disease Paternal Grandfather     Hypertension Paternal Grandfather     No Known Problems Sister     Heart attack Maternal Aunt        Social History     Tobacco Use    Smoking status: Never Smoker    Smokeless tobacco: Never Used   Substance Use Topics    Alcohol use: Yes     Comment: rarely    Drug use: No          Current Outpatient Medications:     multivitamin (THERAGRAN) TABS, Take 1 tablet by mouth daily, Disp: , Rfl:     Allergies   Allergen Reactions    Shellfish-Derived Products Swelling     shrimp        Review of Systems:  Review of Systems   Constitutional: Negative  HENT: Negative  Eyes: Negative  Respiratory: Negative  Cardiovascular: Negative  Gastrointestinal: Negative  Endocrine: Negative  Genitourinary: Negative  Skin:        Occasional sharp pain at rt site  Skin slightly darker  Allergic/Immunologic: Negative  Neurological: Negative  Hematological: Negative  Psychiatric/Behavioral: Negative  Vitals:    08/04/20 1429   BP: 110/70   Pulse: 78   Resp: 16   Temp: 99 °F (37 2 °C)   Weight: 57 5 kg (126 lb 12 8 oz)       Pain Score: 0-No pain    Imaging:No images are attached to the encounter       Teaching    [facility]

## 2020-08-04 NOTE — PROGRESS NOTES
Surgical Oncology Follow Up       [address] [place]  CANCER [facility] SURGICAL ONCOLOGY [place]  [address] [id number]    Willis Ventura  1975  [id number]  [address] [facility] SURGICAL ONCOLOGY [place]  [address] [id number]    Chief Complaint   Patient presents with    Breast Cancer     Pt is here for 6 month f/u       Assessment/Plan:  1  Encounter for follow-up examination after completed treatment for malignant neoplasm    2  History of ductal carcinoma in situ (DCIS) of breast  - MRI breast bilateral w and wo contrast w cad; Future  - Mammo diagnostic bilateral w 3d & cad; Future  - 6 mo f/u visit    3  Monoallelic mutation of JS gene  - MRI breast bilateral w and wo contrast w cad; Future    4  Dense breasts  - MRI breast bilateral w and wo contrast w cad; Future    Discussion/Summary:  Patient is a 79-year-old female who presents today for follow-up visit for right breast cancer diagnosed in February of 2019  Her pathology revealed ductal carcinoma in situ, ER 90 percent, NJ 20 percent  She underwent genetic testing which revealed an JS mutation  She underwent a right lumpectomy with Dr Urbano Shields and completed adjuvant radiation therapy  She declined hormonal therapy  She had a bilateral breast mammogram on February 21, 2020 which was BI-RADS 2, category 4 density  She has no new complaints today and there are no concerns on today's exam   Given her extremely dense breast tissue, JS genetic mutation and history of breast cancer, I recommended adjuvant screening with annual breast MRI in addition to her annual 3D mammogram   I will order this to be performed now and I will call her with the results  I encouraged her to discuss ovarian surveillance with her gynecologist who she sees tomorrow    I will also order a mammogram for February and we will plan to see the patient back in 6 months or sooner if the need arises  She was instructed to call with any new concerns or symptoms prior to that time  All of her questions were answered today  History of Present Illness:     Oncology History   History of ductal carcinoma in situ (DCIS) of breast   2/25/2019 Biopsy    Right breast stereotatic biopsy  11 o'clock, anterior 3rd of the breast  Ductal carcinoma in situ  Grade 2  ER 90  NC 20    Concordant     3/14/2019 Genetic Testing    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  [facility]     4/2/2019 Surgery    Right lumpectomy  Ductal carcinoma in situ  1 7 cm  Grade 3  Margins negative  Stage 0     4/16/2019 - 4/16/2019 Hormone Therapy    Declined hormone therapy  Consult with Marlin Ying     5/13/2019 - 6/11/2019 Radiation    Dr Delia Bullock  Course: C1  Right breast, 16 fractions, total dose: 4256 cGy  Right breast cone down, 5 fractions, total dose: 1000 cGy          -Interval History:  Patient presents today for a follow-up visit for right breast cancer diagnosed in 2019  She notices no changes on her self breast exam   Denies headaches, back pain, bone pain, cough, shortness of breath, abdominal pain  Review of Systems:  Review of Systems   Constitutional: Negative for activity change, appetite change, chills, fatigue, fever and unexpected weight change  HENT: Negative for trouble swallowing  Eyes: Negative for pain, redness and visual disturbance  Respiratory: Negative for cough, shortness of breath and wheezing  Cardiovascular: Negative for chest pain, palpitations and leg swelling  Gastrointestinal: Negative for abdominal pain, constipation, diarrhea, nausea and vomiting  Endocrine: Negative for cold intolerance and heat intolerance  Musculoskeletal: Negative for arthralgias, back pain, gait problem and myalgias  Skin: Negative for color change and rash  Neurological: Negative for dizziness, syncope, light-headedness, numbness and headaches  Hematological: Negative for adenopathy  Psychiatric/Behavioral: Negative for agitation and confusion  All other systems reviewed and are negative  Patient Active Problem List   Diagnosis    History of ductal carcinoma in situ (DCIS) of breast    Monoallelic mutation of JS gene    Vitamin D deficiency    Other constipation    Carpal tunnel syndrome on left    Encounter for follow-up examination after completed treatment for malignant neoplasm     Past Medical History:   Diagnosis Date    BRCA negative 2019    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; [facility]    Breast cancer ([facility] [address] ) 2019    right breast    Female infertility     Fibrocystic breast     History of radiation therapy 2019    Right WBRT    Recurrent pregnancy loss, antepartum condition or complication     x 4     Past Surgical History:   Procedure Laterality Date    BREAST BIOPSY      BREAST LUMPECTOMY Right 2019    Procedure: BREAST LUMPECTOMY; BREAST NEEDLE LOCALIZATION (NEEDLE LOC AT 0800);   Surgeon: Tina Ferreira MD;  [facility];  Service: Surgical Oncology     SECTION      INDUCED       x3    MAMMO NEEDLE LOCALIZATION RIGHT ([employer]) Right 2019    MAMMO STEREOTACTIC BREAST BIOPSY RIGHT ([employer]) Right 2019     Family History   Problem Relation Age of Onset    No Known Problems Mother     No Known Problems Father     No Known Problems Sister     Diabetes Brother     Hypertension Brother     No Known Problems Son     Breast cancer Maternal Grandmother 39    Diabetes Maternal Grandmother     Heart disease Maternal Grandfather     Diabetes Maternal Grandfather     Breast cancer Paternal Grandmother 39    Diabetes Paternal Grandfather     Heart disease Paternal Grandfather     Hypertension Paternal Grandfather     No Known Problems Sister     Heart attack Maternal Aunt      Social History     Socioeconomic History    Marital status: /Civil Union     Spouse name: Not on file    Number of children: Not on file   Renée Hart Years of education: Not on file    Highest education level: Not on file   Occupational History    Not on file   Social Needs    Financial resource strain: Not on file    Food insecurity     Worry: Not on file     Inability: Not on file    Transportation needs     Medical: Not on file     Non-medical: Not on file   Tobacco Use    Smoking status: Never Smoker    Smokeless tobacco: Never Used   Substance and Sexual Activity    Alcohol use: Yes     Comment: rarely    Drug use: No    Sexual activity: Yes     Partners: Male     Birth control/protection: None   Lifestyle    Physical activity     Days per week: Not on file     Minutes per session: Not on file    Stress: Not on file   Relationships    Social connections     Talks on phone: Not on file     Gets together: Not on file     Attends Denominational service: Not on file     Active member of club or organization: Not on file     Attends meetings of clubs or organizations: Not on file     Relationship status: Not on file    Intimate partner violence     Fear of current or ex partner: Not on file     Emotionally abused: Not on file     Physically abused: Not on file     Forced sexual activity: Not on file   Other Topics Concern    Not on file   Social History Narrative    Drinks coffee    Second Marriage    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       Current Outpatient Medications:     multivitamin (THERAGRAN) TABS, Take 1 tablet by mouth daily, Disp: , Rfl:   Allergies   Allergen Reactions    Shellfish-Derived Products Swelling     shrimp     Vitals:    08/04/20 1534   BP: 120/80   Pulse: 78   Resp: 16   Temp: 97 9 °F (36 6 °C)       Physical Exam   Constitutional: She is oriented to person, place, and time  She appears well-developed  No distress  HENT:   Head: Normocephalic and atraumatic  Neck: Normal range of motion  Cardiovascular: Normal rate, regular rhythm and normal heart sounds     Pulmonary/Chest: Effort normal and breath sounds normal  Right breast exhibits skin change (surgical scar present)  Right breast exhibits no inverted nipple, no mass (dense nodular tissue noted bilaterally), no nipple discharge and no tenderness  Left breast exhibits no inverted nipple, no mass, no nipple discharge, no skin change and no tenderness  Abdominal: Soft  Normal appearance  She exhibits no mass  There is no abdominal tenderness  Musculoskeletal: Normal range of motion  Lymphadenopathy:        Right: No supraclavicular adenopathy present  Left: No supraclavicular adenopathy present  Neurological: She is alert and oriented to person, place, and time  Skin: Skin is warm and dry  No rash noted  She is not diaphoretic  Psychiatric: Her speech is normal    Vitals reviewed  Advance Care Planning/Advance Directives:  Discussed disease status, cancer treatment plans and/or cancer treatment goals with the patient

## 2020-08-05 ENCOUNTER — ANNUAL EXAM (OUTPATIENT)
Dept: OBGYN CLINIC | Facility: CLINIC | Age: 45
End: 2020-08-05
Payer: COMMERCIAL

## 2020-08-05 VITALS
HEART RATE: 75 BPM | TEMPERATURE: 96.9 F | SYSTOLIC BLOOD PRESSURE: 112 MMHG | WEIGHT: 125.8 LBS | DIASTOLIC BLOOD PRESSURE: 66 MMHG | BODY MASS INDEX: 23.77 KG/M2

## 2020-08-05 DIAGNOSIS — Z15.01 MONOALLELIC MUTATION OF ATM GENE: ICD-10-CM

## 2020-08-05 DIAGNOSIS — Z01.419 WOMEN'S ANNUAL ROUTINE GYNECOLOGICAL EXAMINATION: Primary | ICD-10-CM

## 2020-08-05 DIAGNOSIS — Z15.89 MONOALLELIC MUTATION OF ATM GENE: ICD-10-CM

## 2020-08-05 DIAGNOSIS — Z15.09 MONOALLELIC MUTATION OF ATM GENE: ICD-10-CM

## 2020-08-05 PROCEDURE — 99396 PREV VISIT EST AGE 40-64: CPT | Performed by: OBSTETRICS & GYNECOLOGY

## 2020-08-05 PROCEDURE — 3008F BODY MASS INDEX DOCD: CPT | Performed by: OBSTETRICS & GYNECOLOGY

## 2020-08-05 NOTE — PATIENT INSTRUCTIONS
In regards to your JS gene mutation, there may be a possible increased risk of ovarian cancer, but this risk is not confirmed  At this time there is no surveillance recommended for ovarian cancer due to limited efficacy  We encouraged to continue yearly annual well visit at our office  Please contact the office with any questions or concerns

## 2020-08-05 NOTE — PROGRESS NOTES
ASSESSMENT & PLAN:   Diagnoses and all orders for this visit:    Women's annual routine gynecological examination    Monoallelic mutation of JS gene   - possible increased risk of ovarian cancer, but not confirmed   or imaging is not recommended  Due to limited efficacy  The following were reviewed in today's visit: ASCCP guidelines, yearly mammography, breast self exam, exercise and healthy diet  Patient to return to office yearly for annual exam      All questions have been answered to her satisfaction  CC:  Annual Gynecologic Examination    HPI: Lary Gr is a 54 y o  [id number] who presents for annual gynecologic examination  She has the following concerns:  JS mutation - ? surveillance - limited efficacy, ovarian cancer surveillance is not recommended  Health Maintenance:    She exercises 6 days per week  walking  She wears her seatbelt routinely  She does perform regular monthly self breast exams  Patient does try to follow a balanced diet  Last mammogram: 2021, MRI this week     Past Medical History:   Diagnosis Date    BRCA negative 2019    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; [facility]    [facility]    [facility] breast    [facility]    breast    Female infertility     Fibrocystic breast     History of radiation therapy 2019    Right WBRT    Recurrent pregnancy loss, antepartum condition or complication     x 4       Past Surgical History:   Procedure Laterality Date    BREAST BIOPSY      BREAST LUMPECTOMY Right 2019    Procedure: BREAST LUMPECTOMY; BREAST NEEDLE LOCALIZATION (NEEDLE LOC AT 0800);   Surgeon: Tina Ferreira MD;  [facility];  Service: Surgical Oncology     SECTION      INDUCED       x3    MAMMO NEEDLE LOCALIZATION RIGHT ([employer]) Right 2019    MAMMO STEREOTACTIC BREAST BIOPSY RIGHT (ALL INC) Right 2019       Past OB/Gyn History:  Period Cycle (Days): 28  Period Duration (Days): 3-5  Period Pattern: Regular  Menstrual Flow: Light  Dysmenorrhea: (!) Moderate  Dysmenorrhea Symptoms: Cramping, NauseaPatient's last menstrual period was 07/25/2020  Patient is not postmenopausal    History of sexually transmitted infection No  Patient is currently sexually active    Birth control: no method  Last Pap:  2019 NILM, neg HPV    Family History:  Family History   Problem Relation Age of Onset    No Known Problems Mother     No Known Problems Father     No Known Problems Sister     Diabetes Brother     Hypertension Brother     No Known Problems Son     Breast cancer Maternal Grandmother 39    Diabetes Maternal Grandmother     Heart disease Maternal Grandfather     Diabetes Maternal Grandfather     Breast cancer Paternal Grandmother 39    Diabetes Paternal Grandfather     Heart disease Paternal Grandfather     Hypertension Paternal Grandfather     No Known Problems Sister     Heart attack Maternal Aunt        Social History:  Social History     Socioeconomic History    Marital status: /Civil Union     Spouse name: Not on file    Number of children: Not on file    Years of education: Not on file    Highest education level: Not on file   Occupational History    Not on file   Social Needs    Financial resource strain: Not on file    Food insecurity     Worry: Not on file     Inability: Not on file   [employer] needs     Medical: Not on file     Non-medical: Not on file   Tobacco Use    Smoking status: Never Smoker    Smokeless tobacco: Never Used   Substance and Sexual Activity    Alcohol use: Not Currently     Comment: rarely    Drug use: No    Sexual activity: Yes     Partners: Male     Birth control/protection: None   Lifestyle    Physical activity     Days per week: Not on file     Minutes per session: Not on file    Stress: Not on file   Relationships    Social connections     Talks on phone: Not on file     Gets together: Not on file     Attends Evangelical service: Not on file     Active member of club or organization: Not on file     Attends meetings of clubs or organizations: Not on file     Relationship status: Not on file    Intimate partner violence     Fear of current or ex partner: Not on file     Emotionally abused: Not on file     Physically abused: Not on file     Forced sexual activity: Not on file   Other Topics Concern    Not on file   Social History Narrative    Drinks coffee    Second Marriage    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     Presently lives with   She feels safe at home  Patient is currently employed  Allergies: Allergies   Allergen Reactions    Shellfish-Derived Products Swelling     shrimp       Medications:    Current Outpatient Medications:     multivitamin (THERAGRAN) TABS, Take 1 tablet by mouth daily, Disp: , Rfl:     Review of Systems:  Review of Systems   Constitutional: Negative for chills, fever and unexpected weight change  Respiratory: Negative for cough and shortness of breath  Cardiovascular: Negative for chest pain and palpitations  Gastrointestinal: Positive for nausea (first few days of period)  Negative for abdominal distention, abdominal pain, blood in stool, constipation and vomiting  Genitourinary: Negative for difficulty urinating, dyspareunia, dysuria, flank pain, genital sores, hematuria, menstrual problem, pelvic pain, urgency, vaginal bleeding, vaginal discharge and vaginal pain  Neurological: Negative for headaches  Physical Exam:  /66   Pulse 75   Temp (!) 96 9 °F (36 1 °C)   Wt 57 1 kg (125 lb 12 8 oz)   LMP 07/25/2020   Breastfeeding No   BMI 23 77 kg/m²      Physical Exam  Constitutional:       General: She is awake  Appearance: Normal appearance  She is well-developed  She is obese  Genitourinary:      Pelvic exam was performed with patient supine  Vulva, urethra, bladder, vagina and uterus normal       No vulval lesion, ulcerations or rash noted  No urethral prolapse present  Bladder is not distended or tender  No vaginal discharge, erythema, tenderness, bleeding, atrophy or prolapse  No cervical motion tenderness, discharge, lesion or polyp  Uterus is not enlarged, tender, irregular or mobile  No uterine mass detected  Uterus is anteverted and regular  No right or left adnexal mass present  Right adnexa not tender or full  Left adnexa not tender or full  HENT:      Head: Normocephalic and atraumatic  Neck:      Musculoskeletal: Neck supple  Cardiovascular:      Rate and Rhythm: Normal rate and regular rhythm  Heart sounds: Normal heart sounds  Pulmonary:      Effort: Pulmonary effort is normal  No tachypnea or respiratory distress  Breath sounds: Normal breath sounds  Chest:      Breasts:         Right: Normal  No inverted nipple, mass, nipple discharge, skin change or tenderness  Left: Normal  No inverted nipple, mass, nipple discharge, skin change or tenderness  Abdominal:      General: There is no distension  Palpations: Abdomen is soft  Tenderness: There is no abdominal tenderness  There is no guarding  Lymphadenopathy:      Upper Body:      Right upper body: No supraclavicular or axillary adenopathy  Left upper body: No supraclavicular or axillary adenopathy  Neurological:      General: No focal deficit present  Mental Status: She is alert and oriented to person, place, and time  Psychiatric:         Mood and Affect: Mood normal          Behavior: Behavior normal          Thought Content: Thought content normal          Judgment: Judgment normal    Vitals signs reviewed

## 2020-08-31 ENCOUNTER — HOSPITAL ENCOUNTER (OUTPATIENT)
Dept: RADIOLOGY | Facility: HOSPITAL | Age: 45
Discharge: HOME | End: 2020-08-31
Payer: COMMERCIAL

## 2020-08-31 DIAGNOSIS — Z15.09 MONOALLELIC MUTATION OF ATM GENE: ICD-10-CM

## 2020-08-31 DIAGNOSIS — Z15.89 MONOALLELIC MUTATION OF ATM GENE: ICD-10-CM

## 2020-08-31 DIAGNOSIS — Z86.000 HISTORY OF DUCTAL CARCINOMA IN SITU (DCIS) OF BREAST: ICD-10-CM

## 2020-08-31 DIAGNOSIS — Z15.01 MONOALLELIC MUTATION OF ATM GENE: ICD-10-CM

## 2020-08-31 DIAGNOSIS — R92.2 DENSE BREASTS: ICD-10-CM

## 2020-08-31 PROCEDURE — G1004 CDSM NDSC: HCPCS

## 2020-08-31 PROCEDURE — A9585 GADOBUTROL INJECTION: HCPCS | Performed by: NURSE PRACTITIONER

## 2020-08-31 PROCEDURE — C8908 MRI W/O FOL W/CONT, BREAST,: HCPCS

## 2020-08-31 PROCEDURE — C8937 CAD BREAST MRI: HCPCS

## 2020-08-31 RX ADMIN — GADOBUTROL 6 ML: 604.72 INJECTION INTRAVENOUS at 14:22

## 2021-02-24 ENCOUNTER — HOSPITAL ENCOUNTER (OUTPATIENT)
Dept: RADIOLOGY | Facility: HOSPITAL | Age: 46
Discharge: HOME | End: 2021-02-24
Payer: COMMERCIAL

## 2021-02-24 VITALS — WEIGHT: 125 LBS | BODY MASS INDEX: 23.6 KG/M2 | HEIGHT: 61 IN

## 2021-02-24 DIAGNOSIS — Z86.000 HISTORY OF DUCTAL CARCINOMA IN SITU (DCIS) OF BREAST: ICD-10-CM

## 2021-02-24 PROCEDURE — G0279 TOMOSYNTHESIS, MAMMO: HCPCS

## 2021-02-24 PROCEDURE — 77066 DX MAMMO INCL CAD BI: CPT

## 2021-03-03 ENCOUNTER — OFFICE VISIT (OUTPATIENT)
Dept: SURGICAL ONCOLOGY | Facility: CLINIC | Age: 46
End: 2021-03-03
Payer: COMMERCIAL

## 2021-03-03 VITALS
WEIGHT: 133 LBS | HEART RATE: 63 BPM | RESPIRATION RATE: 16 BRPM | TEMPERATURE: 98.3 F | HEIGHT: 61 IN | DIASTOLIC BLOOD PRESSURE: 80 MMHG | SYSTOLIC BLOOD PRESSURE: 122 MMHG | BODY MASS INDEX: 25.11 KG/M2

## 2021-03-03 DIAGNOSIS — R92.2 DENSE BREASTS: ICD-10-CM

## 2021-03-03 DIAGNOSIS — Z08 ENCOUNTER FOR FOLLOW-UP EXAMINATION AFTER COMPLETED TREATMENT FOR MALIGNANT NEOPLASM: Primary | ICD-10-CM

## 2021-03-03 DIAGNOSIS — Z15.01 MONOALLELIC MUTATION OF ATM GENE: ICD-10-CM

## 2021-03-03 DIAGNOSIS — Z15.09 MONOALLELIC MUTATION OF ATM GENE: ICD-10-CM

## 2021-03-03 DIAGNOSIS — Z86.000 HISTORY OF DUCTAL CARCINOMA IN SITU (DCIS) OF BREAST: ICD-10-CM

## 2021-03-03 DIAGNOSIS — Z15.89 MONOALLELIC MUTATION OF ATM GENE: ICD-10-CM

## 2021-03-03 PROCEDURE — 99213 OFFICE O/P EST LOW 20 MIN: CPT | Performed by: NURSE PRACTITIONER

## 2021-03-03 NOTE — PROGRESS NOTES
Surgical Oncology Follow Up       [address] [place]  CANCER [facility] LISANDRA  [address] [address]    [place] Mace  1975  [id number]  [address] [place]  CANCER [facility] SURGICAL ONCOLOGY Candia  [address] [facility] [address] [id number]    Chief Complaint   Patient presents with    Breast Cancer     Pt is here for 6 month f/u       Assessment/Plan:  1  Encounter for follow-up examination after completed treatment for malignant neoplasm    2  History of ductal carcinoma in situ (DCIS) of breast  - MRI breast bilateral w and wo contrast w cad; Future  - 6 mo f/u visit    3  Monoallelic mutation of JS gene  - MRI breast bilateral w and wo contrast w cad; Future    4  Dense breasts  - MRI breast bilateral w and wo contrast w cad; Future      Discussion/Summary: Patient is a 50-year-old female who presents today for follow-up visit for right breast cancer diagnosed in February of 2019  Her pathology revealed ductal carcinoma in situ, ER 90 percent, WA 20 percent  She underwent genetic testing which revealed an JS mutation  She underwent a right lumpectomy with Dr Andrzej Lovelace and completed adjuvant radiation therapy  She declined hormonal therapy  Given her extremely dense breast tissue, JS genetic mutation and history of breast cancer, I recommended adjuvant screening with annual breast MRI in addition to her annual 3D mammogram  She had a  breast MRI on August 31, 2020 which was BI-RADS 2  She had a bilateral 3D screening mammogram on February 24, 2021 which was BI-RADS 2, category 4 density  She offers no new complaints today and there are no concerns on today's exam   I will order a bilateral breast MRI for September and plan to see the patient back again in 6 months or sooner should the need arise  She was instructed to call with any new concerns or symptoms prior to that time  All of her questions were answered today      History of Present Illness:     Oncology History   History of ductal carcinoma in situ (DCIS) of breast   2/25/2019 Biopsy    Right breast stereotatic biopsy  11 o'clock, anterior 3rd of the breast  Ductal carcinoma in situ  Grade 2  ER 90  VT 20    Concordant     3/14/2019 Genetic Testing    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  [facility]     4/2/2019 Surgery    Right lumpectomy  Ductal carcinoma in situ  1 7 cm  Grade 3  Margins negative  Stage 0     4/16/2019 - 4/16/2019 Hormone Therapy    Declined hormone therapy  Consult with Zulma Kaplan     5/13/2019 - 6/11/2019 Radiation    Dr Hubert Owen  Course: C1  Right breast, 16 fractions, total dose: 4256 cGy  Right breast cone down, 5 fractions, total dose: 1000 cGy          -Interval History:  Patient notices no changes on self-exam   She denies persistent headaches, back pain or bone pain, cough or shortness of breath, abdominal pain  She had a bilateral mammogram on February 24th which was BI-RADS 2  Review of Systems:  Review of Systems   Constitutional: Negative for activity change, appetite change, chills, fatigue, fever and unexpected weight change  Respiratory: Negative for cough and shortness of breath  Cardiovascular: Negative for chest pain  Gastrointestinal: Negative for abdominal pain, constipation, diarrhea, nausea and vomiting  Musculoskeletal: Negative for arthralgias, back pain, gait problem and myalgias  Skin: Negative for color change and rash  Neurological: Negative for dizziness and headaches  Hematological: Negative for adenopathy  Psychiatric/Behavioral: Negative for agitation and confusion  All other systems reviewed and are negative        Patient Active Problem List   Diagnosis    History of ductal carcinoma in situ (DCIS) of breast    Monoallelic mutation of JS gene    Vitamin D deficiency    Other constipation    Carpal tunnel syndrome on left    Encounter for follow-up examination after completed treatment for malignant neoplasm     Past Medical History:   Diagnosis Date    BRCA negative 2019    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; [facility]    Breast cancer ([facility] [address] ) 2019    right breast    Cancer ([facility] [address] ) 2019    breast    Female infertility     Fibrocystic breast     History of radiation therapy 2019    Right WBRT    Recurrent pregnancy loss, antepartum condition or complication     x 4     Past Surgical History:   Procedure Laterality Date    BREAST BIOPSY      BREAST LUMPECTOMY Right 2019    Procedure: BREAST LUMPECTOMY; BREAST NEEDLE LOCALIZATION (NEEDLE LOC AT 0800);   Surgeon: Ismael Jerez MD;  [facility];  Service: Surgical Oncology     SECTION      INDUCED       x3    MAMMO NEEDLE LOCALIZATION RIGHT (ALL INC) Right 2019    MAMMO STEREOTACTIC BREAST BIOPSY RIGHT (ALL INC) Right 2019     Family History   Problem Relation Age of Onset    No Known Problems Mother     No Known Problems Father     No Known Problems Sister     Diabetes Brother     Hypertension Brother     No Known Problems Son     Breast cancer Maternal Grandmother 39    Diabetes Maternal Grandmother     Heart disease Maternal Grandfather     Diabetes Maternal Grandfather     Breast cancer Paternal Grandmother 39    Diabetes Paternal Grandfather     Heart disease Paternal Grandfather     Hypertension Paternal Grandfather     No Known Problems Sister     Heart attack Maternal Aunt      Social History     Socioeconomic History    Marital status: /Civil Union     Spouse name: Not on file    Number of children: Not on file    Years of education: Not on file    Highest education level: Not on file   Occupational History    Not on file   Social Needs    Financial resource strain: Not on file    Food insecurity     Worry: Not on file     Inability: Not on file    Transportation needs     Medical: Not on file     Non-medical: Not on file   Tobacco Use    Smoking status: Never Smoker    Smokeless tobacco: Never Used   Substance and Sexual Activity    Alcohol use: Not Currently     Comment: rarely    Drug use: No    Sexual activity: Yes     Partners: Male     Birth control/protection: None   Lifestyle    Physical activity     Days per week: Not on file     Minutes per session: Not on file    Stress: Not on file   Relationships    Social connections     Talks on phone: Not on file     Gets together: Not on file     Attends Rastafarian service: Not on file     Active member of club or organization: Not on file     Attends meetings of clubs or organizations: Not on file     Relationship status: Not on file    Intimate partner violence     Fear of current or ex partner: Not on file     Emotionally abused: Not on file     Physically abused: Not on file     Forced sexual activity: Not on file   Other Topics Concern    Not on file   Social History Narrative    Drinks coffee    Second Marriage    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       Current Outpatient Medications:     multivitamin (THERAGRAN) TABS, Take 1 tablet by mouth daily, Disp: , Rfl:   Allergies   Allergen Reactions    Shellfish-Derived Products (Food Allergy) Swelling     shrimp     Vitals:    03/03/21 0807   BP: 122/80   Pulse: 63   Resp: 16   Temp: 98 3 °F (36 8 °C)       Physical Exam  Vitals signs reviewed  Constitutional:       General: She is not in acute distress  Appearance: Normal appearance  She is well-developed  She is not diaphoretic  HENT:      Head: Normocephalic and atraumatic  Neck:      Musculoskeletal: Normal range of motion  Cardiovascular:      Rate and Rhythm: Normal rate and regular rhythm  Heart sounds: Normal heart sounds  Pulmonary:      Effort: Pulmonary effort is normal       Breath sounds: Normal breath sounds  Chest:      Breasts:         Right: Skin change (surgical scar present) present   No swelling, bleeding, inverted nipple, mass (dense nodular tissue noted bilaterally), nipple discharge or tenderness  Left: No swelling, bleeding, inverted nipple, mass, nipple discharge, skin change or tenderness  Abdominal:      Palpations: Abdomen is soft  There is no mass  Tenderness: There is no abdominal tenderness  Musculoskeletal: Normal range of motion  Lymphadenopathy:      Upper Body:      Right upper body: No supraclavicular or axillary adenopathy  Left upper body: No supraclavicular or axillary adenopathy  Skin:     General: Skin is warm and dry  Findings: No rash  Neurological:      Mental Status: She is alert and oriented to person, place, and time  Psychiatric:         Speech: Speech normal            Results:    Imaging  Mammo Diagnostic Bilateral W 3d & Cad    Result Date: 2/24/2021  Narrative: DIAGNOSIS: History of ductal carcinoma in situ (DCIS) of breast TECHNIQUE: Digital screening mammography was performed  Computer Aided Detection (CAD) analyzed all applicable images  COMPARISONS: Prior breast imaging dated: 08/31/2020, 02/21/2020, 04/02/2019, 04/02/2019, 02/25/2019, 02/25/2019, 02/20/2019, 02/06/2019, and 05/26/2016 RELEVANT HISTORY: Family Breast Cancer History: History of breast cancer in Maternal Grandmother, Paternal Grandmother  Family Medical History: Family medical history includes breast cancer in 2 relatives (maternal grandmother, paternal grandmother)  Personal History: No known relevant hormone history  Surgical history includes breast biopsy and lumpectomy  Medical history includes fibrocystic breast and breast cancer  RISK ASSESSMENT: 5 Year Tyrer-Cuzick: 1 95 % 10 Year Tyrer-Cuzick: 4 41 % Lifetime Tyrer-Cuzick: 22 73 % TISSUE DENSITY: The breasts are extremely dense, which lowers the sensitivity of mammography  INDICATION: Germain Martin is a 54 y o  female presenting for history of breast ca   FINDINGS: RIGHT 2) POST-SURGICAL FINDING: There are post-surgical findings from a previous lumpectomy with radiation seen in the retroareolar region of the right breast  LEFT There are no suspicious masses, grouped microcalcifications or areas of architectural distortion  The skin and nipple areolar complex are unremarkable  Impression:  Therapeutic changes right breast   No evidence for malignancy  This patient has been identified as being at elevated risk for development of breast cancer based on the Tyrer-Cuzick model  She may benefit from supplemental screening  ASSESSMENT/BI-RADS CATEGORY: Left: 1 - Negative Right: 2 - Benign Overall: 2 - Benign RECOMMENDATION:      - Diagnostic mammogram in 1 year for both breasts  Workstation ID: [id number]      I reviewed the above imaging data  Advance Care Planning/Advance Directives:  Discussed disease status, cancer treatment plans and/or cancer treatment goals with the patient

## 2021-03-10 DIAGNOSIS — Z23 ENCOUNTER FOR IMMUNIZATION: ICD-10-CM

## 2021-03-12 ENCOUNTER — IMMUNIZATIONS (OUTPATIENT)
Dept: FAMILY MEDICINE CLINIC | Facility: HOSPITAL | Age: 46
End: 2021-03-12

## 2021-03-12 DIAGNOSIS — Z23 ENCOUNTER FOR IMMUNIZATION: Primary | ICD-10-CM

## 2021-03-12 PROCEDURE — 0001A SARS-COV-2 / COVID-19 MRNA VACCINE (PFIZER-BIONTECH) 30 MCG: CPT

## 2021-03-12 PROCEDURE — 91300 SARS-COV-2 / COVID-19 MRNA VACCINE (PFIZER-BIONTECH) 30 MCG: CPT

## 2021-04-03 ENCOUNTER — IMMUNIZATIONS (OUTPATIENT)
Dept: FAMILY MEDICINE CLINIC | Facility: HOSPITAL | Age: 46
End: 2021-04-03

## 2021-04-03 DIAGNOSIS — Z23 ENCOUNTER FOR IMMUNIZATION: Primary | ICD-10-CM

## 2021-04-03 PROCEDURE — 0002A SARS-COV-2 / COVID-19 MRNA VACCINE (PFIZER-BIONTECH) 30 MCG: CPT

## 2021-04-03 PROCEDURE — 91300 SARS-COV-2 / COVID-19 MRNA VACCINE (PFIZER-BIONTECH) 30 MCG: CPT

## 2021-06-04 ENCOUNTER — OFFICE VISIT (OUTPATIENT)
Dept: INTERNAL MEDICINE CLINIC | Facility: CLINIC | Age: 46
End: 2021-06-04
Payer: COMMERCIAL

## 2021-06-04 VITALS
DIASTOLIC BLOOD PRESSURE: 68 MMHG | WEIGHT: 133.4 LBS | HEART RATE: 79 BPM | OXYGEN SATURATION: 99 % | BODY MASS INDEX: 25.21 KG/M2 | TEMPERATURE: 97.2 F | SYSTOLIC BLOOD PRESSURE: 110 MMHG

## 2021-06-04 DIAGNOSIS — Z13.220 SCREENING FOR LIPID DISORDERS: ICD-10-CM

## 2021-06-04 DIAGNOSIS — G44.209 TENSION HEADACHE: Primary | ICD-10-CM

## 2021-06-04 PROCEDURE — 99213 OFFICE O/P EST LOW 20 MIN: CPT | Performed by: NURSE PRACTITIONER

## 2021-06-04 NOTE — PROGRESS NOTES
Assessment/Plan:    Increase water intake, 6-8 cups daily  Advised to take ibuprofen 200 mg 3 times a day for 2-3 days  Stress reduction/relaxation techniques  Check labs  Call if not improving  Diagnoses and all orders for this visit:    Tension headache  -     Comprehensive metabolic panel; Future  -     CBC and differential; Future  -     TSH, 3rd generation with Free T4 reflex; Future  -     Comprehensive metabolic panel  -     CBC and differential  -     TSH, 3rd generation with Free T4 reflex    Screening for lipid disorders  -     Lipid Panel with Direct LDL reflex; Future  -     Lipid Panel with Direct LDL reflex          Subjective:      Patient ID: Caitlin Conner is a 51 y o  female  Here today with complaints of a headache  Headache started about 2 weeks ago  Pain is frontal and she describes it as a dull ache  The pain comes and goes  She gets a headache every other day or so  She denies any vision changes  No photophobia  She has slight nausea at times  She took advil once with relief  She is mostly worried about her blood pressure or sugar  She denies any associated cold symptoms, no rhinorrhea, sore throat, or ear pain  She does admit to not drinking enough water lately  Also more stress at work  The following portions of the patient's history were reviewed and updated as appropriate: allergies, current medications, past family history, past medical history, past social history, past surgical history and problem list     Review of Systems   Constitutional: Negative for activity change, appetite change, fatigue and fever  HENT: Negative for congestion, ear pain, postnasal drip, rhinorrhea and sore throat  Eyes: Negative for visual disturbance  Respiratory: Negative for cough and shortness of breath  Cardiovascular: Negative for chest pain, palpitations and leg swelling  Gastrointestinal: Positive for nausea   Negative for abdominal pain, constipation, diarrhea and vomiting  Genitourinary: Negative for difficulty urinating  Musculoskeletal: Negative for myalgias  Skin: Negative for rash  Neurological: Positive for headaches  Negative for dizziness, weakness and light-headedness  Objective:      /68   Pulse 79   Temp (!) 97 2 °F (36 2 °C)   Wt 60 5 kg (133 lb 6 4 oz)   SpO2 99%   BMI 25 21 kg/m²          Physical Exam  Vitals signs reviewed  Constitutional:       Appearance: Normal appearance  HENT:      Head: Normocephalic and atraumatic  Right Ear: Tympanic membrane, ear canal and external ear normal       Left Ear: Tympanic membrane, ear canal and external ear normal    Eyes:      Conjunctiva/sclera: Conjunctivae normal       Pupils: Pupils are equal, round, and reactive to light  Neck:      Musculoskeletal: Neck supple  Thyroid: No thyromegaly  Cardiovascular:      Rate and Rhythm: Normal rate and regular rhythm  Heart sounds: Normal heart sounds  Pulmonary:      Effort: Pulmonary effort is normal       Breath sounds: Normal breath sounds  Lymphadenopathy:      Cervical: No cervical adenopathy  Skin:     General: Skin is warm and dry  Neurological:      General: No focal deficit present  Mental Status: She is alert     Psychiatric:         Behavior: Behavior normal

## 2021-06-13 LAB
ALBUMIN SERPL-MCNC: 4.1 G/DL (ref 3.8–4.8)
ALBUMIN/GLOB SERPL: 1.7 {RATIO} (ref 1.2–2.2)
ALP SERPL-CCNC: 74 IU/L (ref 48–121)
ALT SERPL-CCNC: 15 IU/L (ref 0–32)
AST SERPL-CCNC: 18 IU/L (ref 0–40)
BASOPHILS # BLD AUTO: 0.1 X10E3/UL (ref 0–0.2)
BASOPHILS NFR BLD AUTO: 1 %
BILIRUB SERPL-MCNC: 0.2 MG/DL (ref 0–1.2)
BUN SERPL-MCNC: 6 MG/DL (ref 6–24)
BUN/CREAT SERPL: 8 (ref 9–23)
CALCIUM SERPL-MCNC: 9.2 MG/DL (ref 8.7–10.2)
CHLORIDE SERPL-SCNC: 104 MMOL/L (ref 96–106)
CHOLEST SERPL-MCNC: 178 MG/DL (ref 100–199)
CO2 SERPL-SCNC: 26 MMOL/L (ref 20–29)
CREAT SERPL-MCNC: 0.74 MG/DL (ref 0.57–1)
EOSINOPHIL # BLD AUTO: 0.3 X10E3/UL (ref 0–0.4)
EOSINOPHIL NFR BLD AUTO: 4 %
ERYTHROCYTE [DISTWIDTH] IN BLOOD BY AUTOMATED COUNT: 12 % (ref 11.7–15.4)
GLOBULIN SER-MCNC: 2.4 G/DL (ref 1.5–4.5)
GLUCOSE SERPL-MCNC: 83 MG/DL (ref 65–99)
HCT VFR BLD AUTO: 40.2 % (ref 34–46.6)
HDLC SERPL-MCNC: 60 MG/DL
HGB BLD-MCNC: 12.9 G/DL (ref 11.1–15.9)
IMM GRANULOCYTES # BLD: 0 X10E3/UL (ref 0–0.1)
IMM GRANULOCYTES NFR BLD: 0 %
LDLC SERPL CALC-MCNC: 105 MG/DL (ref 0–99)
LDLC/HDLC SERPL: 1.8 RATIO (ref 0–3.2)
LYMPHOCYTES # BLD AUTO: 1.8 X10E3/UL (ref 0.7–3.1)
LYMPHOCYTES NFR BLD AUTO: 25 %
MCH RBC QN AUTO: 28.4 PG (ref 26.6–33)
MCHC RBC AUTO-ENTMCNC: 32.1 G/DL (ref 31.5–35.7)
MCV RBC AUTO: 89 FL (ref 79–97)
MONOCYTES # BLD AUTO: 0.6 X10E3/UL (ref 0.1–0.9)
MONOCYTES NFR BLD AUTO: 9 %
NEUTROPHILS # BLD AUTO: 4.4 X10E3/UL (ref 1.4–7)
NEUTROPHILS NFR BLD AUTO: 61 %
PLATELET # BLD AUTO: 371 X10E3/UL (ref 150–450)
POTASSIUM SERPL-SCNC: 4.4 MMOL/L (ref 3.5–5.2)
PROT SERPL-MCNC: 6.5 G/DL (ref 6–8.5)
RBC # BLD AUTO: 4.54 X10E6/UL (ref 3.77–5.28)
SL AMB EGFR AFRICAN AMERICAN: 112 ML/MIN/1.73
SL AMB EGFR NON AFRICAN AMERICAN: 97 ML/MIN/1.73
SL AMB VLDL CHOLESTEROL CALC: 13 MG/DL (ref 5–40)
SODIUM SERPL-SCNC: 138 MMOL/L (ref 134–144)
TRIGL SERPL-MCNC: 68 MG/DL (ref 0–149)
TSH SERPL DL<=0.005 MIU/L-ACNC: 1.61 UIU/ML (ref 0.45–4.5)
WBC # BLD AUTO: 7.2 X10E3/UL (ref 3.4–10.8)

## 2021-09-14 ENCOUNTER — RADIATION ONCOLOGY FOLLOW-UP (OUTPATIENT)
Dept: RADIATION ONCOLOGY | Facility: HOSPITAL | Age: 46
End: 2021-09-14
Attending: RADIOLOGY
Payer: COMMERCIAL

## 2021-09-14 VITALS
BODY MASS INDEX: 24.79 KG/M2 | SYSTOLIC BLOOD PRESSURE: 111 MMHG | HEART RATE: 79 BPM | WEIGHT: 131.2 LBS | RESPIRATION RATE: 16 BRPM | DIASTOLIC BLOOD PRESSURE: 76 MMHG | TEMPERATURE: 97.1 F

## 2021-09-14 DIAGNOSIS — Z86.000 HISTORY OF DUCTAL CARCINOMA IN SITU (DCIS) OF BREAST: Primary | ICD-10-CM

## 2021-09-14 PROCEDURE — G0463 HOSPITAL OUTPT CLINIC VISIT: HCPCS | Performed by: RADIOLOGY

## 2021-09-14 PROCEDURE — 99213 OFFICE O/P EST LOW 20 MIN: CPT | Performed by: RADIOLOGY

## 2021-09-14 PROCEDURE — 99211 OFF/OP EST MAY X REQ PHY/QHP: CPT | Performed by: RADIOLOGY

## 2021-09-14 NOTE — PROGRESS NOTES
Reena Olson 1975 is a 51 y o  female  with a h/o right breast ductal carcinoma s/p lumpectomy and radiation which completed 19  She was last seen by radiation oncology on 20  She is seen today for her 1 year follow up     20 - Surg Onc, [place]  Pt declined hormonal therapy  Pt has extremely dense breast tissue, JS genetic mutation and hx of breast cancer, will do MRI annually in addition to annual 3D mammogram  Encouraged pt to discuss ovarian surveillance with gynecologist     / - Giancarlo MACK / imaging not recommeded due to limited efficacy  20 - MRI breast bilateral w and wo contrast w cad  There are no suspicious enhancing masses or suspicious areas of nonmass enhancement  There is no axillary or internal mammary adenopathy  Post lumpectomy changes are noted in the right breast   IMPRESSION:  No evidence of malignancy  ASSESSMENT/BI-RADS CATEGORY:  Left: 1 - Negative  Right: 2 - Benign  Overall: 2 - Benign    21 - Mammo diagnostic bilateral w 3d & cad  IMPRESSION:   Therapeutic changes right breast   No evidence for malignancy  This patient has been identified as being at elevated risk for development of breast cancer based on the Tyrer-Cuzick model  She may benefit from supplemental screening  ASSESSMENT/BI-RADS CATEGORY:  Left: 1 - Negative  Right: 2 - Benign  Overall: 2 - Benign    21 - Surg Onc, [place]  No complaints, no concerns       Upcomin21 - MRI breast bilateral w and wo contrast w cad  21 - Jojo [facility]          Follow up visit       Oncology History   History of ductal carcinoma in situ (DCIS) of breast   2019 Biopsy    Right breast stereotatic biopsy  11 o'clock, anterior 3rd of the breast  Ductal carcinoma in situ  Grade 2  ER 90  SC 20    Concordant     3/14/2019 Genetic Testing    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  [facility]     2019 Surgery    Right lumpectomy  Ductal carcinoma in situ  1 7 cm  Grade 3  Margins negative  Stage 0     2019 - 2019 Hormone Therapy    Declined hormone therapy  Consult with Elli Dunn     2019 - 2019 Radiation    Dr Sai Lambert  Course: C1  Right breast, 16 fractions, total dose: 4256 cGy  Right breast cone down, 5 fractions, total dose: 1000 cGy         Clinical Trial: no      Health Maintenance   Topic Date Due    Hepatitis C Screening  Never done    HIV Screening  Never done    DTaP,Tdap,and Td Vaccines (1 - Tdap) Never done    Annual Physical  2021    Influenza Vaccine (1) 2021    Breast Cancer Screening: Mammogram  2022    BMI: Adult  2022    Depression Screening  2022    Cervical Cancer Screening  2024    COVID-19 Vaccine  Completed    Pneumococcal Vaccine: Pediatrics (0 to 5 Years) and At-Risk Patients (6 to 59 Years)  Aged Out    HIB Vaccine  Aged Out    Hepatitis B Vaccine  Aged Out    IPV Vaccine  Aged Out    Hepatitis A Vaccine  Aged Out    Meningococcal ACWY Vaccine  Aged Out    HPV Vaccine  Aged Out       Patient Active Problem List   Diagnosis    History of ductal carcinoma in situ (DCIS) of breast    Monoallelic mutation of JS gene    Vitamin D deficiency    Other constipation    Carpal tunnel syndrome on left    Encounter for follow-up examination after completed treatment for malignant neoplasm     Past Medical History:   Diagnosis Date    BRCA negative 2019    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; [facility]    Breast cancer ([facility] [address] ) 2019    right breast    Cancer ([facility] [address] ) 2019    breast    Female infertility     Fibrocystic breast     History of radiation therapy 2019    Right WBRT    Recurrent pregnancy loss, antepartum condition or complication     x 4     Past Surgical History:   Procedure Laterality Date    BREAST BIOPSY      BREAST LUMPECTOMY Right 2019    Procedure: BREAST LUMPECTOMY; BREAST NEEDLE LOCALIZATION (NEEDLE LOC AT 0800);   Surgeon: Argenis Raymundo MD; [facility];  Service: Surgical Oncology     SECTION      INDUCED       x3    MAMMO NEEDLE LOCALIZATION RIGHT (ALL INC) Right 2019    MAMMO STEREOTACTIC BREAST BIOPSY RIGHT (ALL INC) Right 2019     Family History   Problem Relation Age of Onset    No Known Problems Mother     No Known Problems Father     No Known Problems Sister     Diabetes Brother     Hypertension Brother     No Known Problems Son     Breast cancer Maternal Grandmother 39    Diabetes Maternal Grandmother     Heart disease Maternal Grandfather     Diabetes Maternal Grandfather     Breast cancer Paternal Grandmother 39    Diabetes Paternal Grandfather     Heart disease Paternal Grandfather     Hypertension Paternal Grandfather     No Known Problems Sister     Heart attack Maternal Aunt      Social History     Socioeconomic History    Marital status: /Civil Union     Spouse name: Not on file    Number of children: Not on file    Years of education: Not on file    Highest education level: Not on file   Occupational History    Not on file   Tobacco Use    Smoking status: Never Smoker    Smokeless tobacco: Never Used   Vaping Use    Vaping Use: Never used   Substance and Sexual Activity    Alcohol use: Not Currently     Comment: rarely    Drug use: No    Sexual activity: Yes     Partners: Male     Birth control/protection: None   Other Topics Concern    Not on file   Social History Narrative    Drinks coffee    Second Marriage    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     Social Determinants of Health     Financial Resource Strain:     Difficulty of Paying Living Expenses:    Food Insecurity:     Worried About Running Out of Food in the Last Year:     [address] in the Last Year:    Transportation Needs:     Lack of Transportation (Medical):      Lack of Transportation (Non-Medical):    Physical Activity:     Days of Exercise per Week:     Minutes of Exercise per Session: Stress:     Feeling of Stress :    Social Connections:     Frequency of Communication with Friends and Family:     Frequency of Social Gatherings with Friends and Family:     Attends Confucianism Services:     Active Member of Clubs or Organizations:     Attends Club or Organization Meetings:     Marital Status:    Intimate Partner Violence:     Fear of Current or Ex-Partner:     Emotionally Abused:     Physically Abused:     Sexually Abused:        Current Outpatient Medications:     multivitamin (THERAGRAN) TABS, Take 1 tablet by mouth daily, Disp: , Rfl:   Allergies   Allergen Reactions    Shellfish-Derived Products - Food Allergy Swelling     shrimp       Review of Systems:  Review of Systems   Constitutional: Negative  HENT: Negative  Eyes: Negative  Respiratory: Negative  Cardiovascular: Negative  Gastrointestinal: Negative  Nausea during period    Endocrine: Negative  Genitourinary: Negative  Musculoskeletal: Negative  Skin: Negative  Allergic/Immunologic: Positive for food allergies  Neurological: Negative  Hematological: Negative  Psychiatric/Behavioral: Negative  Vitals:    09/14/21 1435   BP: 111/76   BP Location: Left arm   Patient Position: Sitting   Cuff Size: Large   Pulse: 79   Resp: 16   Temp: (!) 97 1 °F (36 2 °C)   TempSrc: Temporal   Weight: 59 5 kg (131 lb 3 2 oz)        Pain Score: 0-No pain        Imaging:No results found      Teaching

## 2021-09-14 NOTE — PROGRESS NOTES
Follow-up - Radiation Oncology   Mike Lyons 1975 51 y o  female [id number]      History of Present Illness   Cancer Staging  History of ductal carcinoma in situ (DCIS) of breast  Staging form: Breast, AJCC 8th Edition  - Pathologic: Stage Unknown (pTis (DCIS), pNX, cM0, G3, ER+, KS+, HER2: Not Assessed) - Unsigned  Neoadjuvant therapy: No  Histologic grading system: 3 grade system  Laterality: Right  Tumor size (mm): 17      Mike Lyons is a 51y o  year old female with a history of right breast ductal carcinoma s/p lumpectomy and radiation which completed 6/11/19  She was last seen by radiation oncology on 08/04/20  She is seen today for her 1 year follow up      Interval History:  08/04/20 - [facility]urg [facility]  Pt declined hormonal therapy  Pt has extremely dense breast tissue, JS genetic mutation and hx of breast cancer, will do MRI annually in addition to annual 3D mammogram  Encouraged pt to discuss ovarian surveillance with gynecologist      96/53/78 - Haider MACK / imaging not recommeded due to limited efficacy      08/31/20 - MRI breast bilateral w and wo contrast w cad  There are no suspicious enhancing masses or suspicious areas of nonmass enhancement  There is no axillary or internal mammary adenopathy    Post lumpectomy changes are noted in the right breast   IMPRESSION:  No evidence of malignancy  ASSESSMENT/BI-RADS CATEGORY:  Left: 1 - Negative  Right: 2 - Benign  Overall: 2 - Benign     02/24/21 - Mammo diagnostic bilateral w 3d & cad  IMPRESSION:   Therapeutic changes right breast   No evidence for malignancy  This patient has been identified as being at elevated risk for development of breast cancer based on the Tyrer-Cuzick model  She may benefit from supplemental screening    ASSESSMENT/BI-RADS CATEGORY:  Left: 1 - Negative  Right: 2 - Benign  Overall: 2 - Benign     03/03/21 - Surg Onc, [facility]  No complaints, no concerns       She reports she is feeling well   She denies any breast masses bilaterally  She denies any pain in the bilateral breast regions  She continues to apply moisturizer and massage the treated right breast on a daily basis  She has been working from home due to the coronavirus outbreak  She has no gyn complaints  She has her annual gyn examination scheduled for 2021  Her father  in  but her mother is alive and well and without any evidence of breast cancer  Upcomin21 - MRI breast bilateral w and wo contrast w cad  21 - Surg Onc, [facility]  21  -  Gyn Dr Consuelo Farooq   Oncology History   History of ductal carcinoma in situ (DCIS) of breast   2019 Biopsy    Right breast stereotatic biopsy  11 o'clock, anterior 3rd of the breast  Ductal carcinoma in situ  Grade 2  ER 90  NY 20    Concordant     3/14/2019 Genetic Testing    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  [facility]     2019 Surgery    Right lumpectomy  Ductal carcinoma in situ  1 7 cm  Grade 3  Margins negative  Stage 0     2019 - 2019 Hormone Therapy    Declined hormone therapy  Consult with Victorino Gonsales     2019 - 2019 Radiation    Dr Best Paige  Course: C1  Right breast, 16 fractions, total dose: 4256 cGy  Right breast cone down, 5 fractions, total dose: 1000 cGy         Past Medical History:   Diagnosis Date    BRCA negative 2019    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; [facility]    Breast cancer ([facility] ) 2019    right breast    Cancer ([facility] ) 2019    breast    Female infertility     Fibrocystic breast     History of radiation therapy 2019    Right WBRT    Recurrent pregnancy loss, antepartum condition or complication     x 4     Past Surgical History:   Procedure Laterality Date    BREAST BIOPSY      BREAST LUMPECTOMY Right 2019    Procedure: BREAST LUMPECTOMY; BREAST NEEDLE LOCALIZATION (NEEDLE LOC AT 0800);   Surgeon: Edwar Downing MD;  [facility];  Service: Surgical Oncology   SECTION      INDUCED       x3    MAMMO NEEDLE LOCALIZATION RIGHT (ALL INC) Right 2019    MAMMO STEREOTACTIC BREAST BIOPSY RIGHT (ALL INC) Right 2019       Social History   Social History     Substance and Sexual Activity   Alcohol Use Not Currently    Comment: rarely     Social History     Substance and Sexual Activity   Drug Use No     Social History     Tobacco Use   Smoking Status Never Smoker   Smokeless Tobacco Never Used     Meds/Allergies     Current Outpatient Medications:     multivitamin (THERAGRAN) TABS, Take 1 tablet by mouth daily, Disp: , Rfl:   Allergies   Allergen Reactions    Shellfish-Derived Products - Food Allergy Swelling     shrimp     Review of Systems   Constitutional: Negative  HENT: Negative  Eyes: Negative  Respiratory: Negative  Cardiovascular: Negative  Gastrointestinal: Negative  Nausea during period    Endocrine: Negative  Genitourinary: Negative  Musculoskeletal: Negative  Skin: Negative  Allergic/Immunologic: Positive for food allergies  Neurological: Negative  Hematological: Negative  Psychiatric/Behavioral: Negative        OBJECTIVE:   /76 (BP Location: Left arm, Patient Position: Sitting, Cuff Size: Large)   Pulse 79   Temp (!) 97 1 °F (36 2 °C) (Temporal)   Resp 16   Wt 59 5 kg (131 lb 3 2 oz)   BMI 24 79 kg/m²   Pain Assessment:  0  ECOG/Zubrod/WHO: 0 - Asymptomatic    Physical Exam   Constitutional: She is oriented to person, place, and time  She appears well-developed and well-nourished  No distress  HENT:   Head: Normocephalic and atraumatic  Mouth/Throat: No oropharyngeal exudate  Eyes: Pupils are equal, round, and reactive to light  Conjunctivae and EOM are normal  No scleral icterus  Neck: Normal range of motion  Neck supple  No tracheal deviation present  No thyromegaly present  Cardiovascular: Normal rate, regular rhythm and normal heart sounds     Pulmonary/Chest: Effort normal and breath sounds normal  No respiratory distress  She has no wheezes  She has no rales  She exhibits no tenderness  Right breast exhibits no inverted nipple, no mass, no nipple discharge, no skin change ( She has a well-healed right breast superior circumareolar incision with no underlying masses   She has minimal skin hyperpigmentation and post radiation changes ) and no tenderness  Left breast exhibits no inverted nipple, no mass, no nipple discharge, no skin change and no tenderness  Abdominal: Soft  Bowel sounds are normal  She exhibits no distension and no mass  There is no tenderness  Musculoskeletal: Normal range of motion  She exhibits no edema or tenderness  Lymphadenopathy:     She has no cervical adenopathy      She has no axillary adenopathy         Right: No supraclavicular adenopathy present         Left: No supraclavicular adenopathy present  Neurological: She is alert and oriented to person, place, and time  No cranial nerve deficit  Coordination normal    Skin: Skin is warm and dry  No rash noted  She is not diaphoretic  No erythema  No pallor  Psychiatric: She has a normal mood and affect  Her behavior is normal  Judgment and thought content normal    Nursing note and vitals reviewed  RESULTS    Lab Results:   Recent Results (from the past 672 hour(s))   N[facility]COVID-[facility]    Collection Time: 08/24/21 12:10 PM   Result Value Ref Range    SARS-CoV-2 NOT DETECTED NOT DETECTED       Imaging Studies: See Above    Assessment/Plan:  No orders of the defined types were placed in this encounter  Taniya Klein is a 51y o  year old female with a right breast ductal carcinoma in situ, grade 3 that was ER positive at 80 percent and WV positive at 20 percent   She is s/p lumpectomy with negative margins on April 2, 2019   We recommended radiation therapy to the entire right breast  She elected not to take Tamoxifen   She completed radiation therapy on June 11, 2019 and returns today for follow-up visit  Serjio Leonardo doing well today   Clinical examination today reveals minimal right breast post treatment changes without any masses  Collene Goldmann will continue to massage the breast daily and apply Remedy cream    She had a negative mammogram on February 21, 2020 and February 24, 2021  She will have a repeat MRI of the breast on September 17, 2021 and then see surgical oncology on September 23, 2021   She will return here for follow-up in 12 months          Kaylee Berrios MD  9/14/2021,2:54 PM    Portions of the record may have been created with voice recognition software   Occasional wrong word or "sound a like" substitutions may have occurred due to the inherent limitations of voice recognition software   Read the chart carefully and recognize, using context, where substitutions have occurred

## 2021-09-17 ENCOUNTER — HOSPITAL ENCOUNTER (OUTPATIENT)
Dept: RADIOLOGY | Facility: HOSPITAL | Age: 46
Discharge: HOME | End: 2021-09-17
Payer: COMMERCIAL

## 2021-09-17 DIAGNOSIS — Z15.01 MONOALLELIC MUTATION OF ATM GENE: ICD-10-CM

## 2021-09-17 DIAGNOSIS — Z86.000 HISTORY OF DUCTAL CARCINOMA IN SITU (DCIS) OF BREAST: ICD-10-CM

## 2021-09-17 DIAGNOSIS — Z15.09 MONOALLELIC MUTATION OF ATM GENE: ICD-10-CM

## 2021-09-17 DIAGNOSIS — Z15.89 MONOALLELIC MUTATION OF ATM GENE: ICD-10-CM

## 2021-09-17 DIAGNOSIS — R92.2 DENSE BREASTS: ICD-10-CM

## 2021-09-17 PROCEDURE — G1004 CDSM NDSC: HCPCS

## 2021-09-17 PROCEDURE — C8908 MRI W/O FOL W/CONT, BREAST,: HCPCS

## 2021-09-17 PROCEDURE — C8937 CAD BREAST MRI: HCPCS

## 2021-09-17 PROCEDURE — A9585 GADOBUTROL INJECTION: HCPCS | Performed by: NURSE PRACTITIONER

## 2021-09-17 RX ADMIN — GADOBUTROL 6 ML: 604.72 INJECTION INTRAVENOUS at 14:29

## 2021-09-20 ENCOUNTER — TELEPHONE (OUTPATIENT)
Dept: HEMATOLOGY ONCOLOGY | Facility: CLINIC | Age: 46
End: 2021-09-20

## 2021-09-20 ENCOUNTER — TELEPHONE (OUTPATIENT)
Dept: SURGICAL ONCOLOGY | Facility: CLINIC | Age: 46
End: 2021-09-20

## 2021-09-20 DIAGNOSIS — R92.8 ABNORMAL MRI, BREAST: Primary | ICD-10-CM

## 2021-09-20 NOTE — TELEPHONE ENCOUNTER
Spoke with patient and reviewed results of recent breast MRI  There are 2 areas of non mass enhancement in the left breast and a 2 site MRI guided biopsy was recommended  Order placed  I will postpone my previously scheduled visit with the patient until after the results are available  Patient is in agreement with these plans and all of her questions were answered today

## 2021-09-20 NOTE — TELEPHONE ENCOUNTER
Patient called to see if sooner appt  Was available for MRI guided breast biopsy that was scheduled on Oct  15  Central Scheduling was called and was told that nothing sooner was available  They are only done on Fri  Morning at [facility] office  Only certain physicians perform this procedure  Patient will call back to see if any cancellations have left an open appt  Stable

## 2021-09-20 NOTE — TELEPHONE ENCOUNTER
Patient went for lab work on Friday ordered by Sheba Darnell and got results back but nothing pending in our system  Patient stated she went to [facility] in [facility] and would like to discuss results  Patient could best be reached at [phone]

## 2021-09-21 ENCOUNTER — TELEPHONE (OUTPATIENT)
Dept: SURGICAL ONCOLOGY | Facility: CLINIC | Age: 46
End: 2021-09-21

## 2021-10-15 ENCOUNTER — TELEPHONE (OUTPATIENT)
Dept: SURGICAL ONCOLOGY | Facility: CLINIC | Age: 46
End: 2021-10-15

## 2021-10-15 ENCOUNTER — HOSPITAL ENCOUNTER (OUTPATIENT)
Dept: RADIOLOGY | Facility: HOSPITAL | Age: 46
Discharge: HOME | End: 2021-10-15
Payer: COMMERCIAL

## 2021-10-15 DIAGNOSIS — R92.8 ABNORMAL MRI, BREAST: ICD-10-CM

## 2021-10-15 PROCEDURE — A4648 IMPLANTABLE TISSUE MARKER: HCPCS

## 2021-10-15 PROCEDURE — 88305 TISSUE EXAM BY PATHOLOGIST: CPT | Performed by: PATHOLOGY

## 2021-10-15 PROCEDURE — 88342 IMHCHEM/IMCYTCHM 1ST ANTB: CPT | Performed by: PATHOLOGY

## 2021-10-15 PROCEDURE — 88341 IMHCHEM/IMCYTCHM EA ADD ANTB: CPT | Performed by: PATHOLOGY

## 2021-10-15 PROCEDURE — A9585 GADOBUTROL INJECTION: HCPCS | Performed by: NURSE PRACTITIONER

## 2021-10-15 PROCEDURE — 19085 BX BREAST 1ST LESION MR IMAG: CPT

## 2021-10-15 RX ORDER — LIDOCAINE HYDROCHLORIDE AND EPINEPHRINE BITARTRATE 20; .01 MG/ML; MG/ML
20 INJECTION, SOLUTION SUBCUTANEOUS ONCE
Status: COMPLETED | OUTPATIENT
Start: 2021-10-15 | End: 2021-10-15

## 2021-10-15 RX ORDER — LIDOCAINE HYDROCHLORIDE 10 MG/ML
5 INJECTION, SOLUTION EPIDURAL; INFILTRATION; INTRACAUDAL; PERINEURAL ONCE
Status: COMPLETED | OUTPATIENT
Start: 2021-10-15 | End: 2021-10-15

## 2021-10-15 RX ADMIN — LIDOCAINE HYDROCHLORIDE 5 ML: 10 INJECTION, SOLUTION EPIDURAL; INFILTRATION; INTRACAUDAL; PERINEURAL at 09:03

## 2021-10-15 RX ADMIN — LIDOCAINE HYDROCHLORIDE,EPINEPHRINE BITARTRATE 20 ML: 20; .01 INJECTION, SOLUTION INFILTRATION; PERINEURAL at 09:05

## 2021-10-15 RX ADMIN — LIDOCAINE HYDROCHLORIDE 5 ML: 10 INJECTION, SOLUTION EPIDURAL; INFILTRATION; INTRACAUDAL; PERINEURAL at 09:04

## 2021-10-15 RX ADMIN — LIDOCAINE HYDROCHLORIDE,EPINEPHRINE BITARTRATE 20 ML: 20; .01 INJECTION, SOLUTION INFILTRATION; PERINEURAL at 09:04

## 2021-10-15 RX ADMIN — GADOBUTROL 6 ML: 604.72 INJECTION INTRAVENOUS at 08:33

## 2021-10-21 ENCOUNTER — OFFICE VISIT (OUTPATIENT)
Dept: SURGICAL ONCOLOGY | Facility: CLINIC | Age: 46
End: 2021-10-21
Payer: COMMERCIAL

## 2021-10-21 VITALS
WEIGHT: 130.5 LBS | HEART RATE: 86 BPM | SYSTOLIC BLOOD PRESSURE: 104 MMHG | DIASTOLIC BLOOD PRESSURE: 64 MMHG | HEIGHT: 61 IN | BODY MASS INDEX: 24.64 KG/M2 | RESPIRATION RATE: 18 BRPM | TEMPERATURE: 98.5 F | OXYGEN SATURATION: 97 %

## 2021-10-21 DIAGNOSIS — Z15.89 MONOALLELIC MUTATION OF ATM GENE: ICD-10-CM

## 2021-10-21 DIAGNOSIS — Z08 ENCOUNTER FOR FOLLOW-UP EXAMINATION AFTER COMPLETED TREATMENT FOR MALIGNANT NEOPLASM: Primary | ICD-10-CM

## 2021-10-21 DIAGNOSIS — Z15.09 MONOALLELIC MUTATION OF ATM GENE: ICD-10-CM

## 2021-10-21 DIAGNOSIS — Z15.01 MONOALLELIC MUTATION OF ATM GENE: ICD-10-CM

## 2021-10-21 DIAGNOSIS — Z86.000 HISTORY OF DUCTAL CARCINOMA IN SITU (DCIS) OF BREAST: ICD-10-CM

## 2021-10-21 PROCEDURE — 99213 OFFICE O/P EST LOW 20 MIN: CPT | Performed by: NURSE PRACTITIONER

## 2021-11-02 ENCOUNTER — TELEPHONE (OUTPATIENT)
Dept: GENETICS | Facility: CLINIC | Age: 46
End: 2021-11-02

## 2021-11-05 ENCOUNTER — ANNUAL EXAM (OUTPATIENT)
Dept: OBGYN CLINIC | Facility: CLINIC | Age: 46
End: 2021-11-05
Payer: COMMERCIAL

## 2021-11-05 VITALS
WEIGHT: 130 LBS | BODY MASS INDEX: 23.04 KG/M2 | DIASTOLIC BLOOD PRESSURE: 60 MMHG | HEIGHT: 63 IN | SYSTOLIC BLOOD PRESSURE: 112 MMHG

## 2021-11-05 DIAGNOSIS — Z12.11 COLON CANCER SCREENING: ICD-10-CM

## 2021-11-05 DIAGNOSIS — Z01.419 WOMEN'S ANNUAL ROUTINE GYNECOLOGICAL EXAMINATION: Primary | ICD-10-CM

## 2021-11-05 PROCEDURE — 3008F BODY MASS INDEX DOCD: CPT | Performed by: OBSTETRICS & GYNECOLOGY

## 2021-11-05 PROCEDURE — 99396 PREV VISIT EST AGE 40-64: CPT | Performed by: OBSTETRICS & GYNECOLOGY

## 2021-12-07 ENCOUNTER — TELEPHONE (OUTPATIENT)
Dept: SURGICAL ONCOLOGY | Facility: CLINIC | Age: 46
End: 2021-12-07

## 2021-12-14 ENCOUNTER — IMMUNIZATIONS (OUTPATIENT)
Dept: FAMILY MEDICINE CLINIC | Facility: HOSPITAL | Age: 46
End: 2021-12-14

## 2021-12-14 DIAGNOSIS — Z23 ENCOUNTER FOR IMMUNIZATION: Primary | ICD-10-CM

## 2021-12-14 PROCEDURE — 91300 COVID-19 PFIZER VACC 0.3 ML: CPT

## 2021-12-14 PROCEDURE — 0001A COVID-19 PFIZER VACC 0.3 ML: CPT

## 2021-12-17 ENCOUNTER — TELEPHONE (OUTPATIENT)
Dept: GENETICS | Facility: CLINIC | Age: 46
End: 2021-12-17

## 2022-02-25 ENCOUNTER — HOSPITAL ENCOUNTER (OUTPATIENT)
Dept: RADIOLOGY | Facility: HOSPITAL | Age: 47
Discharge: HOME | End: 2022-02-25
Payer: COMMERCIAL

## 2022-02-25 VITALS — BODY MASS INDEX: 22.32 KG/M2 | WEIGHT: 126 LBS | HEIGHT: 63 IN

## 2022-02-25 DIAGNOSIS — Z86.000 HISTORY OF DUCTAL CARCINOMA IN SITU (DCIS) OF BREAST: ICD-10-CM

## 2022-02-25 PROCEDURE — G0279 TOMOSYNTHESIS, MAMMO: HCPCS

## 2022-02-25 PROCEDURE — 77066 DX MAMMO INCL CAD BI: CPT

## 2022-03-07 ENCOUNTER — OFFICE VISIT (OUTPATIENT)
Dept: GASTROENTEROLOGY | Facility: AMBULARY SURGERY CENTER | Age: 47
End: 2022-03-07
Payer: COMMERCIAL

## 2022-03-07 VITALS
HEART RATE: 84 BPM | RESPIRATION RATE: 18 BRPM | DIASTOLIC BLOOD PRESSURE: 68 MMHG | SYSTOLIC BLOOD PRESSURE: 112 MMHG | WEIGHT: 131.4 LBS | OXYGEN SATURATION: 100 % | HEIGHT: 62 IN | BODY MASS INDEX: 24.18 KG/M2

## 2022-03-07 DIAGNOSIS — Z12.11 COLON CANCER SCREENING: Primary | ICD-10-CM

## 2022-03-07 PROBLEM — K59.09 OTHER CONSTIPATION: Status: RESOLVED | Noted: 2019-06-11 | Resolved: 2022-03-07

## 2022-03-07 PROCEDURE — 3008F BODY MASS INDEX DOCD: CPT | Performed by: INTERNAL MEDICINE

## 2022-03-07 PROCEDURE — 99203 OFFICE O/P NEW LOW 30 MIN: CPT | Performed by: INTERNAL MEDICINE

## 2022-03-07 RX ORDER — ONDANSETRON 4 MG/1
4 TABLET, FILM COATED ORAL EVERY 8 HOURS PRN
Qty: 20 TABLET | Refills: 0 | Status: SHIPPED | OUTPATIENT
Start: 2022-03-07 | End: 2022-04-04 | Stop reason: ALTCHOICE

## 2022-03-07 RX ORDER — POLYETHYLENE GLYCOL 3350 17 G/17G
POWDER, FOR SOLUTION ORAL
Qty: 238 G | Refills: 0 | Status: SHIPPED | OUTPATIENT
Start: 2022-03-07 | End: 2022-04-04 | Stop reason: ALTCHOICE

## 2022-03-07 NOTE — LETTER
2022     [address] [place] [address]    Patient: Phuong Cardenas   YOB: 1975   Date of Visit: 3/7/2022       Dear Dr Divya Rodriguez:    Thank you for referring Phuong Cardenas to me for evaluation  Below are my notes for this consultation  If you have questions, please do not hesitate to call me  I look forward to following your patient along with you  Sincerely,        Jen Espino MD        CC: No Recipients  Jen Espino MD  3/7/2022  4:25 PM  Signed  [facility] - Outpatient Consultation  Phuong Cardenas 51 y o  female MRN: [id number]  Encounter: [id number]      PCP: Yousuf Coleman MD  [address] [place] [address]      ASSESSMENT AND PLAN:      1  Colon cancer screening  Average risk, index screening colonoscopy  - Ambulatory referral to Gastroenterology  - ondansetron (ZOFRAN) 4 mg tablet; Take 1 tablet (4 mg total) by mouth every 8 (eight) hours as needed for nausea or vomiting  Dispense: 20 tablet; Refill: 0  - Colonoscopy; Future  - polyethylene glycol (GLYCOLAX) 17 GM/SCOOP powder; At 5pm take 5mgx2 dulcolax  At 6pm mix 238 g miralax in 64oz gatorade  Drink 8oz glass every 5 mins until 32oz finished  Drink remaining as rec  Dispense: 238 g; Refill: 0  - clenpiq sample given      ______________________________________________________________________    CC:  Chief Complaint   Patient presents with    Screening Colonoscopy       HPI:      Patient is a 50-year-old female referred for colon cancer screening  She has DCIS breast cancer 2019 s/p right lumpectomy and adjuvant radiation, vitamin D deficiency, carpal tunnel syndrome  She has never previously had a colonoscopy  She denies chronic abdominal pain, irregular bowel habits, GERD, dysphagia    Her paternal uncle had colon cancer diagnosed in his 62s; no first-degree relatives with colon cancer  She is concerned about taking the bowel preparation due to gagging with large amounts of liquids  REVIEW OF SYSTEMS:    CONSTITUTIONAL: Denies any fever, chills, rigors, and weight loss  HEENT: No earache or tinnitus  Denies hearing loss or visual disturbances  CARDIOVASCULAR: No chest pain or palpitations  RESPIRATORY: Denies any cough, hemoptysis, shortness of breath or dyspnea on exertion  GASTROINTESTINAL: As noted in the History of Present Illness  GENITOURINARY: No problems with urination  Denies any hematuria or dysuria  NEUROLOGIC: No dizziness or vertigo, denies headaches  MUSCULOSKELETAL: Denies any muscle or joint pain  SKIN: Denies skin rashes or itching  ENDOCRINE: Denies excessive thirst  Denies intolerance to heat or cold  PSYCHOSOCIAL: Denies depression or anxiety  Denies any recent memory loss  Historical Information   Past Medical History:   Diagnosis Date    BRCA negative 2019    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; [facility]    [facility] 2019    right breast    [facility]    breast    Female infertility     Fibrocystic breast     History of radiation therapy 2019    Right WBRT    Recurrent pregnancy loss, antepartum condition or complication     x 4     Past Surgical History:   Procedure Laterality Date    BREAST BIOPSY      BREAST LUMPECTOMY Right 2019    Procedure: BREAST LUMPECTOMY; BREAST NEEDLE LOCALIZATION (NEEDLE LOC AT 0800);   Surgeon: Rubina Wagner MD;  [facility];  Service: Surgical Oncology     SECTION      INDUCED       x3    MAMMO NEEDLE LOCALIZATION RIGHT (ALL INC) Right 2019    MAMMO STEREOTACTIC BREAST BIOPSY RIGHT (ALL INC) Right 2019    MRI BREAST BIOPSY LEFT (ALL INCLUSIVE) Left 10/15/2021     Social History   Social History     Substance and Sexual Activity   Alcohol Use Not Currently    Comment: rarely     Social History     Substance and Sexual Activity   Drug Use Never     Social History     Tobacco Use   Smoking Status Never Smoker   Smokeless Tobacco Never Used     Family History   Problem Relation Age of Onset    No Known Problems Mother     No Known Problems Father     No Known Problems Sister     Diabetes Brother     Hypertension Brother     No Known Problems Son     Breast cancer Maternal Grandmother 39    Diabetes Maternal Grandmother     Heart disease Maternal Grandfather     Diabetes Maternal Grandfather     Breast cancer Paternal Grandmother 39    Diabetes Paternal Grandfather     Heart disease Paternal Grandfather     Hypertension Paternal Grandfather     No Known Problems Sister     Heart attack Maternal Aunt        Meds/Allergies       Current Outpatient Medications:     multivitamin (THERAGRAN) TABS    Allergies   Allergen Reactions    Shellfish-Derived Products - Food Allergy Swelling     shrimp           Objective     Blood pressure 112/68, pulse 84, resp  rate 18, height 5' 2" (1 575 m), weight 59 6 kg (131 lb 6 4 oz), last menstrual period 02/10/2022, SpO2 100 %, not currently breastfeeding  Body mass index is 24 03 kg/m²  PHYSICAL EXAM:      General Appearance:   Alert, cooperative, no distress   HEENT:   Normocephalic, atraumatic, anicteric  Neck:  Supple, symmetrical, trachea midline   Lungs:   Clear to auscultation bilaterally; no rales, rhonchi or wheezing; respirations unlabored    Heart[de-identified]   Regular rate and rhythm; no murmur, rub, or gallop     Abdomen:   Soft, non-tender, non-distended; normal bowel sounds; no masses, no organomegaly    Genitalia:   Deferred    Rectal:   Deferred    Extremities:  No cyanosis, clubbing or edema    Pulses:  2+ and symmetric    Skin:  No jaundice, rashes, or lesions    Lymph nodes:  No palpable cervical lymphadenopathy        Lab Results:     Lab Results   Component Value Date    WBC 7 2 06/12/2021    HGB 12 9 06/12/2021    HCT 40 2 06/12/2021    MCV 89 06/12/2021  06/12/2021       Lab Results   Component Value Date     03/18/2016    K 4 4 06/12/2021     06/12/2021    CO2 26 06/12/2021    BUN 6 06/12/2021    CREATININE 0 74 06/12/2021    CALCIUM 9 6 03/18/2016    AST 18 06/12/2021    ALT 15 06/12/2021    ALKPHOS 57 03/18/2016    PROT 7 2 03/18/2016    BILITOT 0 5 03/18/2016       No results found for: INR, PROTIME      Radiology Results:   Mammo diagnostic bilateral w 3d & cad    Result Date: 2/25/2022  Narrative: DIAGNOSIS: History of ductal carcinoma in situ (DCIS) of breast TECHNIQUE: Digital screening mammography was performed  Computer Aided Detection (CAD) analyzed all applicable images  COMPARISONS: Prior breast imaging dated: 10/15/2021, 09/17/2021, 02/24/2021, 08/31/2020, 02/21/2020, 04/02/2019, 04/02/2019, 02/25/2019, 02/25/2019, 02/20/2019, 02/06/2019, 05/26/2016, 05/26/2016, 05/26/2016, 09/18/2015, and 08/11/2014 RELEVANT HISTORY: Family Breast Cancer History: History of breast cancer in Maternal Grandmother, Paternal Grandmother  Family Medical History: Family medical history includes breast cancer in 2 relatives (maternal grandmother, paternal grandmother)  Personal History: No known relevant hormone history  Surgical history includes breast biopsy and lumpectomy  Medical history includes fibrocystic breast and breast cancer  RISK ASSESSMENT: 5 Year Tyrer-Cuzick: 1 95 % 10 Year Tyrer-Cuzick: 4 41 % Lifetime Tyrer-Cuzick: 22 73 % TISSUE DENSITY: The breasts are extremely dense, which lowers the sensitivity of mammography  INDICATION: King Aravind is a 51 y o  female presenting for annual  FINDINGS: RIGHT B) POST-SURGICAL FINDING: There are post-surgical findings from a previous lumpectomy with radiation seen in the retroareolar region of the right breast  Compared to the previous study, there are no significant changes  Left There are no suspicious masses, grouped microcalcifications or areas of unexplained architectural distortion  The skin and nipple areolar complex are unremarkable  Biopsy clips again noted  Impression:  Stable therapeutic changes right breast   No evidence for malignancy  This patient has been identified as being at elevated risk for development of breast cancer based on the Tyrer-Cuzick model  She may benefit from supplemental screening  ASSESSMENT/BI-RADS CATEGORY: Left: 2 - Benign Right: 2 - Benign Overall: 2 - Benign RECOMMENDATION:      - Diagnostic mammogram in 1 year for both breasts  Workstation ID: [id number]      Portions of the record may have been created with voice recognition software  Occasional wrong word or "sound a like" substitutions may have occurred due to the inherent limitations of voice recognition software  Read the chart carefully and recognize, using context, where substitutions have occurred

## 2022-03-07 NOTE — PATIENT INSTRUCTIONS
Check with your pharmacist about insurance coverage of the bowel preparations-     - miralax with gatorade (instructions provided)  - suprep (2 x 16 oz bottles)  - sutab (24 pill-based prep)  Scheduled date of colonoscopy (as of today): 5/31/2022  Physician performing colonoscopy:DR REDDY  Location of colonoscopy:[facility]  Bowel prep reviewed with patient:JIM REDDY/SAMPLE GIVEN  Instructions reviewed with patient by:EVELYN BOYER  Clearances:

## 2022-03-07 NOTE — PROGRESS NOTES
[facility] - Outpatient Consultation  Vicente Aguirre 51 y o  female MRN: [id number]  Encounter: [id number]      PCP: Irish Wells MD  Referring: Joleen Tang DO  [address] [facility] [address]  [place],  [address] [facility]      ASSESSMENT AND PLAN:      1  Colon cancer screening  Average risk, index screening colonoscopy  - Ambulatory referral to Gastroenterology  - ondansetron (ZOFRAN) 4 mg tablet; Take 1 tablet (4 mg total) by mouth every 8 (eight) hours as needed for nausea or vomiting  Dispense: 20 tablet; Refill: 0  - Colonoscopy; Future  - polyethylene glycol (GLYCOLAX) 17 GM/SCOOP powder; At 5pm take 5mgx2 dulcolax  At 6pm mix 238 g miralax in 64oz gatorade  Drink 8oz glass every 5 mins until 32oz finished  Drink remaining as rec  Dispense: 238 g; Refill: 0  - clenpiq sample given      ______________________________________________________________________    CC:  Chief Complaint   Patient presents with    Screening Colonoscopy       HPI:      Patient is a 50-year-old female referred for colon cancer screening  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, vitamin D deficiency, carpal tunnel syndrome  She has never previously had a colonoscopy  She denies chronic abdominal pain, irregular bowel habits, GERD, dysphagia  Her paternal uncle had colon cancer diagnosed in his 62s; no first-degree relatives with colon cancer  She is concerned about taking the bowel preparation due to gagging with large amounts of liquids  REVIEW OF SYSTEMS:    CONSTITUTIONAL: Denies any fever, chills, rigors, and weight loss  HEENT: No earache or tinnitus  Denies hearing loss or visual disturbances  CARDIOVASCULAR: No chest pain or palpitations  RESPIRATORY: Denies any cough, hemoptysis, shortness of breath or dyspnea on exertion  GASTROINTESTINAL: As noted in the History of Present Illness  GENITOURINARY: No problems with urination  Denies any hematuria or dysuria  NEUROLOGIC: No dizziness or vertigo, denies headaches  MUSCULOSKELETAL: Denies any muscle or joint pain  SKIN: Denies skin rashes or itching  ENDOCRINE: Denies excessive thirst  Denies intolerance to heat or cold  PSYCHOSOCIAL: Denies depression or anxiety  Denies any recent memory loss  Historical Information   Past Medical History:   Diagnosis Date    BRCA negative 2019    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; [employer]    [facility] [address] ) 2019    right breast    [facility] 2019    breast    Female infertility     Fibrocystic breast     History of radiation therapy 2019    Right WBRT    Recurrent pregnancy loss, antepartum condition or complication     x 4     Past Surgical History:   Procedure Laterality Date    BREAST BIOPSY      BREAST LUMPECTOMY Right 2019    Procedure: BREAST LUMPECTOMY; BREAST NEEDLE LOCALIZATION (NEEDLE LOC AT 0800);   Surgeon: Dayana Ho MD;  [facility];  Service: Surgical Oncology     SECTION      INDUCED       x3    MAMMO NEEDLE LOCALIZATION RIGHT (ALL INC) Right 2019    MAMMO STEREOTACTIC BREAST BIOPSY RIGHT (ALL INC) Right 2019    MRI BREAST BIOPSY LEFT (ALL INCLUSIVE) Left 10/15/2021     Social History   Social History     Substance and Sexual Activity   Alcohol Use Not Currently    Comment: rarely     Social History     Substance and Sexual Activity   Drug Use Never     Social History     Tobacco Use   Smoking Status Never Smoker   Smokeless Tobacco Never Used     Family History   Problem Relation Age of Onset    No Known Problems Mother     No Known Problems Father     No Known Problems Sister     Diabetes Brother     Hypertension Brother     No Known Problems Son     Breast cancer Maternal Grandmother 39    Diabetes Maternal Grandmother     Heart disease Maternal Grandfather     Diabetes Maternal Grandfather     Breast cancer Paternal Grandmother 39    Diabetes Paternal Grandfather     Heart disease Paternal Grandfather     Hypertension Paternal Grandfather     No Known Problems Sister     Heart attack Maternal Aunt        Meds/Allergies       Current Outpatient Medications:     multivitamin (THERAGRAN) TABS    Allergies   Allergen Reactions    Shellfish-Derived Products - Food Allergy Swelling     shrimp           Objective     Blood pressure 112/68, pulse 84, resp  rate 18, height 5' 2" (1 575 m), weight 59 6 kg (131 lb 6 4 oz), last menstrual period 02/10/2022, SpO2 100 %, not currently breastfeeding  Body mass index is 24 03 kg/m²  PHYSICAL EXAM:      General Appearance:   Alert, cooperative, no distress   HEENT:   Normocephalic, atraumatic, anicteric  Neck:  Supple, symmetrical, trachea midline   Lungs:   Clear to auscultation bilaterally; no rales, rhonchi or wheezing; respirations unlabored    Heart[de-identified]   Regular rate and rhythm; no murmur, rub, or gallop     Abdomen:   Soft, non-tender, non-distended; normal bowel sounds; no masses, no organomegaly    Genitalia:   Deferred    Rectal:   Deferred    Extremities:  No cyanosis, clubbing or edema    Pulses:  2+ and symmetric    Skin:  No jaundice, rashes, or lesions    Lymph nodes:  No palpable cervical lymphadenopathy        Lab Results:     Lab Results   Component Value Date    WBC 7 2 06/12/2021    HGB 12 9 06/12/2021    HCT 40 2 06/12/2021    MCV 89 06/12/2021     06/12/2021       Lab Results   Component Value Date     03/18/2016    K 4 4 06/12/2021     06/12/2021    CO2 26 06/12/2021    BUN 6 06/12/2021    CREATININE 0 74 06/12/2021    CALCIUM 9 6 03/18/2016    AST 18 06/12/2021    ALT 15 06/12/2021    ALKPHOS 57 03/18/2016    PROT 7 2 03/18/2016    BILITOT 0 5 03/18/2016       No results found for: INR, PROTIME      Radiology Results:   Mammo diagnostic bilateral w 3d & cad    Result Date: 2/25/2022  Narrative: DIAGNOSIS: History of ductal carcinoma in situ (DCIS) of breast TECHNIQUE: Digital screening mammography was performed  Computer Aided Detection (CAD) analyzed all applicable images  COMPARISONS: Prior breast imaging dated: 10/15/2021, 09/17/2021, 02/24/2021, 08/31/2020, 02/21/2020, 04/02/2019, 04/02/2019, 02/25/2019, 02/25/2019, 02/20/2019, 02/06/2019, 05/26/2016, 05/26/2016, 05/26/2016, 09/18/2015, and 08/11/2014 RELEVANT HISTORY: Family Breast Cancer History: History of breast cancer in Maternal Grandmother, Paternal Grandmother  Family Medical History: Family medical history includes breast cancer in 2 relatives (maternal grandmother, paternal grandmother)  Personal History: No known relevant hormone history  Surgical history includes breast biopsy and lumpectomy  Medical history includes fibrocystic breast and breast cancer  RISK ASSESSMENT: 5 Year Tyrer-Cuzick: 1 95 % 10 Year Tyrer-Cuzick: 4 41 % Lifetime Tyrer-Cuzick: 22 73 % TISSUE DENSITY: The breasts are extremely dense, which lowers the sensitivity of mammography  INDICATION: Romayne Doyne is a 51 y o  female presenting for annual  FINDINGS: RIGHT B) POST-SURGICAL FINDING: There are post-surgical findings from a previous lumpectomy with radiation seen in the retroareolar region of the right breast  Compared to the previous study, there are no significant changes  Left There are no suspicious masses, grouped microcalcifications or areas of unexplained architectural distortion  The skin and nipple areolar complex are unremarkable  Biopsy clips again noted  Impression:  Stable therapeutic changes right breast   No evidence for malignancy  This patient has been identified as being at elevated risk for development of breast cancer based on the Tyrer-Cuzick model  She may benefit from supplemental screening  ASSESSMENT/BI-RADS CATEGORY: Left: 2 - Benign Right: 2 - Benign Overall: 2 - Benign RECOMMENDATION:      - Diagnostic mammogram in 1 year for both breasts   Workstation ID: [id number]      Portions of the record may have been created with voice recognition software  Occasional wrong word or "sound a like" substitutions may have occurred due to the inherent limitations of voice recognition software  Read the chart carefully and recognize, using context, where substitutions have occurred

## 2022-04-04 ENCOUNTER — OFFICE VISIT (OUTPATIENT)
Dept: INTERNAL MEDICINE CLINIC | Facility: CLINIC | Age: 47
End: 2022-04-04
Payer: COMMERCIAL

## 2022-04-04 VITALS
HEART RATE: 83 BPM | OXYGEN SATURATION: 100 % | BODY MASS INDEX: 23.92 KG/M2 | WEIGHT: 130 LBS | DIASTOLIC BLOOD PRESSURE: 74 MMHG | HEIGHT: 62 IN | TEMPERATURE: 98.4 F | SYSTOLIC BLOOD PRESSURE: 118 MMHG

## 2022-04-04 DIAGNOSIS — G44.209 TENSION HEADACHE: ICD-10-CM

## 2022-04-04 DIAGNOSIS — F43.0 PANIC ATTACK AS REACTION TO STRESS: ICD-10-CM

## 2022-04-04 DIAGNOSIS — F41.9 ANXIETY: Primary | ICD-10-CM

## 2022-04-04 DIAGNOSIS — F41.0 PANIC ATTACK AS REACTION TO STRESS: ICD-10-CM

## 2022-04-04 PROCEDURE — 99213 OFFICE O/P EST LOW 20 MIN: CPT | Performed by: INTERNAL MEDICINE

## 2022-04-04 RX ORDER — BUSPIRONE HYDROCHLORIDE 5 MG/1
5 TABLET ORAL 3 TIMES DAILY PRN
Qty: 60 TABLET | Refills: 0 | Status: SHIPPED | OUTPATIENT
Start: 2022-04-04 | End: 2022-05-18 | Stop reason: SDUPTHER

## 2022-04-04 NOTE — ASSESSMENT & PLAN NOTE
Mostly due to stress at work  Discussed meditations and relaxation techniques  Given buspirone to take prn  Out of work note provided

## 2022-04-04 NOTE — PROGRESS NOTES
Assessment/Plan:    Anxiety  Mostly due to stress at work  Discussed meditations and relaxation techniques  Given buspirone to take prn  Out of work note provided  Tension headache  Declined muscle relaxant, may continue with ibuprofen or Tylenol prn  Instructed to start warm moist heat followed by stretching exercises  This was demonstrated to her  Diagnoses and all orders for this visit:    Anxiety  -     busPIRone (BUSPAR) 5 mg tablet; Take 1 tablet (5 mg total) by mouth 3 (three) times a day as needed (anxiety)    Tension headache    Panic attack as reaction to stress  Comments:  As above  Follow up in 2 months or as needed  Subjective:      Patient ID: Ernesto Garcia is a 51 y o  female c/o headache and anxiety  She reports intermittent headaches the past few weeks, worse in the past week  She started working 2 jobs in the same company since 3 weeks ago  She feels very overwhelmed, has been very stressed  She reports almost daily headaches, no nausea or vomiting  No aura, light sensitivity  She does have frequent upper back and neck pain  She takes Tylenol or ibuprofen as needed  She reports experiencing a panic attack 3 times this week  The most recent one she woke up in the middle the night and had an episode for a few minutes  She attempted to talk someone at work regarding her job  She feels very overwhelmed and unable to cope  She did not go to work today,  asked that she should be seen at the ER earlier today  The following portions of the patient's history were reviewed and updated as appropriate: allergies, current medications, past medical history, past social history and problem list     Review of Systems   Constitutional: Positive for fatigue  Negative for appetite change  Respiratory: Negative for cough and shortness of breath  Cardiovascular: Positive for palpitations  Negative for chest pain and leg swelling     Gastrointestinal: Negative for abdominal pain, diarrhea and nausea  Musculoskeletal: Positive for neck pain and neck stiffness  Neurological: Positive for headaches  Negative for dizziness, weakness and numbness  Psychiatric/Behavioral: Positive for sleep disturbance  Negative for confusion, self-injury and suicidal ideas  The patient is nervous/anxious  Objective:      /74   Pulse 83   Temp 98 4 °F (36 9 °C)   Ht 5' 2" (1 575 m)   Wt 59 kg (130 lb)   SpO2 100%   BMI 23 78 kg/m²          Physical Exam  Constitutional:       General: She is not in acute distress  Appearance: Normal appearance  She is not ill-appearing  HENT:      Head: Normocephalic and atraumatic  Nose: Nose normal       Mouth/Throat:      Mouth: Mucous membranes are moist    Eyes:      Pupils: Pupils are equal, round, and reactive to light  Cardiovascular:      Rate and Rhythm: Normal rate and regular rhythm  Pulmonary:      Effort: Pulmonary effort is normal  No respiratory distress  Breath sounds: Normal breath sounds  Musculoskeletal:      Cervical back: Spasms and tenderness present  Pain with movement present  Thoracic back: Spasms present  No tenderness  Neurological:      General: No focal deficit present  Mental Status: She is alert and oriented to person, place, and time  Psychiatric:         Attention and Perception: Attention normal          Mood and Affect: Mood is anxious  Mood is not depressed  Speech: Speech normal          Behavior: Behavior is not agitated  Thought Content: Thought content is not paranoid  Thought content does not include suicidal ideation

## 2022-04-04 NOTE — PATIENT INSTRUCTIONS
Check psychology today for counseling  Phone apps for anxiety / depression:  1  Moodpath  2  MindShift  3  What's Up  4  MoodMission - for stress  5  Woebot    Phone apps for mindfulness / meditation:  6  Smiling Mind  7  [address]  8  Insight times  9  Headspace   10  Up!  11  Calm  12  Youper  13  Breath 2 Relax  14  Superbetter      Neck Exercises   WHAT YOU NEED TO KNOW:   Why is it important to do neck exercises? Neck exercises help reduce neck pain, and improve neck movement and strength  Neck exercises also help prevent long-term neck problems  What do I need to know about neck exercises? · Do the exercises every day,  or as often as directed by your healthcare provider  · Move slowly, gently, and smoothly  Avoid fast or jerky motions  · Stand and sit the way your healthcare provider shows you  Good posture may reduce your neck pain  Check your posture often, even when you are not doing your neck exercises  How do I perform neck exercises safely? · Exercise position:  You may sit or stand while you do neck exercises  Face forward  Your shoulders should be straight and relaxed, with a good posture  · Head tilts, forward and back:  Gently bow your head and try to touch your chin to your chest  Your healthcare provider may tell you to push on the back of your neck to help bow your head  Raise your chin back to the starting position  Tilt your head back as far as possible so you are looking up at the ceiling  Your healthcare provider may tell you to lift your chin to help tilt your head back  Return your head to the starting position  · Head tilts, side to side:  Tilt your head, bringing your ear toward your shoulder  Then tilt your head toward the other shoulder  · Head turns:  Turn your head to look over your shoulder  Tilt your chin down and try to touch it to your shoulder  Do not raise your shoulder to your chin  Face forward again   Do the same on the other side          Upper Back Exercises   AMBULATORY CARE:   Upper back exercises  help heal and strengthen your back muscles and prevent another injury  Ask your healthcare provider if you need to see a physical therapist for more advanced exercises  · Do the exercises on a mat or firm surface  (not on a bed) to support your spine  · Move slowly and smoothly  Avoid fast or jerky motions  · Breathe normally  Do not hold your breath  · Stop if you feel pain  It is normal to feel some discomfort at first  Regular exercise will help decrease your discomfort over time  Seek care immediately if:   · You have severe pain that prevents you from moving  Contact your healthcare provider if:   · Your pain becomes worse  · You have new pain  · You have questions or concerns about your condition, care, or exercise program     Perform upper back exercises safely:  Ask your healthcare provider which of the following exercises are best for you and how often to do them  · Scapular squeeze:  Sit or stand with your arms at your sides  Squeeze your shoulder blades together and hold for 3 seconds  Relax and repeat 3 times  · Pectoralis stretch:   a doorway  Lift your hands and place them on each side of the door frame or wall slightly higher than your head  Lean forward slowly until you feel a gentle stretch  Hold for 15 seconds  Repeat 3 times, or as directed  · Cat and camel exercise:  Place your hands and knees on the floor  Arch your back upward toward the ceiling and lower your head  Round out your spine as much as you can  Hold for 5 seconds  Lift your head upward and push your chest downward toward the floor  Hold for 5 seconds  Do 3 sets or as directed  · Bird dog:  Place your hands and knees on the floor  Keep your wrists directly below your shoulders and your knees directly below your hips  Pull your belly button in toward your spine   Do not flatten or arch your back  Tighten your abdominal muscles  Raise one arm straight out so that it is aligned with your head  Next, raise the leg opposite your arm  Hold this position for 15 seconds  Lower your arm and leg slowly and change sides  Do 5 sets  © Copyright [employer] 2022 Information is for End User's use only and may not be sold, redistributed or otherwise used for commercial purposes  All illustrations and images included in CareNotes® are the copyrighted property of A D A M , Inc  or Willem Pate   The above information is an  only  It is not intended as medical advice for individual conditions or treatments  Talk to your doctor, nurse or pharmacist before following any medical regimen to see if it is safe and effective for you

## 2022-04-04 NOTE — LETTER
April 4, 2022     Patient: Derrick Randhawa   YOB: 1975   Date of Visit: 4/4/2022       To Whom it May Concern:    Derrick Randhawa is under my professional care  She was seen in my office on 4/4/2022  Please excuse her from work from 4/4 to 4/10/22  She may return to work on 4/11/2022  If you have any questions or concerns, please don't hesitate to call           Sincerely,          Aniyah Hatch MD        CC: No Recipients

## 2022-04-04 NOTE — ASSESSMENT & PLAN NOTE
Declined muscle relaxant, may continue with ibuprofen or Tylenol prn  Instructed to start warm moist heat followed by stretching exercises  This was demonstrated to her

## 2022-04-08 ENCOUNTER — TELEPHONE (OUTPATIENT)
Dept: INTERNAL MEDICINE CLINIC | Facility: CLINIC | Age: 47
End: 2022-04-08

## 2022-04-08 NOTE — TELEPHONE ENCOUNTER
Pt does not feel that she can return to work on Monday 4/11/22  She states that she is still having panic attacks and feels very anxious  She would like her return to work date extended

## 2022-04-08 NOTE — LETTER
April 8, 2022     Patient: Lilliana Newton   YOB: 1975   Date of Visit: 4/8/2022       To Whom it May Concern:    Lilliana Newton is under my professional care  She was seen in my office on 4/4/2022  She may return to work on 4/25/2022  If you have any questions or concerns, please don't hesitate to call           Sincerely,          Santosh Cole MD        CC: No Recipients

## 2022-04-13 ENCOUNTER — OFFICE VISIT (OUTPATIENT)
Dept: INTERNAL MEDICINE CLINIC | Facility: CLINIC | Age: 47
End: 2022-04-13
Payer: COMMERCIAL

## 2022-04-13 VITALS
HEART RATE: 75 BPM | DIASTOLIC BLOOD PRESSURE: 78 MMHG | SYSTOLIC BLOOD PRESSURE: 118 MMHG | BODY MASS INDEX: 23.74 KG/M2 | WEIGHT: 129 LBS | TEMPERATURE: 97.5 F | HEIGHT: 62 IN | OXYGEN SATURATION: 98 %

## 2022-04-13 DIAGNOSIS — J02.9 SORE THROAT: Primary | ICD-10-CM

## 2022-04-13 DIAGNOSIS — B34.9 VIRAL INFECTION, UNSPECIFIED: ICD-10-CM

## 2022-04-13 DIAGNOSIS — F41.9 ANXIETY: ICD-10-CM

## 2022-04-13 DIAGNOSIS — H93.13 TINNITUS OF BOTH EARS: ICD-10-CM

## 2022-04-13 DIAGNOSIS — R68.83 CHILLS (WITHOUT FEVER): ICD-10-CM

## 2022-04-13 DIAGNOSIS — G44.209 TENSION HEADACHE: ICD-10-CM

## 2022-04-13 LAB — S PYO AG THROAT QL: NEGATIVE

## 2022-04-13 PROCEDURE — 87880 STREP A ASSAY W/OPTIC: CPT | Performed by: INTERNAL MEDICINE

## 2022-04-13 PROCEDURE — 99213 OFFICE O/P EST LOW 20 MIN: CPT | Performed by: INTERNAL MEDICINE

## 2022-04-13 PROCEDURE — 87070 CULTURE OTHR SPECIMN AEROBIC: CPT | Performed by: INTERNAL MEDICINE

## 2022-04-13 PROCEDURE — 87636 SARSCOV2 & INF A&B AMP PRB: CPT | Performed by: INTERNAL MEDICINE

## 2022-04-13 NOTE — ASSESSMENT & PLAN NOTE
Improving since out of work  Continue meditation and relaxation techniques  Keep appointment with therapist next month  FMLA completed

## 2022-04-13 NOTE — PATIENT INSTRUCTIONS
Use saline nasal spray, 1-2 sprays each nostril, 1 to 2 times a day  May use saline gargles several times a day  May take Tylenol or ibuprofen with food for headache, fever/chills

## 2022-04-13 NOTE — PROGRESS NOTES
Assessment/Plan:    Anxiety  Improving since out of work  Continue meditation and relaxation techniques  Keep appointment with therapist next month  FMLA completed  Tension headache  Intermittent  Continue heat and stretching exercises  Diagnoses and all orders for this visit:    Sore throat  Comments:  Rapid strep negative  Start saline gargles  Monitor for fevers  Orders:  -     POCT rapid strepA  -     Strep A PCR; Future    Tinnitus of both ears  Comments:  Start saline nasal spray  Chills (without fever)    Viral infection, unspecified  -     Covid/Flu- Office Collect    Tension headache    Anxiety      Follow up as scheduled or as needed  Subjective:      Patient ID: Juli Frazier is a 51 y o  female c/o sore throat  She started to not feel well yesterday  He developed a sore throat and some bilateral ear discomfort  She reports ringing in both ears, worse in the right  She did a home COVID test yesterday which was negative  She reports no fevers but was having chills last night  She has been taking Robitussin  Denies any cough or chest pains  No GI symptoms  No known sick contacts  She has an appointment with a therapist next month  She has been using some of the apps given to her, feels that it has helped  She continues to experience intermittent headaches  She started doing the stretching exercises  The following portions of the patient's history were reviewed and updated as appropriate: allergies, current medications, past medical history, past social history and problem list     Review of Systems   Constitutional: Positive for chills and fatigue  Negative for fever  HENT: Positive for ear pain and sore throat  Negative for congestion, ear discharge, rhinorrhea and sinus pain  Respiratory: Negative for cough and shortness of breath  Cardiovascular: Negative for chest pain  Neurological: Positive for headaches     Psychiatric/Behavioral: The patient is nervous/anxious  Objective:      /78   Pulse 71   Temp 97 5 °F (36 4 °C)   Ht 5' 2" (1 575 m)   Wt 58 5 kg (129 lb)   SpO2 98%   BMI 23 59 kg/m²          Physical Exam  Constitutional:       Appearance: She is well-developed  HENT:      Head: Normocephalic and atraumatic  Right Ear: Tympanic membrane and ear canal normal       Left Ear: Tympanic membrane and ear canal normal       Mouth/Throat:      Mouth: Mucous membranes are moist       Pharynx: Posterior oropharyngeal erythema present  No pharyngeal swelling or oropharyngeal exudate  Musculoskeletal:      Cervical back: Spasms present  No tenderness or bony tenderness  No pain with movement  Thoracic back: Spasms present  Neurological:      General: No focal deficit present  Mental Status: She is alert and oriented to person, place, and time  Psychiatric:         Attention and Perception: Attention normal          Mood and Affect: Mood is anxious  Mood is not depressed           Behavior: Behavior normal

## 2022-04-14 LAB
FLUAV RNA RESP QL NAA+PROBE: NEGATIVE
FLUBV RNA RESP QL NAA+PROBE: NEGATIVE
SARS-COV-2 RNA RESP QL NAA+PROBE: POSITIVE

## 2022-04-15 LAB — BACTERIA THROAT CULT: NORMAL

## 2022-04-26 ENCOUNTER — OFFICE VISIT (OUTPATIENT)
Dept: SURGICAL ONCOLOGY | Facility: CLINIC | Age: 47
End: 2022-04-26
Payer: COMMERCIAL

## 2022-04-26 VITALS
HEART RATE: 70 BPM | BODY MASS INDEX: 23.65 KG/M2 | OXYGEN SATURATION: 99 % | DIASTOLIC BLOOD PRESSURE: 72 MMHG | SYSTOLIC BLOOD PRESSURE: 116 MMHG | HEIGHT: 62 IN | WEIGHT: 128.5 LBS | RESPIRATION RATE: 18 BRPM | TEMPERATURE: 98.3 F

## 2022-04-26 DIAGNOSIS — Z15.09 MONOALLELIC MUTATION OF ATM GENE: ICD-10-CM

## 2022-04-26 DIAGNOSIS — Z08 ENCOUNTER FOR FOLLOW-UP EXAMINATION AFTER COMPLETED TREATMENT FOR MALIGNANT NEOPLASM: ICD-10-CM

## 2022-04-26 DIAGNOSIS — Z86.000 HISTORY OF DUCTAL CARCINOMA IN SITU (DCIS) OF BREAST: Primary | ICD-10-CM

## 2022-04-26 DIAGNOSIS — Z15.89 MONOALLELIC MUTATION OF ATM GENE: ICD-10-CM

## 2022-04-26 DIAGNOSIS — Z15.01 MONOALLELIC MUTATION OF ATM GENE: ICD-10-CM

## 2022-04-26 PROCEDURE — 99213 OFFICE O/P EST LOW 20 MIN: CPT

## 2022-04-26 PROCEDURE — 3008F BODY MASS INDEX DOCD: CPT

## 2022-04-26 NOTE — PROGRESS NOTES
Surgical Oncology Follow Up       [address] [facility],[address] [facility]  [facility]  [address]  [facility] [address]    Avaninick McguireKoffiTenzin  1975  [id number]  [address] [facility],[address] [place] [address]  [facility]  [address] [facility] [address]    Chief Complaint   Patient presents with    Follow-up       Assessment/Plan:  1  History of ductal carcinoma in situ (DCIS) of breast  - 6 month follow up  - MRI breast bilateral w and wo contrast w cad; Future  - BUN; Future  - Creatinine, serum; Future    2  Encounter for follow-up examination after completed treatment for malignant neoplasm    3  Monoallelic mutation of JS gene  - MRI breast bilateral w and wo contrast w cad; Future     Discussion/Summary:  Patient is a 69-year-old female presenting today for six-month follow-up for right breast cancer diagnosed in February 2019  Pathology revealed DCIS ER 90%, FL 20%  She underwent genetic testing which revealed a mutation of JS  She underwent a right lumpectomy with Dr Winter Ballard  She completed whole breast radiation therapy and declined hormone therapy  She had a bilateral diagnostic mammogram on 02/25/2022 which was BI-RADS 2 category for density  I have placed order for a MRI bilateral breast for September of this year  There are no concerns on her breast exam  Patient was instructed to follow up with genetics on her last visit with Lorna Snow however she did not  I instructed the patient to continue seeing her GYN annually due to an increased risk for ovarian cancer  I will see the patient back in 6 months or sooner should the need arise  She was instructed to call with any questions or concerns prior to this time  All questions were answered today        History of Present Illness:     Oncology History   History of ductal carcinoma in situ (DCIS) of breast   2/25/2019 Biopsy    Right breast stereotatic biopsy  11 o'clock, anterior 3rd of the breast  Ductal carcinoma in situ  Grade 2  ER 90  CO 20    Concordant     3/14/2019 Genetic Testing    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  [facility]     4/2/2019 Surgery    Right lumpectomy  Ductal carcinoma in situ  1 7 cm  Grade 3  Margins negative  Stage 0     4/16/2019 - 4/16/2019 Hormone Therapy    Declined hormone therapy  Consult with Dionne Perales     5/13/2019 - 6/11/2019 Radiation    Dr Wilber Aschoff  Course: C1  Right breast, 16 fractions, total dose: 4256 cGy  Right breast cone down, 5 fractions, total dose: 1000 cGy          -Interval History:  Patient is a 58-year-old female presenting today for six-month follow-up for right breast cancer diagnosed in February 2019  She had a bilateral diagnostic mammogram on 02/25/2022 which was BI-RADS 2 category for density  Patient denies changes on her breast exam  She denies persistent headaches, bone pain, back pain, shortness of breath, or abdominal pain  Review of Systems:  Review of Systems   Constitutional: Negative for activity change, appetite change, fatigue and unexpected weight change  Respiratory: Negative for cough and shortness of breath  Cardiovascular: Negative for chest pain  Gastrointestinal: Negative for abdominal pain, diarrhea, nausea and vomiting  Endocrine: Negative for heat intolerance  Musculoskeletal: Negative for arthralgias, back pain and myalgias  Skin: Negative for rash  Neurological: Negative for weakness and headaches  Hematological: Negative for adenopathy  Psychiatric/Behavioral: The patient is nervous/anxious          Patient Active Problem List   Diagnosis    History of ductal carcinoma in situ (DCIS) of breast    Monoallelic mutation of JS gene    Vitamin D deficiency    Carpal tunnel syndrome on left    Encounter for follow-up examination after completed treatment for malignant neoplasm    Anxiety    Tension headache     Past Medical History:   Diagnosis Date    BRCA negative 03/14/2019 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; [facility]    Breast cancer ([facility] [address] ) 2019    right breast    Cancer ([facility] [address] ) 2019    breast    Female infertility     Fibrocystic breast     History of radiation therapy 2019    Right WBRT    Recurrent pregnancy loss, antepartum condition or complication     x 4     Past Surgical History:   Procedure Laterality Date    BREAST BIOPSY      BREAST LUMPECTOMY Right 2019    Procedure: BREAST LUMPECTOMY; BREAST NEEDLE LOCALIZATION (NEEDLE LOC AT 0800);   Surgeon: Jennifer Riggs MD;  [facility];  Service: Surgical Oncology     SECTION      INDUCED       x3    MAMMO NEEDLE LOCALIZATION RIGHT (ALL INC) Right 2019    MAMMO STEREOTACTIC BREAST BIOPSY RIGHT (ALL INC) Right 2019    MRI BREAST BIOPSY LEFT (ALL INCLUSIVE) Left 10/15/2021     Family History   Problem Relation Age of Onset    No Known Problems Mother     No Known Problems Father     No Known Problems Sister     Diabetes Brother     Hypertension Brother     No Known Problems Son     Breast cancer Maternal Grandmother 39    Diabetes Maternal Grandmother     Heart disease Maternal Grandfather     Diabetes Maternal Grandfather     Breast cancer Paternal Grandmother 39    Diabetes Paternal Grandfather     Heart disease Paternal Grandfather     Hypertension Paternal Grandfather     No Known Problems Sister     Heart attack Maternal Aunt      Social History     Socioeconomic History    Marital status: /Civil Union     Spouse name: Not on file    Number of children: Not on file    Years of education: Not on file    Highest education level: Not on file   Occupational History    Not on file   Tobacco Use    Smoking status: Never Smoker    Smokeless tobacco: Never Used   Vaping Use    Vaping Use: Never used   Substance and Sexual Activity    Alcohol use: Not Currently     Comment: rarely    Drug use: Never    Sexual activity: Yes     Partners: Male     Birth control/protection: None   Other Topics Concern    Not on file   Social History Narrative    Drinks coffee    Second Marriage    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     Social Determinants of Health     Financial Resource Strain: Not on file   Food Insecurity: Not on file   Transportation Needs: Not on file   Physical Activity: Not on file   Stress: Not on file   Social Connections: Not on file   Intimate Partner Violence: Not on file   Housing Stability: Not on file       Current Outpatient Medications:     busPIRone (BUSPAR) 5 mg tablet, Take 1 tablet (5 mg total) by mouth 3 (three) times a day as needed (anxiety), Disp: 60 tablet, Rfl: 0    multivitamin (THERAGRAN) TABS, Take 1 tablet by mouth daily, Disp: , Rfl:   Allergies   Allergen Reactions    Shellfish-Derived Products - Food Allergy Swelling     shrimp     Vitals:    04/26/22 1505   BP: 116/72   Pulse: 70   Resp: 18   Temp: 98 3 °F (36 8 °C)   SpO2: 99%       Physical Exam  Constitutional:       General: She is not in acute distress  Appearance: Normal appearance  Cardiovascular:      Rate and Rhythm: Normal rate and regular rhythm  Pulses: Normal pulses  Heart sounds: Normal heart sounds  Pulmonary:      Effort: Pulmonary effort is normal       Breath sounds: Normal breath sounds  Chest:      Chest wall: No mass  Breasts:      Right: No swelling, bleeding, inverted nipple, mass, nipple discharge, skin change, tenderness, axillary adenopathy or supraclavicular adenopathy  Left: No swelling, bleeding, inverted nipple, mass, nipple discharge, skin change, tenderness, axillary adenopathy or supraclavicular adenopathy  Comments: Right lumpectomy scar  No masses, nodularity, skin changes, nipple changes or discharge, or adenopathy appreciated on physical exam      Abdominal:      General: Abdomen is flat  Palpations: Abdomen is soft     Lymphadenopathy:      Upper Body:      Right upper body: No supraclavicular, axillary or pectoral adenopathy  Left upper body: No supraclavicular, axillary or pectoral adenopathy  Skin:     General: Skin is warm  Neurological:      General: No focal deficit present  Mental Status: She is alert and oriented to person, place, and time  Psychiatric:         Mood and Affect: Mood normal          Behavior: Behavior normal            Results:    Imaging  No results found  I reviewed the above imaging data  Advance Care Planning/Advance Directives:  Discussed disease status, cancer treatment plans and/or cancer treatment goals with the patient

## 2022-05-18 ENCOUNTER — OFFICE VISIT (OUTPATIENT)
Dept: INTERNAL MEDICINE CLINIC | Facility: CLINIC | Age: 47
End: 2022-05-18
Payer: COMMERCIAL

## 2022-05-18 VITALS
SYSTOLIC BLOOD PRESSURE: 124 MMHG | HEIGHT: 62 IN | WEIGHT: 130.2 LBS | OXYGEN SATURATION: 98 % | HEART RATE: 75 BPM | BODY MASS INDEX: 23.96 KG/M2 | DIASTOLIC BLOOD PRESSURE: 80 MMHG | TEMPERATURE: 97.4 F

## 2022-05-18 DIAGNOSIS — F41.9 ANXIETY: Primary | ICD-10-CM

## 2022-05-18 PROBLEM — U07.1 COVID-19 VIRUS INFECTION: Status: ACTIVE | Noted: 2022-05-18

## 2022-05-18 PROCEDURE — 3008F BODY MASS INDEX DOCD: CPT | Performed by: INTERNAL MEDICINE

## 2022-05-18 PROCEDURE — 99213 OFFICE O/P EST LOW 20 MIN: CPT | Performed by: INTERNAL MEDICINE

## 2022-05-18 PROCEDURE — 3725F SCREEN DEPRESSION PERFORMED: CPT | Performed by: INTERNAL MEDICINE

## 2022-05-18 RX ORDER — BUSPIRONE HYDROCHLORIDE 5 MG/1
5 TABLET ORAL 3 TIMES DAILY
Qty: 90 TABLET | Refills: 0 | Status: SHIPPED | OUTPATIENT
Start: 2022-05-18

## 2022-05-18 NOTE — ASSESSMENT & PLAN NOTE
Symptoms much better when out of work, restarted since returning about a month ago  She is unable to cope  She is seeing a therapist weekly  She has not been taking buspirone  Out of work note provided  She is looking for a new job

## 2022-05-18 NOTE — PROGRESS NOTES
Assessment/Plan:    Anxiety  Symptoms much better when out of work, restarted since returning about a month ago  She is unable to cope  She is seeing a therapist weekly  She has not been taking buspirone  Out of work note provided  She is looking for a new job  COVID-19 virus infection  Resolved, no lingering symptoms  Diagnoses and all orders for this visit:    Anxiety  -     busPIRone (BUSPAR) 5 mg tablet; Take 1 tablet (5 mg total) by mouth in the morning and 1 tablet (5 mg total) in the evening and 1 tablet (5 mg total) before bedtime  Follow up as scheduled or as needed  Subjective:      Patient ID: Sandi Sánchez is a 49 y o  female c/o anxiety  She returned to work about a month ago and is not doing too well  She is very stressed, over work  She has asked for help several times but the load still falls on her  She is unable to focus and concentrate  It is now affecting her home life  She now  has gotten into arguments because she is very stressed  She has not been able to sleep, feels her thoughts are running around all the time  She did have an appointment with her therapist a few days ago and was unable to talk to her because she was all over the place  She has seen her once a week for the past month  She does have was prone but she has not been taking this regularly  She denies any chest pain, palpitations  No suicidal thoughts  The following portions of the patient's history were reviewed and updated as appropriate: allergies, current medications, past medical history, past social history and problem list     Review of Systems   Constitutional: Negative for activity change and appetite change  Respiratory: Negative for shortness of breath  Cardiovascular: Negative for chest pain and palpitations  Neurological: Negative for dizziness and headaches     Psychiatric/Behavioral: Positive for agitation, decreased concentration and sleep disturbance  The patient is nervous/anxious and is hyperactive  Objective:      /80   Pulse 75   Temp (!) 97 4 °F (36 3 °C)   Ht 5' 2" (1 575 m)   Wt 59 1 kg (130 lb 3 2 oz)   SpO2 98%   BMI 23 81 kg/m²          Physical Exam  Constitutional:       General: She is not in acute distress  Appearance: Normal appearance  She is not ill-appearing  HENT:      Head: Normocephalic  Pulmonary:      Effort: Pulmonary effort is normal  No respiratory distress  Neurological:      General: No focal deficit present  Mental Status: She is alert and oriented to person, place, and time  Psychiatric:         Attention and Perception: Attention normal          Mood and Affect: Mood is anxious  Speech: Speech normal          Thought Content: Thought content is not paranoid  Thought content does not include suicidal ideation  Thought content does not include suicidal plan

## 2022-05-18 NOTE — LETTER
May 18, 2022     Patient: Opal Doe  YOB: 1975  Date of Visit: 5/18/2022      To Whom it May Concern:    Opal Doe is under my professional care  Lexis Garcia was seen in my office on 5/18/2022  Please excuse her from work from 5/18 to 6/19/22  Lexis Garcia may return to work on 6/20/22  If you have any questions or concerns, please don't hesitate to call           Sincerely,          Amber Mascorro MD        CC: No Recipients

## 2022-05-26 ENCOUNTER — TELEPHONE (OUTPATIENT)
Dept: GASTROENTEROLOGY | Facility: AMBULARY SURGERY CENTER | Age: 47
End: 2022-05-26

## 2022-05-26 ENCOUNTER — TELEPHONE (OUTPATIENT)
Dept: INTERNAL MEDICINE CLINIC | Facility: CLINIC | Age: 47
End: 2022-05-26

## 2022-05-26 NOTE — TELEPHONE ENCOUNTER
Please call patient  I am completing her disability form  What did would you like me to put on return to work? 9/1?

## 2022-05-26 NOTE — TELEPHONE ENCOUNTER
Patients GI provider:  Dr Jennifer Elizabeth    Number to return call: (  [phone]    Reason for call: Pt calling to reschedule her procedure    Scheduled procedure/appointment date if applicable: Apt/procedure   5-31-22

## 2022-06-13 ENCOUNTER — OFFICE VISIT (OUTPATIENT)
Dept: INTERNAL MEDICINE CLINIC | Facility: CLINIC | Age: 47
End: 2022-06-13
Payer: COMMERCIAL

## 2022-06-13 VITALS
BODY MASS INDEX: 22.82 KG/M2 | DIASTOLIC BLOOD PRESSURE: 72 MMHG | OXYGEN SATURATION: 99 % | HEART RATE: 84 BPM | TEMPERATURE: 97.5 F | SYSTOLIC BLOOD PRESSURE: 110 MMHG | HEIGHT: 62 IN | WEIGHT: 124 LBS

## 2022-06-13 DIAGNOSIS — G44.209 TENSION HEADACHE: ICD-10-CM

## 2022-06-13 DIAGNOSIS — F43.22 ADJUSTMENT DISORDER WITH ANXIOUS MOOD: Primary | ICD-10-CM

## 2022-06-13 DIAGNOSIS — Z71.9 HEALTH COUNSELING: ICD-10-CM

## 2022-06-13 DIAGNOSIS — Z86.000 HISTORY OF DUCTAL CARCINOMA IN SITU (DCIS) OF BREAST: ICD-10-CM

## 2022-06-13 PROBLEM — U07.1 COVID-19 VIRUS INFECTION: Status: RESOLVED | Noted: 2022-05-18 | Resolved: 2022-06-13

## 2022-06-13 PROCEDURE — 3725F SCREEN DEPRESSION PERFORMED: CPT | Performed by: INTERNAL MEDICINE

## 2022-06-13 PROCEDURE — 99213 OFFICE O/P EST LOW 20 MIN: CPT | Performed by: INTERNAL MEDICINE

## 2022-06-13 PROCEDURE — 3008F BODY MASS INDEX DOCD: CPT | Performed by: INTERNAL MEDICINE

## 2022-06-13 NOTE — PROGRESS NOTES
Assessment/Plan:    Adjustment disorder with anxious mood  Much improved  Taking buspirone tid, speaking to her therapist q2 weeks  Continue daily walks, relaxation exercises  Out of work, looking at [employer] [employer] or a new job  Tension headache  Resolved  History of ductal carcinoma in situ (DCIS) of breast  MRI scheduled  Diagnoses and all orders for this visit:    Adjustment disorder with anxious mood    Tension headache    History of ductal carcinoma in situ (DCIS) of breast    Health counseling  Comments:  Colonoscopy rescheduled  Follow up in 4 months or as needed  Subjective:      Patient ID: Momo Boothe is a 49 y o  female here for a follow up  She is doing much better since her last visit  She says she feels much better, feels more like herself  She is not working, sleeping much better  She started walking 2 to 3 times a day, has started a garden at home  She is looking for another job, considering nursing school  She continues to speak to her therapist every 2 weeks  She is taking the medication 3 times a day, regularly  The following portions of the patient's history were reviewed and updated as appropriate: allergies, current medications, past medical history, past social history and problem list     Review of Systems   Constitutional: Negative for activity change and appetite change  Respiratory: Negative for shortness of breath  Cardiovascular: Negative for chest pain and palpitations  Psychiatric/Behavioral: Negative for agitation, confusion, decreased concentration, dysphoric mood and sleep disturbance  The patient is not nervous/anxious  Objective:      /72   Pulse 84   Temp 97 5 °F (36 4 °C)   Ht 5' 2" (1 575 m)   Wt 56 2 kg (124 lb)   SpO2 99%   BMI 22 68 kg/m²          Physical Exam  Constitutional:       General: She is not in acute distress  Appearance: Normal appearance  She is not ill-appearing     HENT:      Head: Normocephalic and atraumatic  Mouth/Throat:      Mouth: Mucous membranes are moist    Eyes:      Pupils: Pupils are equal, round, and reactive to light  Pulmonary:      Effort: Pulmonary effort is normal  No respiratory distress  Neurological:      General: No focal deficit present  Mental Status: She is alert and oriented to person, place, and time  Psychiatric:         Mood and Affect: Mood normal          Behavior: Behavior normal            Labs & imaging results reviewed with patient

## 2022-06-13 NOTE — ASSESSMENT & PLAN NOTE
Much improved  Taking buspirone tid, speaking to her therapist q2 weeks  Continue daily walks, relaxation exercises  Out of work, looking at [address] or a new job

## 2022-06-13 NOTE — PATIENT INSTRUCTIONS
Core Strengthening Exercises   AMBULATORY CARE:   What you need to know about core strengthening exercises: Your core includes the muscles of your lower back, hip, pelvis, and abdomen  Core strengthening exercises help heal and strengthen these muscles  This helps prevent another injury, and keeps your pelvis, spine, and hips in the correct position  Contact your healthcare provider if:   You have sharp or worsening pain during exercise or at rest     You have questions or concerns about your shoulder exercises  Safety tips:  Talk to your healthcare provider before you start an exercise program  A physical therapist can teach you how to do core strengthening exercises safely  Do the exercises on a mat or firm surface  A firm surface will support your spine and prevent low back pain  Do not do these exercises on a bed  Move slowly and smoothly  Avoid fast or jerky motions  Stop if you feel pain  Core exercises should not be painful  Stop if you feel pain  Breathe normally during core exercises  Do not hold your breath  This may cause an increase in blood pressure and prevent muscle strengthening  Your healthcare provider will tell you when to inhale and exhale during the exercise  Begin all of your exercises with abdominal bracing  Abdominal bracing helps warm up your core muscles  You can also practice abdominal bracing throughout the day  Lie on your back with your knees bent and feet flat on the floor  Place your arms in a relaxed position beside your body  Tighten your abdominal muscles  Pull your belly button in and up toward your spine  Hold for 5 seconds  Relax your muscles  Repeat 10 times  Core strengthening exercises: Your healthcare provider will tell you how often to do these exercises  The provider will also tell you how many repetitions of each exercise you should do  Hold each exercise for 5 seconds or as directed   As you get stronger, increase your hold to 10 to 15 seconds  You can do some of these exercises on a stability ball, or with a weight  Ask your healthcare provider how to use a stability ball or weight for these exercises:  Bridging:  Lie on your back with your knees bent and feet flat on the floor  Rest your arms at your side  Tighten your buttocks, and then lift your hips 1 inch off the floor  Hold for 5 seconds  When you can do this exercise without pain for 10 seconds, increase the distance you lift your hips  A good goal is to be able to lift your hips so that your shoulders, hips, and knees are in a straight line  Dead bug:  Lie on your back with your knees bent and feet flat on the floor  Place your arms in a relaxed position beside your body  Begin with abdominal bracing  Next, raise one leg, keeping your knee bent  Hold for 5 seconds  Repeat with the other leg  When you can do this exercise without pain for 10 to 15 seconds, you may raise one straight leg and hold  Repeat with the other leg  Quadruped:  Place your hands and knees on the floor  Keep your wrists directly below your shoulders and your knees directly below your hips  Pull your belly button in toward your spine  Do not flatten or arch your back  Tighten your abdominal muscles below your belly button  Hold for 5 seconds  When you can do this exercise without pain for 10 to 15 seconds, you may extend one arm and hold  Repeat on the other side  Side bridge exercises:      Standing side bridge:  Stand next to a wall and extend one arm toward the wall  Place your palm flat on the wall with your fingers pointing upward  Begin with abdominal bracing  Next, without moving your feet, slowly bend your arm to 90 degrees  Hold for 5 seconds  Repeat on the other side  When you can do this exercise without pain for 10 to 15 seconds, you may do the bent leg side bridge on the floor  Bent leg side bridge:  Lie on one side with your legs, hips, and shoulders in a straight line  Prop yourself up onto your forearm so your elbow is directly below your shoulder  Bend your knees back to 90 degrees  Begin with abdominal bracing  Next, lift your hips and balance yourself on your forearm and knees  Hold for 5 seconds  Repeat on the other side  When you can do this exercise without pain for 10 to 15 seconds, you may do the straight leg side bridge on the floor  Straight leg side bridge:  Lie on one side with your legs, hips, and shoulders in a straight line  Prop yourself up onto your forearm so your elbow is directly below your shoulder  Begin with abdominal bracing  Lift your hips off the floor and balance yourself on your forearm and the outside of your flexed foot  Do not let your ankle bend sideways  Hold for 5 seconds  Repeat on the other side  When you can do this exercise without pain for 10 to 15 seconds, ask your healthcare provider for more advanced exercises  Superman:  Lie on your stomach  Extend your arms forward on the floor  Tighten your abdominal muscles and lift your right hand and left leg off the floor  Hold this position  Slowly return to the starting position  Tighten your abdominal muscles and lift your left hand and right leg off the floor  Hold this position  Slowly return to the starting position  Clam:  Lie on your side with your knees bent  Put your bottom arm under your head to keep your neck in line  Put your top hand on your hip to keep your pelvis from moving  Put your heels together, and keep them together during this exercise  Slowly raise your top knee toward the ceiling  Then lower your leg so your knees are together  Repeat this exercise 10 times  Then switch sides and do the exercise 10 times with the other leg  Curl up:  Lie on your back with your knees bent and feet flat on the floor  Place your hands, palms down, underneath your lower back   Next, with your elbows on the floor, lift your shoulders and chest 2 to 3 inches off the floor  Keep your head in line with your shoulders  Hold this position  Slowly return to the starting position  Straight leg raises:  Lie on your back with one leg straight  Bend the other knee and place your foot flat on the floor  Tighten your abdominal muscles  Keep your leg straight and slowly lift it straight up 6 to 12 inches off the floor  Hold this position  Lower your leg slowly  Do as many repetitions as directed on this side  Repeat with the other leg  Plank:  Lie on your stomach  Bend your elbows and place your forearms flat on the floor  Lift your chest, stomach, and knees off the floor  Make sure your elbows are below your shoulders  Your body should be in a straight line  Do not let your hips or lower back sink to the ground  Squeeze your abdominal muscles together and hold for 15 seconds  To make this exercise harder, hold for 30 seconds or lift 1 leg at a time  Bicycles:  Lie on your back  Bend both knees and bring them toward your chest  Your calves should be parallel to the floor  Place the palms of your hands on the back of your head  Straighten your right leg and keep it lifted 2 inches off the floor  Raise your head and shoulders off the floor and twist towards your left  Keep your head and shoulders lifted  Bend your right knee while you straighten your left leg  Keep your left leg 2 inches off the floor  Twist your head and chest towards the left leg  Continue to straighten 1 leg at a time and twist        Follow up with your doctor as directed:  Write down your questions so you remember to ask them during your visits  © Copyright [employer] 2022 Information is for End User's use only and may not be sold, redistributed or otherwise used for commercial purposes  All illustrations and images included in CareNotes® are the copyrighted property of [employer]  or Willem Pate   The above information is an  only   It is not intended as medical advice for individual conditions or treatments  Talk to your doctor, nurse or pharmacist before following any medical regimen to see if it is safe and effective for you

## 2022-07-28 ENCOUNTER — TELEPHONE (OUTPATIENT)
Dept: INTERNAL MEDICINE CLINIC | Facility: CLINIC | Age: 47
End: 2022-07-28

## 2022-07-28 NOTE — TELEPHONE ENCOUNTER
Pt  States to Sam Olea that she was never referred to mental health specialist  They want to know if she has been referred to mental health since we have been seeing her for anxiety for 8 mos  Now  Can call Sam Olea back until 3:45pm daily   Take lunch from 12-12:30pm

## 2022-07-29 NOTE — TELEPHONE ENCOUNTER
Spoke w/ pt   She said we can give Nataly the name of her therapist but no other info such as her therapist notes etc

## 2022-07-29 NOTE — TELEPHONE ENCOUNTER
Amarilis Marrero notified     Does the therapist report to PCP on progress etc?  Also do we have the name of the Therapist ?

## 2022-07-29 NOTE — TELEPHONE ENCOUNTER
Staff: please call insurance back with info  If patient did not give name of therapist, we do not need to give it to insurance  She continues to see them regularly

## 2022-08-26 ENCOUNTER — ANESTHESIA (OUTPATIENT)
Dept: ANESTHESIOLOGY | Facility: HOSPITAL | Age: 47
End: 2022-08-26

## 2022-08-26 ENCOUNTER — ANESTHESIA EVENT (OUTPATIENT)
Dept: ANESTHESIOLOGY | Facility: HOSPITAL | Age: 47
End: 2022-08-26

## 2022-09-06 ENCOUNTER — TELEPHONE (OUTPATIENT)
Dept: INTERNAL MEDICINE CLINIC | Facility: CLINIC | Age: 47
End: 2022-09-06

## 2022-09-06 ENCOUNTER — TELEPHONE (OUTPATIENT)
Dept: GASTROENTEROLOGY | Facility: AMBULARY SURGERY CENTER | Age: 47
End: 2022-09-06

## 2022-09-06 NOTE — TELEPHONE ENCOUNTER
Pt called the GI office to reschedule her colonoscopy from 9/8/2022 to 12/5/2022 w/ Dr Stefany Cochran at [place] [id number] due to pt's  having surgery per pt

## 2022-09-06 NOTE — LETTER
September 6, 2022     Patient: Jacqueline Denny  YOB: 1975      To Whom it May Concern:    Jacqueline Denny is under my professional care  Patrick Martinez may return to work on 9/8/2022  If you have any questions or concerns, please don't hesitate to call  Sincerely,        KEVIN Torres MD      CC: No Recipients

## 2022-09-06 NOTE — TELEPHONE ENCOUNTER
Spoke w/ pt  And adv'd  She will go back this coming Thursday  Call her when ready and she will p/u letter

## 2022-09-06 NOTE — TELEPHONE ENCOUNTER
You can return to work when you are ready at anytime  Just let me know the date and I will write a new letter  Keep schedule appt with me next month

## 2022-09-06 NOTE — TELEPHONE ENCOUNTER
Pt  Wants to know if she can go back to work earlier than 10/21-the date written on her FMLA paperwork  She is not being paid and needs to go back  Said if she needs to come in for an appt  she will

## 2022-09-19 ENCOUNTER — HOSPITAL ENCOUNTER (OUTPATIENT)
Dept: RADIOLOGY | Facility: HOSPITAL | Age: 47
Discharge: HOME | End: 2022-09-19
Payer: COMMERCIAL

## 2022-09-19 DIAGNOSIS — Z86.000 HISTORY OF DUCTAL CARCINOMA IN SITU (DCIS) OF BREAST: ICD-10-CM

## 2022-09-19 DIAGNOSIS — Z15.01 MONOALLELIC MUTATION OF ATM GENE: ICD-10-CM

## 2022-09-19 DIAGNOSIS — Z15.89 MONOALLELIC MUTATION OF ATM GENE: ICD-10-CM

## 2022-09-19 DIAGNOSIS — Z15.09 MONOALLELIC MUTATION OF ATM GENE: ICD-10-CM

## 2022-09-19 PROCEDURE — G1004 CDSM NDSC: HCPCS

## 2022-09-19 PROCEDURE — C8937 CAD BREAST MRI: HCPCS

## 2022-09-19 PROCEDURE — A9585 GADOBUTROL INJECTION: HCPCS | Performed by: RADIOLOGY

## 2022-09-19 PROCEDURE — C8908 MRI W/O FOL W/CONT, BREAST,: HCPCS

## 2022-09-19 RX ADMIN — GADOBUTROL 6 ML: 604.72 INJECTION INTRAVENOUS at 09:10

## 2022-10-07 ENCOUNTER — TELEPHONE (OUTPATIENT)
Dept: HEMATOLOGY ONCOLOGY | Facility: CLINIC | Age: 47
End: 2022-10-07

## 2022-10-07 NOTE — TELEPHONE ENCOUNTER
Appointment Cancellation Or Reschedule     Person calling in Patient    If other than patient calling, are they listed on the communication consent form? N/A   Provider Simon Fall   Office Visit Date and Time  10/26/22   Office Visit Location [facility], located within [facility]   Did patient want to reschedule their office appointment? If so, when was it scheduled to? Yes 11/16/22 8:30pm   Did you have STAR scheduled for this appointment? no   Do you need STAR set up for your new appointment? If yes, please send to "PATIENT RIDESHARE" pool for STAR rescheduling no   If you are cancelling appointment, can we notify STAR to cancel ride? If yes, please send to "PATIENT RIDESHARE" pool for STAR to cancel service no   Is this patient calling to reschedule an infusion appointment? no   When is their next infusion appointment? no   Is this patient a Chemo patient? no   Reason for Cancellation or Reschedule Patient called to reschedule appointment she canceled  If the patient is a treatment patient, please route this to the office nurse  If the patient is not on treatment, please route to the office MA  If the patient is a surgical oncology patient, please route to surg/onc clinical pool

## 2022-10-10 ENCOUNTER — RA CDI HCC (OUTPATIENT)
Dept: OTHER | Facility: HOSPITAL | Age: 47
End: 2022-10-10

## 2022-10-10 NOTE — PROGRESS NOTES
[facility]  coding opportunities       Chart reviewed, no opportunity found: CHART REVIEWED, NO OPPORTUNITY FOUND        Patients Insurance        Commercial Insurance: Michelle Garcia

## 2022-10-17 ENCOUNTER — OFFICE VISIT (OUTPATIENT)
Dept: INTERNAL MEDICINE CLINIC | Facility: CLINIC | Age: 47
End: 2022-10-17
Payer: COMMERCIAL

## 2022-10-17 VITALS
HEIGHT: 62 IN | SYSTOLIC BLOOD PRESSURE: 120 MMHG | HEART RATE: 87 BPM | TEMPERATURE: 97.1 F | BODY MASS INDEX: 23.55 KG/M2 | OXYGEN SATURATION: 98 % | WEIGHT: 128 LBS | DIASTOLIC BLOOD PRESSURE: 72 MMHG

## 2022-10-17 DIAGNOSIS — E55.9 VITAMIN D DEFICIENCY: ICD-10-CM

## 2022-10-17 DIAGNOSIS — Z00.00 LABORATORY EXAMINATION ORDERED AS PART OF A ROUTINE GENERAL MEDICAL EXAMINATION: ICD-10-CM

## 2022-10-17 DIAGNOSIS — Z86.000 HISTORY OF DUCTAL CARCINOMA IN SITU (DCIS) OF BREAST: ICD-10-CM

## 2022-10-17 DIAGNOSIS — F41.9 ANXIETY: ICD-10-CM

## 2022-10-17 DIAGNOSIS — Z71.9 HEALTH COUNSELING: ICD-10-CM

## 2022-10-17 DIAGNOSIS — E78.49 OTHER HYPERLIPIDEMIA: ICD-10-CM

## 2022-10-17 DIAGNOSIS — F43.22 ADJUSTMENT DISORDER WITH ANXIOUS MOOD: Primary | ICD-10-CM

## 2022-10-17 PROBLEM — G44.209 TENSION HEADACHE: Status: RESOLVED | Noted: 2022-04-04 | Resolved: 2022-10-17

## 2022-10-17 PROCEDURE — 99214 OFFICE O/P EST MOD 30 MIN: CPT | Performed by: INTERNAL MEDICINE

## 2022-10-17 RX ORDER — BUSPIRONE HYDROCHLORIDE 5 MG/1
5 TABLET ORAL 3 TIMES DAILY PRN
Qty: 30 TABLET | Refills: 0 | Status: SHIPPED | OUTPATIENT
Start: 2022-10-17

## 2022-10-17 NOTE — PROGRESS NOTES
Assessment/Plan:    Adjustment disorder with anxious mood  Doing better, taking buspirone prn only  Still sees her therapist q    Vitamin D deficiency  Taking daily MVI  History of ductal carcinoma in situ (DCIS) of breast  Repeat breast MRI 1 years, schedule diagnostic mammogram     Other hyperlipidemia  Repeat lipids  Diagnoses and all orders for this visit:    Adjustment disorder with anxious mood    Other hyperlipidemia  -     Comprehensive metabolic panel  -     Lipid panel  -     TSH, 3rd generation with Free T4 reflex    History of ductal carcinoma in situ (DCIS) of breast  -     CBC and differential    Vitamin D deficiency  -     Vitamin D 25 hydroxy    Anxiety  -     busPIRone (BUSPAR) 5 mg tablet; Take 1 tablet (5 mg total) by mouth 3 (three) times a day as needed (anxiety)    Health counseling  Comments:  Declined flu vaccine  Mammogram, breast MRI updated  Colonoscopy scheduled  Laboratory examination ordered as part of a routine general medical examination  -     CBC and differential  -     Comprehensive metabolic panel  -     Lipid panel  -     TSH, 3rd generation with Free T4 reflex  -     Vitamin D 25 hydroxy    Follow up in 6 months or as needed  Subjective:      Patient ID: Nabil Harrison is a 49 y o  female here for a follow up  She is feeleing much better  She started a new job last month, doing well  She reports no anxiety, no panic attacks  She is sleeping much better  She finished buspirone and has not needed it  She stopped seeing the therapist, doing well on her own  She started walking almost daily  Denies any chest pain or shortness of breath  The following portions of the patient's history were reviewed and updated as appropriate: allergies, current medications, past medical history, past social history and problem list     Review of Systems   Constitutional: Positive for activity change  Negative for appetite change and fatigue     HENT: Negative for congestion, ear pain and postnasal drip  Eyes: Negative for visual disturbance  Respiratory: Negative for cough and shortness of breath  Cardiovascular: Negative for chest pain and leg swelling  Gastrointestinal: Negative for abdominal pain, constipation and diarrhea  Genitourinary: Negative for dysuria, frequency and urgency  Musculoskeletal: Negative for arthralgias and myalgias  Skin: Negative for rash and wound  Neurological: Negative for dizziness and headaches  Psychiatric/Behavioral: Negative for confusion, dysphoric mood and sleep disturbance  The patient is not nervous/anxious  Objective:      /72 (BP Location: Left arm, Patient Position: Sitting, Cuff Size: Adult)   Pulse 87   Temp (!) 97 1 °F (36 2 °C)   Ht 5' 2" (1 575 m)   Wt 58 1 kg (128 lb)   SpO2 98%   BMI 23 41 kg/m²          Physical Exam  Vitals and nursing note reviewed  Constitutional:       General: She is not in acute distress  Appearance: She is well-developed  HENT:      Head: Normocephalic and atraumatic  Eyes:      Pupils: Pupils are equal, round, and reactive to light  Cardiovascular:      Rate and Rhythm: Normal rate and regular rhythm  Heart sounds: Normal heart sounds  Pulmonary:      Effort: Pulmonary effort is normal       Breath sounds: Normal breath sounds  No wheezing  Abdominal:      General: Bowel sounds are normal       Palpations: Abdomen is soft  Musculoskeletal:         General: No swelling  Right lower leg: No edema  Left lower leg: No edema  Skin:     General: Skin is warm  Findings: No rash  Neurological:      General: No focal deficit present  Mental Status: She is alert and oriented to person, place, and time  Psychiatric:         Attention and Perception: Attention normal          Mood and Affect: Mood and affect normal  Mood is not anxious or depressed           Speech: Speech normal          Behavior: Behavior normal  Labs & imaging results reviewed with patient

## 2022-10-21 LAB
25(OH)D3+25(OH)D2 SERPL-MCNC: 45.5 NG/ML (ref 30–100)
ALBUMIN SERPL-MCNC: 4.6 G/DL (ref 3.8–4.8)
ALBUMIN/GLOB SERPL: 1.8 {RATIO} (ref 1.2–2.2)
ALP SERPL-CCNC: 76 IU/L (ref 44–121)
ALT SERPL-CCNC: 11 IU/L (ref 0–32)
AST SERPL-CCNC: 14 IU/L (ref 0–40)
BASOPHILS # BLD AUTO: 0.1 X10E3/UL (ref 0–0.2)
BASOPHILS NFR BLD AUTO: 1 %
BILIRUB SERPL-MCNC: <0.2 MG/DL (ref 0–1.2)
BUN SERPL-MCNC: 10 MG/DL (ref 6–24)
BUN/CREAT SERPL: 13 (ref 9–23)
CALCIUM SERPL-MCNC: 9.5 MG/DL (ref 8.7–10.2)
CHLORIDE SERPL-SCNC: 102 MMOL/L (ref 96–106)
CHOLEST SERPL-MCNC: 201 MG/DL (ref 100–199)
CHOLEST/HDLC SERPL: 3.2 RATIO (ref 0–4.4)
CO2 SERPL-SCNC: 25 MMOL/L (ref 20–29)
CREAT SERPL-MCNC: 0.78 MG/DL (ref 0.57–1)
EGFR: 94 ML/MIN/1.73
EOSINOPHIL # BLD AUTO: 0.2 X10E3/UL (ref 0–0.4)
EOSINOPHIL NFR BLD AUTO: 3 %
ERYTHROCYTE [DISTWIDTH] IN BLOOD BY AUTOMATED COUNT: 11.8 % (ref 11.7–15.4)
GLOBULIN SER-MCNC: 2.6 G/DL (ref 1.5–4.5)
GLUCOSE SERPL-MCNC: 86 MG/DL (ref 70–99)
HCT VFR BLD AUTO: 42 % (ref 34–46.6)
HDLC SERPL-MCNC: 63 MG/DL
HGB BLD-MCNC: 13.5 G/DL (ref 11.1–15.9)
IMM GRANULOCYTES # BLD: 0 X10E3/UL (ref 0–0.1)
IMM GRANULOCYTES NFR BLD: 0 %
LDLC SERPL CALC-MCNC: 126 MG/DL (ref 0–99)
LYMPHOCYTES # BLD AUTO: 1.9 X10E3/UL (ref 0.7–3.1)
LYMPHOCYTES NFR BLD AUTO: 28 %
MCH RBC QN AUTO: 28.4 PG (ref 26.6–33)
MCHC RBC AUTO-ENTMCNC: 32.1 G/DL (ref 31.5–35.7)
MCV RBC AUTO: 88 FL (ref 79–97)
MONOCYTES # BLD AUTO: 0.6 X10E3/UL (ref 0.1–0.9)
MONOCYTES NFR BLD AUTO: 9 %
NEUTROPHILS # BLD AUTO: 4.2 X10E3/UL (ref 1.4–7)
NEUTROPHILS NFR BLD AUTO: 59 %
PLATELET # BLD AUTO: 342 X10E3/UL (ref 150–450)
POTASSIUM SERPL-SCNC: 4.3 MMOL/L (ref 3.5–5.2)
PROT SERPL-MCNC: 7.2 G/DL (ref 6–8.5)
RBC # BLD AUTO: 4.75 X10E6/UL (ref 3.77–5.28)
SL AMB VLDL CHOLESTEROL CALC: 12 MG/DL (ref 5–40)
SODIUM SERPL-SCNC: 139 MMOL/L (ref 134–144)
TRIGL SERPL-MCNC: 67 MG/DL (ref 0–149)
TSH SERPL DL<=0.005 MIU/L-ACNC: 2.61 UIU/ML (ref 0.45–4.5)
WBC # BLD AUTO: 7 X10E3/UL (ref 3.4–10.8)

## 2022-10-31 ENCOUNTER — TELEPHONE (OUTPATIENT)
Dept: INTERNAL MEDICINE CLINIC | Facility: CLINIC | Age: 47
End: 2022-10-31

## 2022-10-31 NOTE — TELEPHONE ENCOUNTER
Lab results: Your cholesterol was slightly high  Limit fatty foods, red meat    The rest of your labs were normal

## 2022-11-16 ENCOUNTER — TELEPHONE (OUTPATIENT)
Dept: HEMATOLOGY ONCOLOGY | Facility: CLINIC | Age: 47
End: 2022-11-16

## 2022-11-16 ENCOUNTER — OFFICE VISIT (OUTPATIENT)
Dept: SURGICAL ONCOLOGY | Facility: CLINIC | Age: 47
End: 2022-11-16

## 2022-11-16 VITALS
DIASTOLIC BLOOD PRESSURE: 68 MMHG | WEIGHT: 130 LBS | OXYGEN SATURATION: 98 % | RESPIRATION RATE: 18 BRPM | HEART RATE: 80 BPM | TEMPERATURE: 98.7 F | SYSTOLIC BLOOD PRESSURE: 112 MMHG | BODY MASS INDEX: 23.92 KG/M2 | HEIGHT: 62 IN

## 2022-11-16 DIAGNOSIS — Z15.09 MONOALLELIC MUTATION OF ATM GENE: ICD-10-CM

## 2022-11-16 DIAGNOSIS — Z86.000 HISTORY OF DUCTAL CARCINOMA IN SITU (DCIS) OF BREAST: ICD-10-CM

## 2022-11-16 DIAGNOSIS — Z15.01 MONOALLELIC MUTATION OF ATM GENE: ICD-10-CM

## 2022-11-16 DIAGNOSIS — Z08 ENCOUNTER FOR FOLLOW-UP EXAMINATION AFTER COMPLETED TREATMENT FOR MALIGNANT NEOPLASM: Primary | ICD-10-CM

## 2022-11-16 DIAGNOSIS — Z15.89 MONOALLELIC MUTATION OF ATM GENE: ICD-10-CM

## 2022-11-16 NOTE — PROGRESS NOTES
Surgical Oncology Follow Up       [address] [facility],[address] [facility]  [facility] [place]  [address]  [facility] [address]    Roge Lopez  1975  [id number]  [address]  [facility] [place]  [address] [facility] [address]    Chief Complaint   Patient presents with   • office visit       Assessment/Plan:  1  Encounter for follow-up examination after completed treatment for malignant neoplasm  - 1 year follow up  - q6 month CBE with us and gyn  - annual mammogram and breast MRI    2  History of ductal carcinoma in situ (DCIS) of breast  - Mammo diagnostic bilateral w 3d & cad; Future    3  Monoallelic mutation of JS gene  - no family history of pancreatic cancer  - MRI breast bilateral w and wo contrast w cad; Future         Discussion/Summary: Patient is a 62-year-old female presenting today for six-month follow-up for right breast cancer diagnosed in February 2019   Pathology revealed DCIS ER 90%, MO 20%   She underwent genetic testing which revealed a mutation of JS    She underwent a right lumpectomy with Dr Urbano Cleary completed whole breast radiation therapy and declined hormone therapy  She had a bilateral breast MRI on 09/19/2022 which was benign  She will be due for bilateral diagnostic mammogram in February of next year  There were no concerns on her clinical breast exam  I have scheduled her MRI for next year  We will see her back in one year or sooner should the need arise and have her follow with her GYN in approx 6 months for another CBE  She will still receive CBE's every 6 months  She was instructed to call with any questions or concerns prior to this time   All questions were answered today          History of Present Illness:     Oncology History   History of ductal carcinoma in situ (DCIS) of breast   2/25/2019 Biopsy    Right breast stereotatic biopsy  11 o'clock, anterior 3rd of the breast  Ductal carcinoma in situ  Grade 2  ER 90  WV 20    Concordant     3/14/2019 Genetic Testing    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  [facility]     4/2/2019 Surgery    Right lumpectomy  Ductal carcinoma in situ  1 7 cm  Grade 3  Margins negative  Stage 0     4/16/2019 - 4/16/2019 Hormone Therapy    Declined hormone therapy  Consult with Osmar Bergman     5/13/2019 - 6/11/2019 Radiation    Dr Monae Notice  Course: C1  Right breast, 16 fractions, total dose: 4256 cGy  Right breast cone down, 5 fractions, total dose: 1000 cGy          -Interval History: Patient is a 69-year-old female presenting today for six-month follow-up for right breast cancer diagnosed in February 2019  She had a bilateral breast MRI on 09/19/2022 which was benign  Patient denies changes on her breast exam  She denies changing in her family history  Review of Systems:  Review of Systems   Constitutional: Negative for activity change, appetite change, fatigue and unexpected weight change  Respiratory: Negative for cough and shortness of breath  Cardiovascular: Negative for chest pain  Gastrointestinal: Negative for abdominal pain, diarrhea, nausea and vomiting  Endocrine: Negative for heat intolerance  Musculoskeletal: Negative for arthralgias, back pain and myalgias  Skin: Negative for rash  Neurological: Negative for weakness and headaches  Hematological: Negative for adenopathy  Patient Active Problem List   Diagnosis   • History of ductal carcinoma in situ (DCIS) of breast   • Monoallelic mutation of JS gene   • Vitamin D deficiency   • Carpal tunnel syndrome on left   • Encounter for follow-up examination after completed treatment for malignant neoplasm   • Adjustment disorder with anxious mood   • Other hyperlipidemia     Past Medical History:   Diagnosis Date   • BRCA negative 03/14/2019    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;  [facility]   • Breast cancer ([facility] [address] ) 02/06/2019    right breast   • Cancer [facility]) 02/2019    breast   • Female infertility    • Fibrocystic breast    • History of radiation therapy 2019    Right WBRT   • Recurrent pregnancy loss, antepartum condition or complication     x 4     Past Surgical History:   Procedure Laterality Date   • BREAST BIOPSY     • BREAST LUMPECTOMY Right 2019    Procedure: BREAST LUMPECTOMY; BREAST NEEDLE LOCALIZATION (NEEDLE LOC AT 0800);   Surgeon: Ariella Quesada MD;  [facility];  Service: Surgical Oncology   •  SECTION     • INDUCED       x3   • MAMMO NEEDLE LOCALIZATION RIGHT (ALL INC) Right 2019   • MAMMO STEREOTACTIC BREAST BIOPSY RIGHT (ALL INC) Right 2019   • MRI BREAST BIOPSY LEFT (ALL INCLUSIVE) Left 10/15/2021     Family History   Problem Relation Age of Onset   • No Known Problems Mother    • No Known Problems Father    • No Known Problems Sister    • Diabetes Brother    • Hypertension Brother    • No Known Problems Son    • Breast cancer Maternal Grandmother 39   • Diabetes Maternal Grandmother    • Heart disease Maternal Grandfather    • Diabetes Maternal Grandfather    • Breast cancer Paternal Grandmother 39   • Diabetes Paternal Grandfather    • Heart disease Paternal Grandfather    • Hypertension Paternal Grandfather    • No Known Problems Sister    • Heart attack Maternal Aunt      Social History     Socioeconomic History   • Marital status: /Civil Union     Spouse name: Not on file   • Number of children: Not on file   • Years of education: Not on file   • Highest education level: Not on file   Occupational History   • Not on file   Tobacco Use   • Smoking status: Never   • Smokeless tobacco: Never   Vaping Use   • Vaping Use: Never used   Substance and Sexual Activity   • Alcohol use: Not Currently     Comment: rarely   • Drug use: Never   • Sexual activity: Yes     Partners: Male     Birth control/protection: None   Other Topics Concern   • Not on file   Social History Narrative    Drinks coffee    Second Marriage    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     Social Determinants of Health     Financial Resource Strain: Not on file   Food Insecurity: Not on file   Transportation Needs: Not on file   Physical Activity: Not on file   Stress: Not on file   Social Connections: Not on file   Intimate Partner Violence: Not on file   Housing Stability: Not on file       Current Outpatient Medications:   •  busPIRone (BUSPAR) 5 mg tablet, Take 1 tablet (5 mg total) by mouth 3 (three) times a day as needed (anxiety), Disp: 30 tablet, Rfl: 0  •  multivitamin (THERAGRAN) TABS, Take 1 tablet by mouth daily, Disp: , Rfl:   Allergies   Allergen Reactions   • Shellfish-Derived Products - Food Allergy Swelling     shrimp     Vitals:    11/16/22 0814   BP: 112/68   Pulse: 80   Resp: 18   Temp: 98 7 °F (37 1 °C)   SpO2: 98%       Physical Exam  Constitutional:       General: She is not in acute distress  Appearance: Normal appearance  Cardiovascular:      Rate and Rhythm: Normal rate and regular rhythm  Pulses: Normal pulses  Heart sounds: Normal heart sounds  Pulmonary:      Effort: Pulmonary effort is normal       Breath sounds: Normal breath sounds  Chest:      Chest wall: No mass  Breasts:     Right: No swelling, bleeding, inverted nipple, mass, nipple discharge, skin change or tenderness  Left: No swelling, bleeding, inverted nipple, mass, nipple discharge, skin change or tenderness  Comments: No masses, nodularity, skin changes, nipple changes or discharge, or adenopathy appreciated on physical exam      Abdominal:      General: Abdomen is flat  Palpations: Abdomen is soft  Lymphadenopathy:      Upper Body:      Right upper body: No supraclavicular, axillary or pectoral adenopathy  Left upper body: No supraclavicular, axillary or pectoral adenopathy  Skin:     General: Skin is warm  Neurological:      General: No focal deficit present  Mental Status: She is alert and oriented to person, place, and time  Psychiatric:         Mood and Affect: Mood normal          Behavior: Behavior normal            Results:    Imaging  No results found  I reviewed the above imaging data  Advance Care Planning/Advance Directives:  Discussed disease status, cancer treatment plans and/or cancer treatment goals with the patient

## 2022-11-16 NOTE — TELEPHONE ENCOUNTER
LM detailed message for patient to call back regarding scheduling a diagnostic mammo around 2/26/23 and a breast MRI for around 9/20/23 and a 1 year f/u with Marinana Staple around 11/17/23

## 2022-11-17 ENCOUNTER — TELEPHONE (OUTPATIENT)
Dept: HEMATOLOGY ONCOLOGY | Facility: CLINIC | Age: 47
End: 2022-11-17

## 2022-11-17 NOTE — TELEPHONE ENCOUNTER
Scheduling Appointment SEND TO [facility]    Who Is Calling to Schedule Patient   Doctor Delmis Ojeda   Location [facility]   Date and Time 11/13/23 8:30AM   Reason for scheduling appointment 1 year follow up   Patient verbalized understanding   Yes

## 2022-12-02 ENCOUNTER — NURSE TRIAGE (OUTPATIENT)
Dept: OTHER | Facility: OTHER | Age: 47
End: 2022-12-02

## 2022-12-02 NOTE — TELEPHONE ENCOUNTER
Patient provided Clenpiq for bowel prep  Triage RN reviewed prep instructions for colonoscopy scheduled 12/5/22  Reviewed clear liquid diet and foods to avoid  Patient did report having popcorn on 12/1/22 but will restrict further intake along with other foods listed  Reason for Disposition  • Bowel prep for colonoscopy, questions about    Answer Assessment - Initial Assessment Questions  1  DATE/TIME: "When did you have your colonoscopy?"       Scheduled for 12/5/22    2   MAIN CONCERN: "What is your main concern right now?" "What questions do you have?"      Bowel prep for Clenpiq    Protocols used: COLONOSCOPY SYMPTOMS AND QUESTIONS-ADULT-

## 2022-12-02 NOTE — TELEPHONE ENCOUNTER
Regarding: Colonoscopy prep instructions needed  ----- Message from Levi Menchaca sent at 12/2/2022  9:26 AM EST -----  "I am having a colonoscopy on 12/5/22 and I don't have prep instruction with me and need to know what to  "

## 2022-12-04 RX ORDER — SODIUM CHLORIDE, SODIUM LACTATE, POTASSIUM CHLORIDE, CALCIUM CHLORIDE 600; 310; 30; 20 MG/100ML; MG/100ML; MG/100ML; MG/100ML
125 INJECTION, SOLUTION INTRAVENOUS CONTINUOUS
Status: CANCELLED | OUTPATIENT
Start: 2022-12-04

## 2022-12-04 RX ORDER — LIDOCAINE HYDROCHLORIDE 10 MG/ML
0.5 INJECTION, SOLUTION EPIDURAL; INFILTRATION; INTRACAUDAL; PERINEURAL ONCE AS NEEDED
Status: CANCELLED | OUTPATIENT
Start: 2022-12-04

## 2022-12-05 ENCOUNTER — HOSPITAL ENCOUNTER (OUTPATIENT)
Dept: GASTROENTEROLOGY | Facility: AMBULARY SURGERY CENTER | Age: 47
Setting detail: OUTPATIENT SURGERY
Discharge: HOME | End: 2022-12-05
Attending: INTERNAL MEDICINE

## 2022-12-05 ENCOUNTER — ANESTHESIA (OUTPATIENT)
Dept: GASTROENTEROLOGY | Facility: AMBULARY SURGERY CENTER | Age: 47
End: 2022-12-05

## 2022-12-05 ENCOUNTER — ANESTHESIA EVENT (OUTPATIENT)
Dept: GASTROENTEROLOGY | Facility: AMBULARY SURGERY CENTER | Age: 47
End: 2022-12-05

## 2022-12-05 VITALS
TEMPERATURE: 97.8 F | BODY MASS INDEX: 24.17 KG/M2 | OXYGEN SATURATION: 100 % | WEIGHT: 128 LBS | HEIGHT: 61 IN | SYSTOLIC BLOOD PRESSURE: 152 MMHG | DIASTOLIC BLOOD PRESSURE: 84 MMHG | HEART RATE: 68 BPM | RESPIRATION RATE: 16 BRPM

## 2022-12-05 DIAGNOSIS — Z12.11 COLON CANCER SCREENING: ICD-10-CM

## 2022-12-05 LAB
EXT PREGNANCY TEST URINE: NEGATIVE
EXT. CONTROL: NORMAL

## 2022-12-05 RX ORDER — SODIUM CHLORIDE, SODIUM LACTATE, POTASSIUM CHLORIDE, CALCIUM CHLORIDE 600; 310; 30; 20 MG/100ML; MG/100ML; MG/100ML; MG/100ML
125 INJECTION, SOLUTION INTRAVENOUS CONTINUOUS
Status: DISCONTINUED | OUTPATIENT
Start: 2022-12-05 | End: 2022-12-05

## 2022-12-05 RX ORDER — ONDANSETRON 2 MG/ML
4 INJECTION INTRAMUSCULAR; INTRAVENOUS ONCE
Status: COMPLETED | OUTPATIENT
Start: 2022-12-05 | End: 2022-12-05

## 2022-12-05 RX ORDER — LIDOCAINE HYDROCHLORIDE 10 MG/ML
INJECTION, SOLUTION EPIDURAL; INFILTRATION; INTRACAUDAL; PERINEURAL AS NEEDED
Status: DISCONTINUED | OUTPATIENT
Start: 2022-12-05 | End: 2022-12-05

## 2022-12-05 RX ORDER — PROPOFOL 10 MG/ML
INJECTION, EMULSION INTRAVENOUS AS NEEDED
Status: DISCONTINUED | OUTPATIENT
Start: 2022-12-05 | End: 2022-12-05

## 2022-12-05 RX ORDER — LIDOCAINE HYDROCHLORIDE 10 MG/ML
0.5 INJECTION, SOLUTION EPIDURAL; INFILTRATION; INTRACAUDAL; PERINEURAL ONCE AS NEEDED
Status: DISCONTINUED | OUTPATIENT
Start: 2022-12-05 | End: 2022-12-09 | Stop reason: HOSPADM

## 2022-12-05 RX ADMIN — ONDANSETRON 4 MG: 2 INJECTION INTRAMUSCULAR; INTRAVENOUS at 11:44

## 2022-12-05 RX ADMIN — LIDOCAINE HYDROCHLORIDE 50 MG: 10 INJECTION, SOLUTION EPIDURAL; INFILTRATION; INTRACAUDAL at 12:25

## 2022-12-05 RX ADMIN — PROPOFOL 100 MG: 10 INJECTION, EMULSION INTRAVENOUS at 12:25

## 2022-12-05 RX ADMIN — PROPOFOL 50 MG: 10 INJECTION, EMULSION INTRAVENOUS at 12:34

## 2022-12-05 RX ADMIN — PROPOFOL 20 MG: 10 INJECTION, EMULSION INTRAVENOUS at 12:27

## 2022-12-05 RX ADMIN — PROPOFOL 30 MG: 10 INJECTION, EMULSION INTRAVENOUS at 12:41

## 2022-12-05 RX ADMIN — PROPOFOL 50 MG: 10 INJECTION, EMULSION INTRAVENOUS at 12:30

## 2022-12-05 RX ADMIN — PROPOFOL 30 MG: 10 INJECTION, EMULSION INTRAVENOUS at 12:36

## 2022-12-05 RX ADMIN — SODIUM CHLORIDE, SODIUM LACTATE, POTASSIUM CHLORIDE, AND CALCIUM CHLORIDE: .6; .31; .03; .02 INJECTION, SOLUTION INTRAVENOUS at 11:44

## 2022-12-05 RX ADMIN — PROPOFOL 30 MG: 10 INJECTION, EMULSION INTRAVENOUS at 12:39

## 2022-12-05 NOTE — H&P
History and Physical - [facility]  Thania Nance 49 y o  female MRN: [id number]                  HPI: Thania Nance is a 49y o  year old female who presents for index screening colonoscopy  REVIEW OF SYSTEMS: Per the HPI, and otherwise unremarkable  Historical Information   Past Medical History:   Diagnosis Date   • BRCA negative 2019    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; [facility]   • Breast cancer ([facility] [address] ) 2019    right breast   • Cancer ([facility] [address] ) 2019    breast   • Female infertility    • Fibrocystic breast    • History of radiation therapy 2019    Right WBRT   • Recurrent pregnancy loss, antepartum condition or complication     x 4     Past Surgical History:   Procedure Laterality Date   • BREAST BIOPSY     • BREAST LUMPECTOMY Right 2019    Procedure: BREAST LUMPECTOMY; BREAST NEEDLE LOCALIZATION (NEEDLE LOC AT 0800);   Surgeon: Bjorn Martinez MD;  [facility];  Service: Surgical Oncology   •  SECTION     • INDUCED       x3   • MAMMO NEEDLE LOCALIZATION RIGHT (ALL INC) Right 2019   • MAMMO STEREOTACTIC BREAST BIOPSY RIGHT (ALL INC) Right 2019   • MRI BREAST BIOPSY LEFT (ALL INCLUSIVE) Left 10/15/2021     Social History   Social History     Substance and Sexual Activity   Alcohol Use Not Currently    Comment: rarely     Social History     Substance and Sexual Activity   Drug Use Never     Social History     Tobacco Use   Smoking Status Never   Smokeless Tobacco Never     Family History   Problem Relation Age of Onset   • No Known Problems Mother    • No Known Problems Father    • No Known Problems Sister    • Diabetes Brother    • Hypertension Brother    • No Known Problems Son    • Breast cancer Maternal Grandmother 39   • Diabetes Maternal Grandmother    • Heart disease Maternal Grandfather    • Diabetes Maternal Grandfather    • Breast cancer Paternal Grandmother 39   • Diabetes Paternal Grandfather    • Heart disease Paternal Grandfather    • Hypertension Paternal Grandfather    • No Known Problems Sister    • Heart attack Maternal Aunt        Meds/Allergies       Current Outpatient Medications:   •  busPIRone (BUSPAR) 5 mg tablet  •  multivitamin (THERAGRAN) TABS    Current Facility-Administered Medications:   •  lactated ringers infusion, 125 mL/hr, Intravenous, Continuous  •  lidocaine (PF) (XYLOCAINE-MPF) 1 % injection 0 5 mL, 0 5 mL, Infiltration, Once PRN    Allergies   Allergen Reactions   • Shellfish-Derived Products - Food Allergy Swelling     shrimp       Objective     /60   Pulse 71 Comment: sr  Temp (!) 97 °F (36 1 °C) (Temporal)   Resp 16   Ht 5' 1" (1 549 m)   Wt 58 1 kg (128 lb)   SpO2 100%   BMI 24 19 kg/m²       PHYSICAL EXAM    Gen: NAD  Head: NCAT  CV: RRR  CHEST: Clear  ABD: soft, NT/ND  EXT: no edema      ASSESSMENT/PLAN:  This is a 49y o  year old female here for colonoscopy, and she is stable and optimized for her procedure

## 2022-12-05 NOTE — ANESTHESIA POSTPROCEDURE EVALUATION
Post-Op Assessment Note    CV Status:  Stable  Pain Score: 0    Pain management: adequate     Mental Status:  Sleepy and arousable   Hydration Status:  Stable and euvolemic   PONV Controlled:  Controlled   Airway Patency:  Patent      Post Op Vitals Reviewed: Yes      Staff: CRNA         No notable events documented      BP   139/86   Temp  97 8   Pulse  84   Resp   15   SpO2   99

## 2022-12-05 NOTE — ANESTHESIA PREPROCEDURE EVALUATION
Procedure:  COLONOSCOPY    Relevant Problems   CARDIO   (+) Other hyperlipidemia      NEURO/PSYCH   (+) Encounter for follow-up examination after completed treatment for malignant neoplasm   (+) History of ductal carcinoma in situ (DCIS) of breast        Physical Exam    Airway    Mallampati score: II  TM Distance: >3 FB  Neck ROM: full     Dental   No notable dental hx     Cardiovascular  Comment: Negative ROS, Rhythm: regular, Rate: normal, Cardiovascular exam normal    Pulmonary  Pulmonary exam normal Breath sounds clear to auscultation,     Other Findings        Anesthesia Plan  ASA Score- 2     Anesthesia Type- IV sedation with anesthesia with ASA Monitors  Additional Monitors:   Airway Plan:     Comment: Discussed risks/benefits, including medication reactions, awareness, aspiration, and serious/life threatening complications  Plan to maintain native airway with IVGA, monitored with EtCO2  Plan Factors-Exercise tolerance (METS): >4 METS  Patient summary reviewed  Patient instructed to abstain from smoking on day of procedure  Patient did not smoke on day of surgery  Induction- intravenous  Postoperative Plan-     Informed Consent- Anesthetic plan and risks discussed with patient  I personally reviewed this patient with the CRNA  Discussed and agreed on the Anesthesia Plan with the CRNA  Virginia Dorsey

## 2023-02-01 ENCOUNTER — OFFICE VISIT (OUTPATIENT)
Dept: OBGYN CLINIC | Facility: CLINIC | Age: 48
End: 2023-02-01

## 2023-02-01 VITALS
HEIGHT: 61 IN | DIASTOLIC BLOOD PRESSURE: 74 MMHG | WEIGHT: 126 LBS | SYSTOLIC BLOOD PRESSURE: 138 MMHG | BODY MASS INDEX: 23.79 KG/M2

## 2023-02-01 DIAGNOSIS — Z01.419 WELL WOMAN EXAM WITH ROUTINE GYNECOLOGICAL EXAM: Primary | ICD-10-CM

## 2023-02-01 DIAGNOSIS — Z12.31 ENCOUNTER FOR SCREENING MAMMOGRAM FOR MALIGNANT NEOPLASM OF BREAST: ICD-10-CM

## 2023-02-01 DIAGNOSIS — Z12.4 ENCOUNTER FOR SCREENING FOR MALIGNANT NEOPLASM OF CERVIX: ICD-10-CM

## 2023-02-01 DIAGNOSIS — Z11.51 SCREENING FOR HPV (HUMAN PAPILLOMAVIRUS): ICD-10-CM

## 2023-02-01 NOTE — PROGRESS NOTES
ASSESSMENT & PLAN:   Diagnoses and all orders for this visit:    Well woman exam with routine gynecological exam  -     Liquid-based pap, screening    Encounter for screening for malignant neoplasm of cervix  -     Liquid-based pap, screening    Screening for HPV (human papillomavirus)  -     Liquid-based pap, screening    Encounter for screening mammogram for malignant neoplasm of breast  -     Mammo screening bilateral w 3d & cad; Future          The following were reviewed in today's visit: ASCCP guidelines, Gardisil vaccination, STD testing breast self exam, menopause, exercise and healthy diet  Patient to return to office in yearly for annual exam      All questions have been answered to her satisfaction  CC:  Annual Gynecologic Examination  Chief Complaint   Patient presents with   • Gynecologic Exam     Pt is here for her annual exam and pap smear  She is doing well, no concerns today  Last pap 2019 neg pap/neg hpv  breast cancer 2019  Mammo 2022  Colonoscopy 2022 repeat in 10 years       HPI: Laura Card is a 49 y o  [id number] who presents for annual gynecologic examination  She has the following concerns:  Getting night sweats, worse when she's close to her period  Health Maintenance:    Exercise: frequently  Breast exams/breast awareness: yes  Diet: "it's going"   Last mammogram: 2022  Colorectal cancer screenin2022 - 10 year      Past Medical History:   Diagnosis Date   • BRCA negative 2019    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;  [employer]   • Breast cancer ([facility] [address] ) 2019    right breast   • Cancer ([facility] [address] ) 2019    breast   • Female infertility    • Fibrocystic breast    • History of radiation therapy 2019    Right WBRT   • Recurrent pregnancy loss, antepartum condition or complication     x 4       Past Surgical History:   Procedure Laterality Date   • BREAST BIOPSY     • BREAST LUMPECTOMY Right 2019    Procedure: BREAST LUMPECTOMY; BREAST NEEDLE LOCALIZATION (NEEDLE LOC AT 0800); Surgeon: Inessa Arango MD;  [facility];  Service: Surgical Oncology   •  SECTION     • INDUCED       x3   • MAMMO NEEDLE LOCALIZATION RIGHT (ALL INC) Right 2019   • MAMMO STEREOTACTIC BREAST BIOPSY RIGHT (ALL INC) Right 2019   • MRI BREAST BIOPSY LEFT (ALL INCLUSIVE) Left 10/15/2021       Past OB/Gyn History:  Period Cycle (Days): 30  Period Duration (Days): 4-5  Period Pattern: Regular  Menstrual Flow: Moderate, Heavy  Dysmenorrhea: (!) Mild  Dysmenorrhea Symptoms: Cramping, HeadachePatient's last menstrual period was 12/10/2022  Menopausal status: premenopausal  Menopausal symptoms: night sweats    Last Pap: 2019 : no abnormalities  History of abnormal Pap smear: no    Patient is currently sexually active     STD testing: no  Current contraception: vasectomy      Family History  Family History   Problem Relation Age of Onset   • No Known Problems Mother    • No Known Problems Father    • No Known Problems Sister    • Diabetes Brother    • Hypertension Brother    • No Known Problems Son    • Breast cancer Maternal Grandmother 39   • Diabetes Maternal Grandmother    • Heart disease Maternal Grandfather    • Diabetes Maternal Grandfather    • Breast cancer Paternal Grandmother 39   • Diabetes Paternal Grandfather    • Heart disease Paternal Grandfather    • Hypertension Paternal Grandfather    • No Known Problems Sister    • Heart attack Maternal Aunt        Family history of uterine or ovarian cancer: no  Family history of breast cancer: yes  Family history of colon cancer: no    Social History:  Social History     Socioeconomic History   • Marital status: /Civil Union     Spouse name: Not on file   • Number of children: Not on file   • Years of education: Not on file   • Highest education level: Not on file   Occupational History   • Not on file   Tobacco Use   • Smoking status: Never   • Smokeless tobacco: Never   Vaping Use   • Vaping Use: Never used   Substance and Sexual Activity   • Alcohol use: Not Currently     Comment: rarely   • Drug use: Never   • Sexual activity: Yes     Partners: Male     Birth control/protection: None   Other Topics Concern   • Not on file   Social History Narrative    Drinks coffee    Second Marriage    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     Social Determinants of Health     Financial Resource Strain: Not on file   Food Insecurity: Not on file   Transportation Needs: Not on file   Physical Activity: Not on file   Stress: Not on file   Social Connections: Not on file   Intimate Partner Violence: Not on file   Housing Stability: Not on file     Domestic violence screen: negative    Allergies: Allergies   Allergen Reactions   • Shellfish-Derived Products - Food Allergy Swelling     shrimp       Medications:    Current Outpatient Medications:   •  busPIRone (BUSPAR) 5 mg tablet, Take 1 tablet (5 mg total) by mouth 3 (three) times a day as needed (anxiety), Disp: 30 tablet, Rfl: 0  •  multivitamin (THERAGRAN) TABS, Take 1 tablet by mouth daily, Disp: , Rfl:     Review of Systems:  Review of Systems   Constitutional: Negative for chills and fever  Respiratory: Negative for cough and shortness of breath  Cardiovascular: Negative for chest pain and palpitations  Gastrointestinal: Negative for abdominal distention, abdominal pain, blood in stool, constipation, nausea and vomiting  Genitourinary: Negative for difficulty urinating, dyspareunia, dysuria, frequency, menstrual problem, pelvic pain, urgency, vaginal bleeding, vaginal discharge and vaginal pain  Neurological: Negative for headaches  Physical Exam:  /74   Ht 5' 1" (1 549 m)   Wt 57 2 kg (126 lb)   LMP 12/10/2022   BMI 23 81 kg/m²    Physical Exam  Constitutional:       General: She is awake  Appearance: Normal appearance  She is well-developed     Genitourinary:      Vulva, bladder and urethral meatus normal       Right Labia: No rash, tenderness or lesions  Left Labia: No tenderness, lesions or rash  No labial fusion noted  No vaginal discharge, erythema, tenderness or bleeding  No vaginal prolapse present  Mild vaginal atrophy present  Right Adnexa: not tender, not full and no mass present  Left Adnexa: not tender, not full and no mass present  Cervix is nulliparous  No cervical motion tenderness, discharge, lesion or polyp  Uterus is not enlarged, fixed, tender or irregular  No uterine mass detected  No urethral prolapse present  Bladder is not tender  Pelvic exam was performed with patient in the lithotomy position  Breasts:     Right: No inverted nipple, mass, nipple discharge, skin change or tenderness  Left: No inverted nipple, mass, nipple discharge, skin change or tenderness  Breast exam comments: H/o right breast lumpectomy with scar  No concerning masses palpable  Rachelle Camara HENT:      Head: Normocephalic and atraumatic  Cardiovascular:      Rate and Rhythm: Normal rate and regular rhythm  Heart sounds: Normal heart sounds  Pulmonary:      Effort: Pulmonary effort is normal  No tachypnea or respiratory distress  Breath sounds: Normal breath sounds  Abdominal:      General: Abdomen is flat  There is no distension  Palpations: Abdomen is soft  Tenderness: There is no abdominal tenderness  There is no guarding or rebound  Musculoskeletal:      Cervical back: Neck supple  Lymphadenopathy:      Upper Body:      Right upper body: No supraclavicular or axillary adenopathy  Left upper body: No supraclavicular or axillary adenopathy  Neurological:      General: No focal deficit present  Mental Status: She is alert and oriented to person, place, and time  Psychiatric:         Mood and Affect: Mood normal          Behavior: Behavior normal          Thought Content:  Thought content normal          Judgment: Judgment normal    Vitals reviewed

## 2023-02-03 LAB
HPV HR 12 DNA CVX QL NAA+PROBE: NEGATIVE
HPV16 DNA CVX QL NAA+PROBE: NEGATIVE
HPV18 DNA CVX QL NAA+PROBE: NEGATIVE

## 2023-02-03 NOTE — RESULT ENCOUNTER NOTE
Radha Martínez,     Your HPV testing is negative  Your pap is still pending, and will be updated soon  Please contact the office at [phone] with any questions       Akilah

## 2023-02-08 LAB
LAB AP GYN PRIMARY INTERPRETATION: NORMAL
Lab: NORMAL

## 2023-03-09 ENCOUNTER — HOSPITAL ENCOUNTER (OUTPATIENT)
Dept: RADIOLOGY | Facility: HOSPITAL | Age: 48
Discharge: HOME | End: 2023-03-09

## 2023-03-09 VITALS — WEIGHT: 125 LBS | HEIGHT: 61 IN | BODY MASS INDEX: 23.6 KG/M2

## 2023-03-09 DIAGNOSIS — Z86.000 HISTORY OF DUCTAL CARCINOMA IN SITU (DCIS) OF BREAST: ICD-10-CM

## 2023-09-22 ENCOUNTER — HOSPITAL ENCOUNTER (OUTPATIENT)
Dept: RADIOLOGY | Facility: HOSPITAL | Age: 48
Discharge: HOME | End: 2023-09-22
Payer: COMMERCIAL

## 2023-09-22 VITALS — HEIGHT: 61 IN | WEIGHT: 125 LBS | BODY MASS INDEX: 23.6 KG/M2

## 2023-09-22 DIAGNOSIS — Z15.01 MONOALLELIC MUTATION OF ATM GENE: ICD-10-CM

## 2023-09-22 DIAGNOSIS — Z15.09 MONOALLELIC MUTATION OF ATM GENE: ICD-10-CM

## 2023-09-22 DIAGNOSIS — Z15.89 MONOALLELIC MUTATION OF ATM GENE: ICD-10-CM

## 2023-09-22 PROCEDURE — C8908 MRI W/O FOL W/CONT, BREAST,: HCPCS

## 2023-09-22 PROCEDURE — C8937 CAD BREAST MRI: HCPCS

## 2023-09-22 PROCEDURE — A9585 GADOBUTROL INJECTION: HCPCS

## 2023-09-22 PROCEDURE — G1004 CDSM NDSC: HCPCS

## 2023-09-22 RX ORDER — GADOBUTROL 604.72 MG/ML
6 INJECTION INTRAVENOUS
Status: COMPLETED | OUTPATIENT
Start: 2023-09-22 | End: 2023-09-22

## 2023-09-22 RX ADMIN — GADOBUTROL 6 ML: 604.72 INJECTION INTRAVENOUS at 14:25

## 2023-11-13 ENCOUNTER — OFFICE VISIT (OUTPATIENT)
Dept: SURGICAL ONCOLOGY | Facility: CLINIC | Age: 48
End: 2023-11-13
Payer: COMMERCIAL

## 2023-11-13 VITALS
HEART RATE: 91 BPM | RESPIRATION RATE: 16 BRPM | WEIGHT: 123 LBS | OXYGEN SATURATION: 95 % | SYSTOLIC BLOOD PRESSURE: 126 MMHG | BODY MASS INDEX: 23.22 KG/M2 | TEMPERATURE: 97.7 F | HEIGHT: 61 IN | DIASTOLIC BLOOD PRESSURE: 74 MMHG

## 2023-11-13 DIAGNOSIS — Z15.01 MONOALLELIC MUTATION OF ATM GENE: ICD-10-CM

## 2023-11-13 DIAGNOSIS — Z08 ENCOUNTER FOR FOLLOW-UP EXAMINATION AFTER COMPLETED TREATMENT FOR MALIGNANT NEOPLASM: Primary | ICD-10-CM

## 2023-11-13 DIAGNOSIS — Z91.89 INCREASED RISK OF BREAST CANCER: ICD-10-CM

## 2023-11-13 DIAGNOSIS — Z15.89 MONOALLELIC MUTATION OF ATM GENE: ICD-10-CM

## 2023-11-13 DIAGNOSIS — Z15.09 MONOALLELIC MUTATION OF ATM GENE: ICD-10-CM

## 2023-11-13 DIAGNOSIS — Z86.000 HISTORY OF DUCTAL CARCINOMA IN SITU (DCIS) OF BREAST: ICD-10-CM

## 2023-11-13 PROCEDURE — 99213 OFFICE O/P EST LOW 20 MIN: CPT

## 2023-11-13 NOTE — PROGRESS NOTES
Surgical Oncology Follow Up       [address] [facility] [place]  [facility] [address]    Pia Files  1975  [id number]  [address] [facility] SURGICAL ONCOLOGY [facility] [place] [address]    Chief Complaint   Patient presents with    Follow-up       Assessment/Plan:  1. Encounter for follow-up examination after completed treatment for malignant neoplasm  - 1 year follow up  - q6 mo cbe with us and gyn    2. History of ductal carcinoma in situ (DCIS) of breast  - Mammo diagnostic bilateral w 3d & cad; Future    3. Monoallelic mutation of JS gene  - MRI breast bilateral w and wo contrast w cad; Future  - no family hx of pancreatic cancer    4. Increased risk of breast cancer  - MRI breast bilateral w and wo contrast w cad; Future       Discussion/Summary:  Patient is a 59-year-old female presenting today for 1 year follow-up for right breast cancer diagnosed in February 2019. Pathology revealed DCIS ER 90%, PA 20%. She underwent genetic testing which revealed a mutation of JS. She had a right lumpectomy with Dr. Robbin Riggs. She completed whole breast radiation therapy and declined hormone therapy. She had a b/l dx mammogram on 3/8/23 which was BIRADS 2 category 4 density. She had a bilateral breast MRI on 09/22/2023 which was benign. There were no concerns on her clinical breast exam. I will get her imaging scheduled for next year. We will see her back in one year or sooner should the need arise and have her follow with her GYN in approx 6 months for another CBE. She will still receive CBE's every 6 months. She was instructed to call with any questions or concerns prior to this time. All questions were answered today.        History of Present Illness:     Oncology History   History of ductal carcinoma in situ (DCIS) of breast   2/25/2019 Biopsy    Right breast stereotatic biopsy  11 o'clock, anterior 3rd of the breast  Ductal carcinoma in situ  Grade 2  ER 90  WY 20    [facility]     3/1/2019 -  Cancer Staged    Staging form: Breast, AJCC 8th Edition  - Pathologic stage from 3/1/2019: Stage Unknown (pTis (DCIS), pNX, cM0, G3, ER+, WY+, HER2: Not Assessed) - Signed by Emmalene Boxer on 11/13/2023  Stage prefix: Initial diagnosis  Neoadjuvant therapy: No  Nuclear grade: G2  Multigene prognostic tests performed: None  Histologic grading system: 3 grade system  Laterality: Right  Tumor size (mm): 17       3/14/2019 Genetic Testing    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  [employer]     4/2/2019 Surgery    Right lumpectomy  Ductal carcinoma in situ  1.7 cm  Grade 3  Margins negative  Stage 0     4/16/2019 - 4/16/2019 Hormone Therapy    Declined hormone therapy  Consult with Jose Melendez     5/13/2019 - 6/11/2019 Radiation    Dr. Carmen Fernandez  Course: C1  Right breast, 16 fractions, total dose: 4256 cGy  Right breast cone down, 5 fractions, total dose: 1000 cGy          -Interval History: Patient is a 50-year-old female presenting today for 1 year follow-up for right breast cancer diagnosed in February 2019. She is on obs. She had a b/l dx mammogram on 3/8/23 which was BIRADS 2 category 4 density. She had a bilateral breast MRI on 09/22/2023 which was benign. Patient denies changes on her breast exam. She denies persistent headaches, bone pain, back pain, shortness of breath, or abdominal pain. Review of Systems:  Review of Systems   Constitutional:  Negative for activity change, appetite change, fatigue and unexpected weight change. Respiratory:  Negative for cough and shortness of breath. Cardiovascular:  Negative for chest pain. Gastrointestinal:  Negative for abdominal pain, diarrhea, nausea and vomiting. Endocrine: Negative for heat intolerance. Musculoskeletal:  Negative for arthralgias, back pain and myalgias. Skin:  Negative for rash.    Neurological:  Negative for weakness and headaches. Hematological:  Negative for adenopathy. 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; [employer]    Breast cancer ([address]) 2019    right breast    Cancer ([address]) 2019    breast    Female infertility     Fibrocystic breast     History of radiation therapy 2019    Right WBRT    Recurrent pregnancy loss, antepartum condition or complication     x 4     Past Surgical History:   Procedure Laterality Date    BREAST BIOPSY Left     BREAST LUMPECTOMY Right 2019    Procedure: BREAST LUMPECTOMY; BREAST NEEDLE LOCALIZATION (NEEDLE LOC AT 0800);   Surgeon: Jaime Chong MD;  [facility];  Service: Surgical Oncology     SECTION      INDUCED       x3    MAMMO NEEDLE LOCALIZATION RIGHT (ALL INC) Right 2019    MAMMO STEREOTACTIC BREAST BIOPSY RIGHT (ALL INC) Right 2019    MRI BREAST BIOPSY LEFT (ALL INCLUSIVE) Left 10/15/2021     Family History   Problem Relation Age of Onset    No Known Problems Mother     No Known Problems Father     No Known Problems Sister     Diabetes Brother     Hypertension Brother     No Known Problems Son     Breast cancer Maternal Grandmother 39    Diabetes Maternal Grandmother     Heart disease Maternal Grandfather     Diabetes Maternal Grandfather     Breast cancer Paternal Grandmother 39    Diabetes Paternal Grandfather     Heart disease Paternal Grandfather     Hypertension Paternal Grandfather     No Known Problems Sister     Heart attack Maternal Aunt      Social History     Socioeconomic History    Marital status: /Civil Union Spouse name: Not on file    Number of children: Not on file    Years of education: Not on file    Highest education level: Not on file   Occupational History    Not on file   Tobacco Use    Smoking status: Never    Smokeless tobacco: Never   Vaping Use    Vaping Use: Never used   Substance and Sexual Activity    Alcohol use: Not Currently     Comment: rarely    Drug use: Never    Sexual activity: Yes     Partners: Male     Birth control/protection: None   Other Topics Concern    Not on file   Social History Narrative    Drinks coffee    Second Marriage    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     Social Determinants of Health     Financial Resource Strain: Not on file   Food Insecurity: Not on file   Transportation Needs: Not on file   Physical Activity: Not on file   Stress: Not on file   Social Connections: Not on file   Intimate Partner Violence: Not on file   Housing Stability: Not on file       Current Outpatient Medications:     busPIRone (BUSPAR) 5 mg tablet, Take 1 tablet (5 mg total) by mouth 3 (three) times a day as needed (anxiety), Disp: 30 tablet, Rfl: 0    multivitamin (THERAGRAN) TABS, Take 1 tablet by mouth daily, Disp: , Rfl:   Allergies   Allergen Reactions    Shellfish-Derived Products - Food Allergy Swelling     shrimp     Vitals:    11/13/23 0834   BP: 126/74   Pulse: 91   Resp: 16   Temp: 97.7 °F (36.5 °C)   SpO2: 95%       Physical Exam  Constitutional:       General: She is not in acute distress. Appearance: Normal appearance. Cardiovascular:      Rate and Rhythm: Normal rate and regular rhythm. Pulses: Normal pulses. Heart sounds: Normal heart sounds. Pulmonary:      Effort: Pulmonary effort is normal.      Breath sounds: Normal breath sounds. Chest:      Chest wall: No mass. Breasts:     Right: No swelling, bleeding, inverted nipple, mass, nipple discharge, skin change or tenderness.       Left: No swelling, bleeding, inverted nipple, mass, nipple discharge, skin change or tenderness. Abdominal:      General: Abdomen is flat. Palpations: Abdomen is soft. Lymphadenopathy:      Upper Body:      Right upper body: No supraclavicular, axillary or pectoral adenopathy. Left upper body: No supraclavicular, axillary or pectoral adenopathy. Skin:     General: Skin is warm. Neurological:      General: No focal deficit present. Mental Status: She is alert and oriented to person, place, and time. Psychiatric:         Mood and Affect: Mood normal.         Behavior: Behavior normal.           Results:    Imaging  No results found. I reviewed the above imaging data. Advance Care Planning/Advance Directives:  Discussed disease status, cancer treatment plans and/or cancer treatment goals with the patient.

## 2024-03-13 ENCOUNTER — HOSPITAL ENCOUNTER (OUTPATIENT)
Dept: RADIOLOGY | Facility: HOSPITAL | Age: 49
Discharge: HOME | End: 2024-03-13
Payer: COMMERCIAL

## 2024-03-13 DIAGNOSIS — Z86.000 HISTORY OF DUCTAL CARCINOMA IN SITU (DCIS) OF BREAST: ICD-10-CM

## 2024-03-13 PROCEDURE — 77066 DX MAMMO INCL CAD BI: CPT

## 2024-03-13 PROCEDURE — G0279 TOMOSYNTHESIS, MAMMO: HCPCS

## 2024-09-23 ENCOUNTER — HOSPITAL ENCOUNTER (OUTPATIENT)
Dept: RADIOLOGY | Facility: HOSPITAL | Age: 49
Discharge: HOME | End: 2024-09-23
Payer: COMMERCIAL

## 2024-09-23 DIAGNOSIS — Z15.89 MONOALLELIC MUTATION OF ATM GENE: ICD-10-CM

## 2024-09-23 DIAGNOSIS — Z15.09 MONOALLELIC MUTATION OF ATM GENE: ICD-10-CM

## 2024-09-23 DIAGNOSIS — Z91.89 INCREASED RISK OF BREAST CANCER: ICD-10-CM

## 2024-09-23 DIAGNOSIS — Z15.01 MONOALLELIC MUTATION OF ATM GENE: ICD-10-CM

## 2024-09-23 PROCEDURE — C8937 CAD BREAST MRI: HCPCS

## 2024-09-23 PROCEDURE — G1004 CDSM NDSC: HCPCS

## 2024-09-23 PROCEDURE — A9585 GADOBUTROL INJECTION: HCPCS

## 2024-09-23 PROCEDURE — C8908 MRI W/O FOL W/CONT, BREAST,: HCPCS

## 2024-09-23 RX ORDER — GADOBUTROL 604.72 MG/ML
6 INJECTION INTRAVENOUS
Status: COMPLETED | OUTPATIENT
Start: 2024-09-23 | End: 2024-09-23

## 2024-09-23 RX ADMIN — GADOBUTROL 6 ML: 604.72 INJECTION INTRAVENOUS at 14:14

## 2024-09-25 ENCOUNTER — ANNUAL EXAM (OUTPATIENT)
Dept: OBGYN CLINIC | Facility: CLINIC | Age: 49
End: 2024-09-25
Payer: COMMERCIAL

## 2024-09-25 VITALS
WEIGHT: 125 LBS | SYSTOLIC BLOOD PRESSURE: 112 MMHG | DIASTOLIC BLOOD PRESSURE: 80 MMHG | BODY MASS INDEX: 23.6 KG/M2 | HEIGHT: 61 IN

## 2024-09-25 DIAGNOSIS — Z01.419 WOMEN'S ANNUAL ROUTINE GYNECOLOGICAL EXAMINATION: Primary | ICD-10-CM

## 2024-09-25 DIAGNOSIS — Z12.31 ENCOUNTER FOR SCREENING MAMMOGRAM FOR MALIGNANT NEOPLASM OF BREAST: ICD-10-CM

## 2024-09-25 PROCEDURE — 99396 PREV VISIT EST AGE 40-64: CPT | Performed by: OBSTETRICS & GYNECOLOGY

## 2024-09-25 NOTE — PROGRESS NOTES
ASSESSMENT & PLAN:   Diagnoses and all orders for this visit:    Women's annual routine gynecological examination    Encounter for screening mammogram for malignant neoplasm of breast  -     Mammo screening bilateral w 3d and cad; Future          The following were reviewed in today's visit: ASCCP guidelines, Gardisil vaccination, STD testing breast self exam, menopause, and exercise.    Patient to return to office in yearly for annual exam.     All questions have been answered to her satisfaction.        CC:  Annual Gynecologic Examination  Chief Complaint   Patient presents with    Gynecologic Exam     Annual exam, pap not indicated. Pt is well, no gyn concerns at this time.    Last pap 2023 neg pap/ neg hpv   breast ca  Dr Wesley's office, h/o breast ca  Mammo 2024 Benign, Diagnostic in 1 yr for both breast   MRI breast 2024 L-breast: Negative R-breast: Benign, Breast MRI Screening in 1 yr   Colonoscopy 2022 repeat in 10 years       HPI: Divina Dave is a 49 y.o.  who presents for annual gynecologic examination.  She has the following concerns:  periods are all over the place, skipping several months, gets hot flashes/night sweats. Not a candidate for HRT due to h/o breast cancer.       Health Maintenance:    Exercise: frequently  Breast exams/breast awareness: yes  Last mammogram: 3/2024, MRI yesterday. Sees Dr Wesley's office.   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; [facility]    Breast cancer (HCC) 2019    right breast    Cancer (HCC) 2019    breast    Female infertility     Fibrocystic breast     History of radiation therapy 2019    Right WBRT    Recurrent pregnancy loss, antepartum condition or complication     x 4       Past Surgical History:   Procedure Laterality Date    BREAST BIOPSY Left     BREAST LUMPECTOMY Right 2019    Procedure: BREAST LUMPECTOMY;  BREAST NEEDLE LOCALIZATION (NEEDLE LOC AT 0800);  Surgeon: Michael Wesley MD;  Location[facility];  Service: Surgical Oncology     SECTION      INDUCED       x3    MAMMO NEEDLE LOCALIZATION RIGHT (ALL INC) Right 2019    MAMMO STEREOTACTIC BREAST BIOPSY RIGHT (ALL INC) Right 2019    MRI BREAST BIOPSY LEFT (ALL INCLUSIVE) Left 10/15/2021       Past OB/Gyn History:  Period Duration (Days): 3  Period Pattern: (!) Irregular  Menstrual Flow: Heavy  Menstrual Control: Maxi pad, Thin pad  Dysmenorrhea: (!) Moderate  Dysmenorrhea Symptoms: CrampingPatient's last menstrual period was 2024 (exact date).    Menopausal status: perimenopausal  Menopausal symptoms: see hpi    Last Pap:  : no abnormalities  History of abnormal Pap smear: no    Patient is currently sexually active.   STD testing: no  Current contraception: vasectomy      Family History  Family History   Problem Relation Age of Onset    No Known Problems Mother     No Known Problems Father     No Known Problems Sister     No Known Problems Sister     Diabetes Brother     Hypertension Brother     No Known Problems Son     Breast cancer Maternal Grandmother 45    Diabetes Maternal Grandmother     Heart disease Maternal Grandfather     Diabetes Maternal Grandfather     Breast cancer Paternal Grandmother 45    Diabetes Paternal Grandfather     Heart disease Paternal Grandfather     Hypertension Paternal Grandfather     Heart attack Maternal Aunt     Breast cancer Cousin     Colon cancer Neg Hx     Ovarian cancer Neg Hx        Family history of uterine or ovarian cancer: no  Family history of breast cancer: yes ( and personal history)   Family history of colon cancer: no    Social History:  Social History     Socioeconomic History    Marital status: /Civil Union     Spouse name: Not on file    Number of children: Not on file    Years of education: Not on file    Highest education level: Not on file   Occupational History    Not  "on file   Tobacco Use    Smoking status: Never    Smokeless tobacco: Never   Vaping Use    Vaping status: Never Used   Substance and Sexual Activity    Alcohol use: Not Currently     Comment: rarely    Drug use: Never    Sexual activity: Yes     Partners: Male     Birth control/protection: None   Other Topics Concern    Not on file   Social History Narrative    Drinks coffee    Second Marriage    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 Determinants of Health     Financial Resource Strain: Not on file   Food Insecurity: Not on file   Transportation Needs: Not on file   Physical Activity: Not on file   Stress: Not on file   Social Connections: Not on file   Intimate Partner Violence: Not on file   Housing Stability: Not on file     Domestic violence screen: negative    Allergies:  Allergies   Allergen Reactions    Shellfish-Derived Products - Food Allergy Swelling     shrimp       Medications:    Current Outpatient Medications:     multivitamin (THERAGRAN) TABS, Take 1 tablet by mouth daily, Disp: , Rfl:     busPIRone (BUSPAR) 5 mg tablet, Take 1 tablet (5 mg total) by mouth 3 (three) times a day as needed (anxiety) (Patient not taking: Reported on 9/25/2024), Disp: 30 tablet, Rfl: 0    Review of Systems:  Review of Systems   Constitutional:  Negative for chills and fever.   Respiratory:  Negative for shortness of breath.    Cardiovascular:  Negative for chest pain.   Gastrointestinal:  Negative for abdominal distention, abdominal pain, blood in stool, constipation, nausea and vomiting.   Genitourinary:  Negative for difficulty urinating, dyspareunia, dysuria, frequency, menstrual problem, pelvic pain, urgency, vaginal bleeding, vaginal discharge and vaginal pain.         Physical Exam:  /80 (BP Location: Left arm, Patient Position: Sitting, Cuff Size: Standard)   Ht 5' 1\" (1.549 m)   Wt 56.7 kg (125 lb)   LMP 09/05/2024 (Exact Date)   BMI 23.62 kg/m²    Physical Exam  Constitutional:       General: She " is awake.      Appearance: Normal appearance. She is well-developed.   Genitourinary:      Vulva, bladder and urethral meatus normal.      Right Labia: No rash, tenderness or lesions.     Left Labia: No tenderness, lesions or rash.     No labial fusion noted.      No vaginal discharge, erythema, tenderness or bleeding.      No vaginal prolapse present.     No vaginal atrophy present.       Right Adnexa: not tender, not full and no mass present.     Left Adnexa: not tender, not full and no mass present.     No cervical motion tenderness, discharge, lesion or polyp.      Uterus is not enlarged, tender or irregular.      No uterine mass detected.     No urethral prolapse present.      Bladder is not tender.       Pelvic exam was performed with patient in the lithotomy position.   Breasts:     Right: No inverted nipple, mass, nipple discharge, skin change or tenderness.      Left: No inverted nipple, mass, nipple discharge, skin change or tenderness.   HENT:      Head: Normocephalic and atraumatic.   Cardiovascular:      Rate and Rhythm: Normal rate and regular rhythm.      Heart sounds: Normal heart sounds.   Pulmonary:      Effort: Pulmonary effort is normal. No tachypnea or respiratory distress.      Breath sounds: Normal breath sounds.   Abdominal:      General: Abdomen is flat. There is no distension.      Palpations: Abdomen is soft.      Tenderness: There is no abdominal tenderness. There is no guarding or rebound.   Musculoskeletal:      Cervical back: Neck supple.   Lymphadenopathy:      Upper Body:      Right upper body: No supraclavicular or axillary adenopathy.      Left upper body: No supraclavicular or axillary adenopathy.   Neurological:      General: No focal deficit present.      Mental Status: She is alert and oriented to person, place, and time.   Psychiatric:         Mood and Affect: Mood normal.         Behavior: Behavior normal.         Thought Content: Thought content normal.         Judgment:  Judgment normal.   Vitals reviewed.

## 2024-11-13 ENCOUNTER — OFFICE VISIT (OUTPATIENT)
Dept: SURGICAL ONCOLOGY | Facility: CLINIC | Age: 49
End: 2024-11-13
Payer: COMMERCIAL

## 2024-11-13 VITALS
HEIGHT: 61 IN | RESPIRATION RATE: 68 BRPM | WEIGHT: 126 LBS | SYSTOLIC BLOOD PRESSURE: 118 MMHG | HEART RATE: 72 BPM | TEMPERATURE: 97.8 F | DIASTOLIC BLOOD PRESSURE: 78 MMHG | BODY MASS INDEX: 23.79 KG/M2 | OXYGEN SATURATION: 97 %

## 2024-11-13 DIAGNOSIS — Z08 ENCOUNTER FOR FOLLOW-UP EXAMINATION AFTER COMPLETED TREATMENT FOR MALIGNANT NEOPLASM: Primary | ICD-10-CM

## 2024-11-13 DIAGNOSIS — Z12.31 VISIT FOR SCREENING MAMMOGRAM: ICD-10-CM

## 2024-11-13 DIAGNOSIS — Z86.000 HISTORY OF DUCTAL CARCINOMA IN SITU (DCIS) OF BREAST: ICD-10-CM

## 2024-11-13 DIAGNOSIS — Z15.01 MONOALLELIC MUTATION OF ATM GENE: ICD-10-CM

## 2024-11-13 DIAGNOSIS — Z15.89 MONOALLELIC MUTATION OF ATM GENE: ICD-10-CM

## 2024-11-13 DIAGNOSIS — Z91.89 AT HIGH RISK FOR PANCREATIC CANCER: ICD-10-CM

## 2024-11-13 DIAGNOSIS — Z15.09 MONOALLELIC MUTATION OF ATM GENE: ICD-10-CM

## 2024-11-13 DIAGNOSIS — Z91.89 INCREASED RISK OF BREAST CANCER: ICD-10-CM

## 2024-11-13 PROCEDURE — 99213 OFFICE O/P EST LOW 20 MIN: CPT

## 2024-11-13 NOTE — PROGRESS NOTES
Surgical Oncology Follow Up       [facility] [place]  [address]  [place] [address]    Divina Dave  1975  [id number]  [facility] [place]  [address]  [place] [address]    Chief Complaint   Patient presents with    office visit     Diagnoses and all orders for this visit:    Encounter for follow-up examination after completed treatment for malignant neoplasm    History of ductal carcinoma in situ (DCIS) of breast    Monoallelic mutation of JS gene  -     MRI abdomen w wo contrast and mrcp; Future  -     MRI breast bilateral w and wo contrast w cad; Future    Increased risk of breast cancer  -     MRI breast bilateral w and wo contrast w cad; Future    Visit for screening mammogram  -     Mammo screening bilateral w 3d and cad; Future    At high risk for pancreatic cancer  -     MRI abdomen w wo contrast and mrcp; Future      Discussion/Summary: Patient is a 49-year-old female presenting today for 1 year follow-up for right breast cancer diagnosed in February 2019. Pathology revealed DCIS ER 90%, NJ 20%. She underwent genetic testing which revealed a mutation of JS. She had a right lumpectomy with Dr. Wesley. She completed whole breast radiation therapy but declined hormone therapy. She had a b/l dx mammogram on 3/13/24 which was BIRADS 2 category 4 density. She had a bilateral breast MRI on 09/22/2023 which was benign. There were no concerns on her clinical breast exam. I discussed recent updated recommendations on NCCN for JS PV carriers in regard to pancreatic cancer screening. It is now recommended that individuals > 50 years of age get screened with MRI abd with MRCP despite negative family hx for pancreatic cancer. She was agreeable to imaging. I will get her imaging scheduled for next year. She was instructed to call with any questions or concerns prior to  this time. All questions were answered today.     History of Present Illness:     Oncology History   History of ductal carcinoma in situ (DCIS) of breast   2/25/2019 Biopsy    Right breast stereotatic biopsy  11 o'clock, anterior 3rd of the breast  Ductal carcinoma in situ  Grade 2  ER 90  MN 20    Concordant     3/1/2019 -  Cancer Staged    Staging form: Breast, AJCC 8th Edition  - Pathologic stage from 3/1/2019: Stage Unknown (pTis (DCIS), pNX, cM0, G3, ER+, MN+, HER2: Not Assessed) - Signed by SCOTTIE Delgadillo on 11/13/2023  Stage prefix: Initial diagnosis  Neoadjuvant therapy: No  Nuclear grade: G2  Multigene prognostic tests performed: None  Histologic grading system: 3 grade system  Laterality: Right  Tumor size (mm): 17       3/14/2019 Genetic Testing    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  [facility]     4/2/2019 Surgery    Right lumpectomy  Ductal carcinoma in situ  1.7 cm  Grade 3  Margins negative  Stage 0     4/16/2019 - 4/16/2019 Hormone Therapy    Declined hormone therapy  Consult with Natalie     5/13/2019 - 6/11/2019 Radiation    Dr. Holguin  Course: C1  Right breast, 16 fractions, total dose: 4256 cGy  Right breast cone down, 5 fractions, total dose: 1000 cGy          -Interval History: Patient is a 49-year-old female presenting today for 1 year follow-up for right breast cancer diagnosed in February 2019. She has been on observation. She had a b/l dx mammogram on 3/13/24 which was BIRADS 2 category 4 density. She had a bilateral breast MRI on 09/22/2023 which was benign. Patient denies changes on her breast exam. She denies persistent headaches, bone pain, back pain, shortness of breath, or abdominal pain.      Review of Systems:  Review of Systems   Constitutional:  Negative for activity change, appetite change, fatigue and unexpected weight change.   Respiratory:  Negative for cough and shortness of breath.    Cardiovascular:  Negative for chest pain.   Gastrointestinal:   Negative for abdominal pain, diarrhea, nausea and vomiting.   Endocrine: Negative for heat intolerance.   Musculoskeletal:  Negative for arthralgias, back pain and myalgias.   Skin:  Negative for rash.   Neurological:  Negative for weakness and headaches.   Hematological:  Negative for adenopathy.       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; [facility]    Breast cancer (HCC) 2019    right breast    Cancer (HCC) 2019    breast    Female infertility     Fibrocystic breast     History of radiation therapy 2019    Right WBRT    Recurrent pregnancy loss, antepartum condition or complication     x 4     Past Surgical History:   Procedure Laterality Date    BREAST BIOPSY Left     BREAST LUMPECTOMY Right 2019    Procedure: BREAST LUMPECTOMY; BREAST NEEDLE LOCALIZATION (NEEDLE LOC AT 0800);  Surgeon: Michael Wesley MD;  Location[facility];  Service: Surgical Oncology     SECTION      INDUCED       x3    MAMMO NEEDLE LOCALIZATION RIGHT (ALL INC) Right 2019    MAMMO STEREOTACTIC BREAST BIOPSY RIGHT (ALL INC) Right 2019    MRI BREAST BIOPSY LEFT (ALL INCLUSIVE) Left 10/15/2021     Family History   Problem Relation Age of Onset    No Known Problems Mother     No Known Problems Father     No Known Problems Sister     No Known Problems Sister     Diabetes Brother     Hypertension Brother     No Known Problems Son     Breast cancer Maternal Grandmother 45    Diabetes Maternal Grandmother     Heart disease Maternal Grandfather     Diabetes Maternal Grandfather     Breast cancer Paternal  Grandmother 45    Diabetes Paternal Grandfather     Heart disease Paternal Grandfather     Hypertension Paternal Grandfather     Heart attack Maternal Aunt     Breast cancer Cousin     Colon cancer Neg Hx     Ovarian cancer Neg Hx      Social History     Socioeconomic History    Marital status: /Civil Union     Spouse name: Not on file    Number of children: Not on file    Years of education: Not on file    Highest education level: Not on file   Occupational History    Not on file   Tobacco Use    Smoking status: Never    Smokeless tobacco: Never   Vaping Use    Vaping status: Never Used   Substance and Sexual Activity    Alcohol use: Not Currently     Comment: rarely    Drug use: Never    Sexual activity: Yes     Partners: Male     Birth control/protection: None   Other Topics Concern    Not on file   Social History Narrative    Drinks coffee    Second Marriage    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 Drivers of Health     Financial Resource Strain: Not on file   Food Insecurity: Not on file   Transportation Needs: Not on file   Physical Activity: Not on file   Stress: Not on file   Social Connections: Not on file   Intimate Partner Violence: Not on file   Housing Stability: Not on file       Current Outpatient Medications:     busPIRone (BUSPAR) 5 mg tablet, Take 1 tablet (5 mg total) by mouth 3 (three) times a day as needed (anxiety), Disp: 30 tablet, Rfl: 0    multivitamin (THERAGRAN) TABS, Take 1 tablet by mouth daily, Disp: , Rfl:   Allergies   Allergen Reactions    Shellfish-Derived Products - Food Allergy Swelling     shrimp     Vitals:    11/13/24 0826   BP: 118/78   Pulse: 72   Resp: (!) 68   Temp: 97.8 °F (36.6 °C)   SpO2: 97%       Physical Exam  Constitutional:       General: She is not in acute distress.     Appearance: Normal appearance.   Cardiovascular:      Rate and Rhythm: Normal rate and regular rhythm.      Pulses: Normal pulses.      Heart sounds: Normal heart sounds.   Pulmonary:       Effort: Pulmonary effort is normal.      Breath sounds: Normal breath sounds.   Chest:      Chest wall: No mass.   Breasts:     Right: No swelling, bleeding, inverted nipple, mass, nipple discharge, skin change or tenderness.      Left: No swelling, bleeding, inverted nipple, mass, nipple discharge, skin change or tenderness.       Abdominal:      General: Abdomen is flat.      Palpations: Abdomen is soft.   Lymphadenopathy:      Upper Body:      Right upper body: No supraclavicular, axillary or pectoral adenopathy.      Left upper body: No supraclavicular, axillary or pectoral adenopathy.   Skin:     General: Skin is warm.   Neurological:      General: No focal deficit present.      Mental Status: She is alert and oriented to person, place, and time.   Psychiatric:         Mood and Affect: Mood normal.         Behavior: Behavior normal.           Results:    Imaging  No results found.    I reviewed the above imaging data.      Advance Care Planning/Advance Directives:  Discussed disease status, cancer treatment plans and/or cancer treatment goals with the patient.

## 2024-12-08 ENCOUNTER — HOSPITAL ENCOUNTER (OUTPATIENT)
Dept: MRI IMAGING | Facility: HOSPITAL | Age: 49
Discharge: HOME | End: 2024-12-08
Payer: COMMERCIAL

## 2024-12-08 DIAGNOSIS — Z15.09 MONOALLELIC MUTATION OF ATM GENE: ICD-10-CM

## 2024-12-08 DIAGNOSIS — Z15.01 MONOALLELIC MUTATION OF ATM GENE: ICD-10-CM

## 2024-12-08 DIAGNOSIS — Z91.89 AT HIGH RISK FOR PANCREATIC CANCER: ICD-10-CM

## 2024-12-08 DIAGNOSIS — Z15.89 MONOALLELIC MUTATION OF ATM GENE: ICD-10-CM

## 2024-12-08 PROCEDURE — 74183 MRI ABD W/O CNTR FLWD CNTR: CPT

## 2024-12-08 PROCEDURE — A9585 GADOBUTROL INJECTION: HCPCS

## 2024-12-08 RX ORDER — GADOBUTROL 604.72 MG/ML
5.5 INJECTION INTRAVENOUS
Status: COMPLETED | OUTPATIENT
Start: 2024-12-08 | End: 2024-12-08

## 2024-12-08 RX ADMIN — GADOBUTROL 5.5 ML: 604.72 INJECTION INTRAVENOUS at 08:02

## 2024-12-09 ENCOUNTER — TELEPHONE (OUTPATIENT)
Age: 49
End: 2024-12-09

## 2024-12-09 NOTE — TELEPHONE ENCOUNTER
Patient was sent a MyChart message by the CTS team explaining that her results are still pending at this time.

## 2024-12-09 NOTE — TELEPHONE ENCOUNTER
Patient would like a call back to go over results of recent MRI whenever the results are in. Best # [phone]

## 2024-12-11 ENCOUNTER — RESULTS FOLLOW-UP (OUTPATIENT)
Dept: SURGICAL ONCOLOGY | Facility: CLINIC | Age: 49
End: 2024-12-11

## 2024-12-11 NOTE — TELEPHONE ENCOUNTER
Received a phone call from patient.  Patient inquiring about MRI results.  Informed patient that they are still pending.  Patient expressed frustration that it takes a few days to result.  Patient requesting a time when results will be ready.

## 2025-01-15 ENCOUNTER — OFFICE VISIT (OUTPATIENT)
Dept: INTERNAL MEDICINE CLINIC | Facility: CLINIC | Age: 50
End: 2025-01-15
Payer: COMMERCIAL

## 2025-01-15 VITALS
BODY MASS INDEX: 23.94 KG/M2 | TEMPERATURE: 96.9 F | HEART RATE: 77 BPM | HEIGHT: 61 IN | DIASTOLIC BLOOD PRESSURE: 70 MMHG | SYSTOLIC BLOOD PRESSURE: 100 MMHG | OXYGEN SATURATION: 98 % | WEIGHT: 126.8 LBS

## 2025-01-15 DIAGNOSIS — E78.49 OTHER HYPERLIPIDEMIA: ICD-10-CM

## 2025-01-15 DIAGNOSIS — N95.1 MENOPAUSAL SYMPTOMS: Primary | ICD-10-CM

## 2025-01-15 DIAGNOSIS — F43.22 ADJUSTMENT DISORDER WITH ANXIOUS MOOD: ICD-10-CM

## 2025-01-15 DIAGNOSIS — Z86.000 HISTORY OF DUCTAL CARCINOMA IN SITU (DCIS) OF BREAST: ICD-10-CM

## 2025-01-15 DIAGNOSIS — Z00.00 ANNUAL PHYSICAL EXAM: ICD-10-CM

## 2025-01-15 DIAGNOSIS — E55.9 VITAMIN D DEFICIENCY: ICD-10-CM

## 2025-01-15 DIAGNOSIS — Z79.899 ENCOUNTER FOR LONG-TERM CURRENT USE OF MEDICATION: ICD-10-CM

## 2025-01-15 DIAGNOSIS — Z00.00 LABORATORY EXAMINATION ORDERED AS PART OF A ROUTINE GENERAL MEDICAL EXAMINATION: ICD-10-CM

## 2025-01-15 PROCEDURE — 99214 OFFICE O/P EST MOD 30 MIN: CPT | Performed by: INTERNAL MEDICINE

## 2025-01-15 PROCEDURE — 99396 PREV VISIT EST AGE 40-64: CPT | Performed by: INTERNAL MEDICINE

## 2025-01-15 RX ORDER — VENLAFAXINE HYDROCHLORIDE 37.5 MG/1
37.5 CAPSULE, EXTENDED RELEASE ORAL
Qty: 30 CAPSULE | Refills: 1 | Status: SHIPPED | OUTPATIENT
Start: 2025-01-15

## 2025-01-15 RX ORDER — PAROXETINE 10 MG/1
10 TABLET, FILM COATED ORAL DAILY
Qty: 90 TABLET | Refills: 0 | Status: SHIPPED | OUTPATIENT
Start: 2025-01-15 | End: 2025-01-15 | Stop reason: ALTCHOICE

## 2025-01-15 NOTE — ASSESSMENT & PLAN NOTE
Trial of low dose venlafaxine.  Follow up with gynecology.  Orders:  •  venlafaxine (EFFEXOR-XR) 37.5 mg 24 hr capsule; Take 1 capsule (37.5 mg total) by mouth daily with breakfast

## 2025-01-15 NOTE — PROGRESS NOTES
Name: Divina Dave      : 1975      MRN: [id number]  Encounter Provider: Clare Silverio MD  Encounter Date: 1/15/2025   Encounter department: [facility] INTERNAL MEDICINE  :  Assessment & Plan  Menopausal symptoms  Trial of low dose venlafaxine.  Follow up with gynecology.  Orders:  •  venlafaxine (EFFEXOR-XR) 37.5 mg 24 hr capsule; Take 1 capsule (37.5 mg total) by mouth daily with breakfast    History of ductal carcinoma in situ (DCIS) of breast  Mammogram, MRI scheduled.  Sees surg onc.  Orders:  •  CBC and differential; Future    Other hyperlipidemia  Repeat lipids, not on medication.  Orders:  •  Comprehensive metabolic panel; Future  •  Lipid panel; Future  •  TSH, 3rd generation with Free T4 reflex; Future    Vitamin D deficiency  Restart daily D3.  Orders:  •  Vitamin D 25 hydroxy; Future    Adjustment disorder with anxious mood  No recent issues, has a new job.  Has not needed any medication, not seeing therapist anymore.       Laboratory examination ordered as part of a routine general medical examination    Orders:  •  CBC and differential; Future  •  Comprehensive metabolic panel; Future  •  Lipid panel; Future  •  Vitamin D 25 hydroxy; Future  •  TSH, 3rd generation with Free T4 reflex; Future  •  Vitamin B12; Future    Encounter for long-term current use of medication    Orders:  •  Vitamin B12; Future    Annual physical exam  Declined flu vaccine.  Screening updated.       Follow up in 1 year or as needed.         History of Present Illness     Her last menstrual period was in September last year.  She did see her gynecologist at that time, was having no symptoms.  She reports in the last few months, she has been experiencing hot flashes, difficulty sleeping at night.  She reports night sweats, also sweating during the day.  She reports mood has not significantly changed.  She has trouble sleeping, requesting for medication.    She is working, has a new job.  She  "reports no issues with stress.  She has had no anxiety or panic attacks.    She denies any chest pain, shortness of breath, palpitations, constipation or other symptoms.  She has a dog, has been walking almost daily.      Review of Systems   Constitutional:  Positive for activity change. Negative for appetite change and fatigue.   HENT:  Negative for congestion, ear pain and postnasal drip.    Eyes:  Negative for visual disturbance.   Respiratory:  Negative for cough and shortness of breath.    Cardiovascular:  Negative for chest pain and leg swelling.   Gastrointestinal:  Negative for abdominal pain, constipation and diarrhea.   Genitourinary:  Negative for dysuria, frequency and urgency.   Musculoskeletal:  Negative for arthralgias and myalgias.   Skin:  Negative for rash and wound.   Neurological:  Negative for dizziness, numbness and headaches.   Psychiatric/Behavioral:  Positive for sleep disturbance. Negative for confusion and dysphoric mood. The patient is not nervous/anxious.        Objective   /70   Pulse 77   Temp (!) 96.9 °F (36.1 °C)   Ht 5' 1\" (1.549 m)   Wt 57.5 kg (126 lb 12.8 oz)   SpO2 98%   BMI 23.96 kg/m²      Physical Exam  Vitals and nursing note reviewed.   Constitutional:       General: She is not in acute distress.     Appearance: She is well-developed.   HENT:      Head: Normocephalic and atraumatic.      Right Ear: External ear normal.      Left Ear: External ear normal.      Mouth/Throat:      Mouth: Mucous membranes are moist.   Eyes:      Pupils: Pupils are equal, round, and reactive to light.   Cardiovascular:      Rate and Rhythm: Normal rate and regular rhythm.      Heart sounds: Normal heart sounds.   Pulmonary:      Effort: Pulmonary effort is normal.      Breath sounds: Normal breath sounds. No wheezing.   Abdominal:      General: Bowel sounds are normal.      Palpations: Abdomen is soft.   Musculoskeletal:         General: No swelling.      Right lower leg: No edema. "      Left lower leg: No edema.   Skin:     General: Skin is warm.      Findings: No rash.   Neurological:      General: No focal deficit present.      Mental Status: She is alert and oriented to person, place, and time.   Psychiatric:         Mood and Affect: Mood and affect normal. Mood is not anxious or depressed.         Behavior: Behavior normal.           Labs & imaging results reviewed with patient.

## 2025-01-15 NOTE — ASSESSMENT & PLAN NOTE
Repeat lipids, not on medication.  Orders:  •  Comprehensive metabolic panel; Future  •  Lipid panel; Future  •  TSH, 3rd generation with Free T4 reflex; Future

## 2025-02-12 LAB
25(OH)D3+25(OH)D2 SERPL-MCNC: 49.4 NG/ML (ref 30–100)
ALBUMIN SERPL-MCNC: 3.9 G/DL (ref 3.9–4.9)
ALP SERPL-CCNC: 80 IU/L (ref 44–121)
ALT SERPL-CCNC: 43 IU/L (ref 0–32)
AST SERPL-CCNC: 33 IU/L (ref 0–40)
BASOPHILS # BLD AUTO: 0 X10E3/UL (ref 0–0.2)
BASOPHILS NFR BLD AUTO: 1 %
BILIRUB SERPL-MCNC: 0.3 MG/DL (ref 0–1.2)
BUN SERPL-MCNC: 9 MG/DL (ref 6–24)
BUN/CREAT SERPL: 13 (ref 9–23)
CALCIUM SERPL-MCNC: 9.5 MG/DL (ref 8.7–10.2)
CHLORIDE SERPL-SCNC: 105 MMOL/L (ref 96–106)
CHOLEST SERPL-MCNC: 190 MG/DL (ref 100–199)
CHOLEST/HDLC SERPL: 2.4 RATIO (ref 0–4.4)
CO2 SERPL-SCNC: 26 MMOL/L (ref 20–29)
CREAT SERPL-MCNC: 0.7 MG/DL (ref 0.57–1)
EGFR: 105 ML/MIN/1.73
EOSINOPHIL # BLD AUTO: 0.2 X10E3/UL (ref 0–0.4)
EOSINOPHIL NFR BLD AUTO: 3 %
ERYTHROCYTE [DISTWIDTH] IN BLOOD BY AUTOMATED COUNT: 11.8 % (ref 11.7–15.4)
GLOBULIN SER-MCNC: 2.8 G/DL (ref 1.5–4.5)
GLUCOSE SERPL-MCNC: 88 MG/DL (ref 70–99)
HCT VFR BLD AUTO: 39.9 % (ref 34–46.6)
HDLC SERPL-MCNC: 78 MG/DL
HGB BLD-MCNC: 12.6 G/DL (ref 11.1–15.9)
IMM GRANULOCYTES # BLD: 0 X10E3/UL (ref 0–0.1)
IMM GRANULOCYTES NFR BLD: 0 %
LDLC SERPL CALC-MCNC: 101 MG/DL (ref 0–99)
LYMPHOCYTES # BLD AUTO: 1.8 X10E3/UL (ref 0.7–3.1)
LYMPHOCYTES NFR BLD AUTO: 35 %
MCH RBC QN AUTO: 27.8 PG (ref 26.6–33)
MCHC RBC AUTO-ENTMCNC: 31.6 G/DL (ref 31.5–35.7)
MCV RBC AUTO: 88 FL (ref 79–97)
MONOCYTES # BLD AUTO: 0.5 X10E3/UL (ref 0.1–0.9)
MONOCYTES NFR BLD AUTO: 10 %
NEUTROPHILS # BLD AUTO: 2.7 X10E3/UL (ref 1.4–7)
NEUTROPHILS NFR BLD AUTO: 51 %
PLATELET # BLD AUTO: 319 X10E3/UL (ref 150–450)
POTASSIUM SERPL-SCNC: 4 MMOL/L (ref 3.5–5.2)
PROT SERPL-MCNC: 6.7 G/DL (ref 6–8.5)
RBC # BLD AUTO: 4.53 X10E6/UL (ref 3.77–5.28)
SL AMB VLDL CHOLESTEROL CALC: 11 MG/DL (ref 5–40)
SODIUM SERPL-SCNC: 143 MMOL/L (ref 134–144)
TRIGL SERPL-MCNC: 61 MG/DL (ref 0–149)
TSH SERPL DL<=0.005 MIU/L-ACNC: 2.63 UIU/ML (ref 0.45–4.5)
VIT B12 SERPL-MCNC: 695 PG/ML (ref 232–1245)
WBC # BLD AUTO: 5.3 X10E3/UL (ref 3.4–10.8)

## 2025-02-13 ENCOUNTER — TELEPHONE (OUTPATIENT)
Age: 50
End: 2025-02-13

## 2025-02-13 NOTE — TELEPHONE ENCOUNTER
Patient requesting to speak further with provider regarding the following results:    Provider:    Lab   [x] 2/11  MRI  []  US  []  CT   []  X-Ray  []    Other   []      Patient called regarding abnormal lab results she received on her Mychart.

## 2025-02-14 NOTE — TELEPHONE ENCOUNTER
Please call patient.  One of your liver enzymes were slightly elevated. I will monitor this. This may be caused by fatty liver, increased alcohol or Tylenol use.  Your LDL, cholesterol is still slightly elevated, improved from previous.  The rest of your labs were normal.

## 2025-02-17 ENCOUNTER — TELEPHONE (OUTPATIENT)
Dept: INTERNAL MEDICINE CLINIC | Facility: CLINIC | Age: 50
End: 2025-02-17

## 2025-02-17 NOTE — TELEPHONE ENCOUNTER
Pt called with complaints of right arm numbness, tingling and heaviness off and on for the last three weeks.  Pt states it got worse last night.  No chest pain or shortness of breath.  No headache.  No injury to her right arm.  Pt is not taking any medication for her symptoms. Please advise.

## 2025-02-17 NOTE — TELEPHONE ENCOUNTER
Schedule appt with me or Frida.    Also, ask how she is doing with the Effexor (venlafaxine) which we started for hot flashes.

## 2025-02-19 ENCOUNTER — OFFICE VISIT (OUTPATIENT)
Dept: LAB | Facility: CLINIC | Age: 50
End: 2025-02-19
Payer: COMMERCIAL

## 2025-02-19 ENCOUNTER — OFFICE VISIT (OUTPATIENT)
Dept: INTERNAL MEDICINE CLINIC | Facility: CLINIC | Age: 50
End: 2025-02-19
Payer: COMMERCIAL

## 2025-02-19 VITALS
OXYGEN SATURATION: 100 % | WEIGHT: 127 LBS | BODY MASS INDEX: 23.98 KG/M2 | SYSTOLIC BLOOD PRESSURE: 118 MMHG | DIASTOLIC BLOOD PRESSURE: 76 MMHG | TEMPERATURE: 99.1 F | HEART RATE: 86 BPM | HEIGHT: 61 IN

## 2025-02-19 DIAGNOSIS — R07.89 OTHER CHEST PAIN: ICD-10-CM

## 2025-02-19 DIAGNOSIS — H92.01 DISCOMFORT OF RIGHT EAR: ICD-10-CM

## 2025-02-19 DIAGNOSIS — R20.2 NUMBNESS AND TINGLING OF RIGHT ARM: Primary | ICD-10-CM

## 2025-02-19 DIAGNOSIS — R20.0 NUMBNESS AND TINGLING OF RIGHT ARM: Primary | ICD-10-CM

## 2025-02-19 LAB
ATRIAL RATE: 74 BPM
P AXIS: 73 DEGREES
PR INTERVAL: 200 MS
QRS AXIS: 22 DEGREES
QRSD INTERVAL: 76 MS
QT INTERVAL: 352 MS
QTC INTERVAL: 391 MS
T WAVE AXIS: 53 DEGREES
VENTRICULAR RATE: 74 BPM

## 2025-02-19 PROCEDURE — 99213 OFFICE O/P EST LOW 20 MIN: CPT | Performed by: INTERNAL MEDICINE

## 2025-02-19 PROCEDURE — 93005 ELECTROCARDIOGRAM TRACING: CPT

## 2025-02-19 PROCEDURE — 93010 ELECTROCARDIOGRAM REPORT: CPT | Performed by: INTERNAL MEDICINE

## 2025-02-19 NOTE — PATIENT INSTRUCTIONS
Patient Education     Neck Pain Exercises   About this topic   The neck or cervical spine has 7 spinal bones that run from the base of your skull to the upper back. These spinal bones have discs in between them. Discs act as shock absorbers. Ligaments are strong bands of tissue that hold the bones together. Many muscles surround and attach on these bones. Nerves come off of the spinal cord and exit out of small spaces in between the spinal bones. You can have neck pain if any of these are injured or damaged. Exercises may help to make this problem better.  General   Before starting with a program, ask your doctor if you are healthy enough to do these exercises. Your doctor may have you work with a  or physical therapist to make a safe exercise program to meet your needs. You should not do the exercises if they cause sharp pains, if you feel dizzy, or if you have vision changes.  Stretching Exercises   Stretching exercises keep your muscles flexible. They also stop them from getting tight. Start by doing each of these stretches 2 to 3 times. In order for your body to make changes, you will need to hold these stretches for 20 to 30 seconds. Try to do the stretches 2 to 3 times each day. Do all exercises slowly.  Passive neck stretches:  Put your left hand on top of your head. Your other arm can be at your side or behind your back. Pull your head toward your left shoulder until you feel a gentle stretch on the right side of your neck. Repeat on the other side using your other hand.  Also, try this stretch by pulling in a diagonal direction. With your left hand on top of your head, pull your head down towards the direction of your left knee. You should feel this stretch toward the back on the right side of your neck. Repeat on the other side.  Active neck stretches:  Neck front-to-back motion ? Look down to the floor until you feel a stretch in the back of your neck. Hold. Next, look up to the ceiling until you  feel a stretch in the front of your neck. Hold.  Neck side-to-side motion ? Tilt your head to the side and bring your ear to your shoulder until you feel a stretch on the other side of your neck. Hold. Next, tilt your head to the other side until you feel a stretch. Hold.  Neck turning ? Turn only your head and look over your left shoulder until you feel stretching in the right side of your neck. Hold. Now turn only your head and look over your right shoulder until you feel a stretch in the left side of your neck. Hold.  Scalene stretches ? Grasp your head with the hand opposite the side you want to stretch. Pull your head to the side until you feel a stretch. Now, slowly turn your head so your chin is pointed upwards.  Chin tucks ? Stand straight or lie down on your back. Tuck your chin in and lengthen the back of your neck. Return to the starting position and repeat. It may help to stand up against a wall during this exercise. Try gently pushing your chin with two fingers while trying to flatten your neck against the wall. If you do this exercise lying down, try using a small rolled up washcloth under your neck. Push down into the washcloth when tucking in your chin.  Strengthening Exercises   Strengthening exercises keep your muscles firm and strong. Start by repeating each exercise 2 to 3 times. Work up to doing each exercise 10 times. Try to do the exercises 2 to 3 times each day. Hold each exercise for 3 to 5 seconds. Do all exercises slowly.  Shoulder blade squeezes ? Pinch your shoulder blades together on your upper back and hold 3 to 5 seconds. Relax.             What will the results be?   Less pain and stiffness  Better range of motion  Increased strength  Help you heal faster after an injury or surgery  Increase blood flow to a body part  Help you feel better and more relaxed  Give you more energy  More toned looking muscles  Better posture  Easier to do daily activities  Helpful tips   Stay active and  work out to keep your muscles strong and flexible.  Be sure you do not hold your breath when exercising. This can raise your blood pressure. If you tend to hold your breath, try counting out loud when exercising. If any exercise bothers you, stop right away.  Try swinging your arms at an easy pace for a few minutes to warm up your muscles. Do this again after exercising.  Doing exercises before a meal may be a good way to get into a routine.  Exercise may be slightly uncomfortable, but you should not have sharp pains. If you do get sharp pains, stop what you are doing. If the sharp pains continue, call your doctor.  Last Reviewed Date   2020-03-10  Consumer Information Use and Disclaimer   This generalized information is a limited summary of diagnosis, treatment, and/or medication information. It is not meant to be comprehensive and should be used as a tool to help the user understand and/or assess potential diagnostic and treatment options. It does NOT include all information about conditions, treatments, medications, side effects, or risks that may apply to a specific patient. It is not intended to be medical advice or a substitute for the medical advice, diagnosis, or treatment of a health care provider based on the health care provider's examination and assessment of a patient’s specific and unique circumstances. Patients must speak with a health care provider for complete information about their health, medical questions, and treatment options, including any risks or benefits regarding use of medications. This information does not endorse any treatments or medications as safe, effective, or approved for treating a specific patient. [employer] and its affiliates disclaim any warranty or liability relating to this information or the use thereof. The use of this information is governed by the Terms of Use, available at https://www.woltersArrogeneuwer.com/en/know/clinical-effectiveness-terms   Copyright   Copyright ©  2024 [employer] and its affiliates and/or licensors. All rights reserved.

## 2025-02-19 NOTE — PROGRESS NOTES
Name: Divina Dave      : 1975      MRN: [id number]  Encounter Provider: Clare Silverio MD  Encounter Date: 2025   Encounter department: [facility] INTERNAL MEDICINE  :  Assessment & Plan  Numbness and tingling of right arm  Differential Dx: cervical radiculopathy, carpal tunnel.  Start stretching exercises, this was demonstrated to her.  Orders:  •  XR spine cervical complete 4 or 5 vw non injury; Future    Other chest pain  Check EKG  Orders:  •  ECG 12 lead; Future    Discomfort of right ear  Suspect starts of a viral illness.  Instructed to use saline nasal spray as needed.  Monitor for fevers.  Start saline gargles for the sore throat.              History of Present Illness   Symptoms started about 3 weeks ago.  She feels some numbness and tingling from her right shoulder down to the fingers of her right hand.  She reports no neck pain or discomfort, denies any extremity weakness.  Denies any headache or dizziness. She reports symptoms intermittent, not related to certain activities or movements.  She thought she just slept in the wrong position but symptoms would not go away.  She reports no weakness or neck pain.  She has not taken or applied any medication for it.  She denies any chest pressure, no shortness of breath. She is worried about this.    Yesterday, she started to experience right ear discomfort.  She woke up today with a mild sore throat.  Denies any fever or chills.  No known sick contacts.  She has not taken any over-the-counter medication.      Review of Systems   Constitutional:  Negative for activity change and appetite change.   Cardiovascular:  Negative for chest pain.   Musculoskeletal:  Negative for neck pain and neck stiffness.   Neurological:  Positive for numbness. Negative for weakness, light-headedness and headaches.   Psychiatric/Behavioral:  The patient is nervous/anxious.        Objective   /76   Pulse 86   Temp 99.1 °F (37.3 °C)    "Ht 5' 1\" (1.549 m)   Wt 57.6 kg (127 lb)   SpO2 100%   BMI 24.00 kg/m²      Physical Exam  Constitutional:       Appearance: Normal appearance.   HENT:      Head: Normocephalic and atraumatic.      Right Ear: Tympanic membrane, ear canal and external ear normal.      Left Ear: Tympanic membrane, ear canal and external ear normal.      Nose: Congestion present. No rhinorrhea.      Mouth/Throat:      Mouth: Mucous membranes are moist.   Eyes:      Pupils: Pupils are equal, round, and reactive to light.   Cardiovascular:      Rate and Rhythm: Normal rate and regular rhythm.   Pulmonary:      Effort: Pulmonary effort is normal. No respiratory distress.      Breath sounds: Normal breath sounds.   Neurological:      General: No focal deficit present.      Mental Status: She is alert and oriented to person, place, and time.      Sensory: Sensory deficit present.      Motor: Motor function is intact. No weakness.      Comments: 10% sensory deficit C5-6 right         "

## 2025-02-24 ENCOUNTER — TELEPHONE (OUTPATIENT)
Age: 50
End: 2025-02-24

## 2025-02-24 NOTE — TELEPHONE ENCOUNTER
Please call patient.  Ask if having any fevers, cough, nasal congestion or ear pain.  Ask if exposed to anyone sick, if she checked for COVID.

## 2025-02-24 NOTE — TELEPHONE ENCOUNTER
Patient seen on 2/19 by Dr. Silverio for other concerns. Was beginning to get tickle in throat and problems with ears on 2/20. Took over the counter medication and it is not helping. No appointments open today. Added to wait list. Would like to be seen today if anything opens.

## 2025-04-28 ENCOUNTER — OFFICE VISIT (OUTPATIENT)
Dept: INTERNAL MEDICINE CLINIC | Facility: CLINIC | Age: 50
End: 2025-04-28
Payer: COMMERCIAL

## 2025-04-28 VITALS
BODY MASS INDEX: 24.35 KG/M2 | OXYGEN SATURATION: 100 % | TEMPERATURE: 97.8 F | WEIGHT: 129 LBS | HEART RATE: 80 BPM | SYSTOLIC BLOOD PRESSURE: 104 MMHG | RESPIRATION RATE: 14 BRPM | HEIGHT: 61 IN | DIASTOLIC BLOOD PRESSURE: 68 MMHG

## 2025-04-28 DIAGNOSIS — H92.09 EARACHE: ICD-10-CM

## 2025-04-28 DIAGNOSIS — J02.9 SORE THROAT: Primary | ICD-10-CM

## 2025-04-28 LAB
S PYO AG THROAT QL: NEGATIVE
SARS-COV-2 AG UPPER RESP QL IA: NEGATIVE
VALID CONTROL: NORMAL

## 2025-04-28 PROCEDURE — 87811 SARS-COV-2 COVID19 W/OPTIC: CPT | Performed by: INTERNAL MEDICINE

## 2025-04-28 PROCEDURE — 99213 OFFICE O/P EST LOW 20 MIN: CPT | Performed by: INTERNAL MEDICINE

## 2025-04-28 PROCEDURE — 87880 STREP A ASSAY W/OPTIC: CPT | Performed by: INTERNAL MEDICINE

## 2025-04-28 RX ORDER — LIDOCAINE HYDROCHLORIDE 20 MG/ML
15 SOLUTION OROPHARYNGEAL 4 TIMES DAILY PRN
Qty: 100 ML | Refills: 0 | Status: SHIPPED | OUTPATIENT
Start: 2025-04-28

## 2025-04-28 RX ORDER — FLUTICASONE PROPIONATE 50 MCG
1 SPRAY, SUSPENSION (ML) NASAL DAILY
Qty: 16 G | Refills: 1 | Status: SHIPPED | OUTPATIENT
Start: 2025-04-28

## 2025-04-28 NOTE — PROGRESS NOTES
"Name: Divina Dave      : 1975      MRN: [id number]  Encounter Provider: Clare Silverio MD  Encounter Date: 2025   Encounter department: [facility] INTERNAL MEDICINE  :  Assessment & Plan  Sore throat  Rapid strep negative. Recommend symptomatic treatment, continue with salt water gargles, Tylenol/ ibuprofen prn.  Recommend against antibiotics since symptoms only started about 12 hours ago.    Orders:  •  POCT rapid ANTIGEN strepA  •  POCT Rapid Covid Ag  •  Lidocaine Viscous HCl (XYLOCAINE) 2 % mucosal solution; Swish and spit 15 mL 4 (four) times a day as needed for mouth pain or discomfort    Earache  Start Flonase daily.    Orders:  •  POCT Rapid Covid Ag  •  fluticasone (FLONASE) 50 mcg/act nasal spray; 1 spray into each nostril daily           History of Present Illness   She reports symptoms started last night, woke up around 1 AM and experienced sore throat.  She felt both her ears were stuffy.  She woke up this morning, symptoms slightly worse.  She feels hoarse, has pain when she swallows.  Denies any fever or chills.  She has an occasional nonproductive cough and a mild headache.  No known sick contacts.  No GI symptoms.  She started using throat lozenges, took Robitussin earlier today.      Review of Systems   Constitutional:  Negative for chills, fatigue and fever.   HENT:  Positive for ear pain, sore throat and trouble swallowing. Negative for congestion, ear discharge, postnasal drip and rhinorrhea.    Respiratory:  Positive for cough. Negative for shortness of breath.    Neurological:  Positive for headaches.       Objective   /68 (BP Location: Left arm, Patient Position: Sitting, Cuff Size: Large)   Pulse 80   Temp 97.8 °F (36.6 °C)   Resp 14   Ht 5' 1\" (1.549 m)   Wt 58.5 kg (129 lb)   SpO2 100%   BMI 24.37 kg/m²      Physical Exam  HENT:      Head: Normocephalic and atraumatic.      Right Ear: Tympanic membrane and ear canal normal.      Left " Ear: Tympanic membrane and ear canal normal.      Nose: Congestion present. No rhinorrhea.      Mouth/Throat:      Mouth: Mucous membranes are moist.      Pharynx: Posterior oropharyngeal erythema present. No oropharyngeal exudate.   Cardiovascular:      Rate and Rhythm: Normal rate and regular rhythm.   Pulmonary:      Effort: Pulmonary effort is normal. No respiratory distress.      Breath sounds: Normal breath sounds.   Musculoskeletal:      Cervical back: Neck supple.   Neurological:      General: No focal deficit present.      Mental Status: She is alert and oriented to person, place, and time.   Psychiatric:         Mood and Affect: Mood normal.         Behavior: Behavior normal.

## 2025-07-02 ENCOUNTER — HOSPITAL ENCOUNTER (OUTPATIENT)
Dept: MAMMOGRAPHY | Facility: HOSPITAL | Age: 50
Discharge: HOME | End: 2025-07-02
Payer: COMMERCIAL

## 2025-07-02 VITALS — HEIGHT: 61 IN | BODY MASS INDEX: 24.35 KG/M2 | WEIGHT: 129 LBS

## 2025-07-02 DIAGNOSIS — Z12.31 VISIT FOR SCREENING MAMMOGRAM: ICD-10-CM

## 2025-07-02 PROCEDURE — 77063 BREAST TOMOSYNTHESIS BI: CPT

## 2025-07-02 PROCEDURE — 77067 SCR MAMMO BI INCL CAD: CPT

## (undated) DEVICE — MARGIN MARKER SET

## (undated) DEVICE — ADHESIVE SKN CLSR HISTOACRYL FLEX 0.5ML LF

## (undated) DEVICE — 3M™ STERI-STRIP™ REINFORCED ADHESIVE SKIN CLOSURES, R1547, 1/2 IN X 4 IN (12 MM X 100 MM), 6 STRIPS/ENVELOPE: Brand: 3M™ STERI-STRIP™

## (undated) DEVICE — DRAPE EQUIPMENT RF WAND

## (undated) DEVICE — GLOVE SRG BIOGEL 7

## (undated) DEVICE — MEDI-VAC YANK SUCT HNDL W/TPRD BULBOUS TIP: Brand: CARDINAL HEALTH

## (undated) DEVICE — NEEDLE 25G X 1 1/2

## (undated) DEVICE — PENCIL ELECTROSURG E-Z CLEAN -0035H

## (undated) DEVICE — SUT VICRYL 3-0 SH 27 IN J416H

## (undated) DEVICE — LIGHT HANDLE COVER SLEEVE DISP BLUE STELLAR

## (undated) DEVICE — SUT MONOCRYL 4-0 PS-2 18 IN Y496G

## (undated) DEVICE — PAD GROUNDING ADULT

## (undated) DEVICE — CHLORAPREP HI-LITE 26ML ORANGE

## (undated) DEVICE — TUBING SUCTION 5MM X 12 FT

## (undated) DEVICE — VIAL DECANTER

## (undated) DEVICE — SMOKE EVACUATION TUBING WITH 8 IN INTEGRAL WAND AND SPONGE GUARD: Brand: BUFFALO FILTER

## (undated) DEVICE — INTENDED FOR TISSUE SEPARATION, AND OTHER PROCEDURES THAT REQUIRE A SHARP SURGICAL BLADE TO PUNCTURE OR CUT.: Brand: BARD-PARKER SAFETY BLADES SIZE 15, STERILE

## (undated) DEVICE — DRAPE PROBE NEO-PROBE/ULTRASOUND

## (undated) DEVICE — BETHLEHEM UNIVERSAL MINOR GEN: Brand: CARDINAL HEALTH